# Patient Record
Sex: MALE | Race: BLACK OR AFRICAN AMERICAN | NOT HISPANIC OR LATINO | Employment: OTHER | URBAN - METROPOLITAN AREA
[De-identification: names, ages, dates, MRNs, and addresses within clinical notes are randomized per-mention and may not be internally consistent; named-entity substitution may affect disease eponyms.]

---

## 2017-02-13 ENCOUNTER — TRANSCRIBE ORDERS (OUTPATIENT)
Dept: LAB | Facility: CLINIC | Age: 67
End: 2017-02-13

## 2017-02-13 ENCOUNTER — APPOINTMENT (OUTPATIENT)
Dept: LAB | Facility: CLINIC | Age: 67
End: 2017-02-13
Payer: COMMERCIAL

## 2017-02-13 ENCOUNTER — ALLSCRIPTS OFFICE VISIT (OUTPATIENT)
Dept: OTHER | Facility: OTHER | Age: 67
End: 2017-02-13

## 2017-02-13 DIAGNOSIS — R73.09 OTHER ABNORMAL GLUCOSE: ICD-10-CM

## 2017-02-13 DIAGNOSIS — R73.03 PREDIABETES: ICD-10-CM

## 2017-02-13 DIAGNOSIS — E78.00 PURE HYPERCHOLESTEROLEMIA: ICD-10-CM

## 2017-02-13 LAB
ALBUMIN SERPL BCP-MCNC: 3.9 G/DL (ref 3.5–5)
ALP SERPL-CCNC: 56 U/L (ref 46–116)
ALT SERPL W P-5'-P-CCNC: 32 U/L (ref 12–78)
ANION GAP SERPL CALCULATED.3IONS-SCNC: 9 MMOL/L (ref 4–13)
AST SERPL W P-5'-P-CCNC: 18 U/L (ref 5–45)
BILIRUB SERPL-MCNC: 0.48 MG/DL (ref 0.2–1)
BUN SERPL-MCNC: 16 MG/DL (ref 5–25)
CALCIUM SERPL-MCNC: 9.5 MG/DL (ref 8.3–10.1)
CHLORIDE SERPL-SCNC: 110 MMOL/L (ref 100–108)
CHOLEST SERPL-MCNC: 178 MG/DL (ref 50–200)
CO2 SERPL-SCNC: 23 MMOL/L (ref 21–32)
CREAT SERPL-MCNC: 1.11 MG/DL (ref 0.6–1.3)
EST. AVERAGE GLUCOSE BLD GHB EST-MCNC: 123 MG/DL
GFR SERPL CREATININE-BSD FRML MDRD: >60 ML/MIN/1.73SQ M
GLUCOSE SERPL-MCNC: 83 MG/DL (ref 65–140)
HBA1C MFR BLD: 5.9 % (ref 4.2–6.3)
HDLC SERPL-MCNC: 63 MG/DL (ref 40–60)
LDLC SERPL CALC-MCNC: 100 MG/DL (ref 0–100)
POTASSIUM SERPL-SCNC: 4 MMOL/L (ref 3.5–5.3)
PROT SERPL-MCNC: 8 G/DL (ref 6.4–8.2)
SODIUM SERPL-SCNC: 142 MMOL/L (ref 136–145)
TRIGL SERPL-MCNC: 74 MG/DL

## 2017-02-13 PROCEDURE — 36415 COLL VENOUS BLD VENIPUNCTURE: CPT

## 2017-02-13 PROCEDURE — 83036 HEMOGLOBIN GLYCOSYLATED A1C: CPT

## 2017-02-13 PROCEDURE — 80053 COMPREHEN METABOLIC PANEL: CPT

## 2017-02-13 PROCEDURE — 80061 LIPID PANEL: CPT

## 2018-01-13 VITALS
WEIGHT: 226.25 LBS | SYSTOLIC BLOOD PRESSURE: 132 MMHG | OXYGEN SATURATION: 95 % | BODY MASS INDEX: 30.65 KG/M2 | DIASTOLIC BLOOD PRESSURE: 84 MMHG | HEIGHT: 72 IN | HEART RATE: 94 BPM

## 2018-04-26 ENCOUNTER — OFFICE VISIT (OUTPATIENT)
Dept: INTERNAL MEDICINE CLINIC | Facility: CLINIC | Age: 68
End: 2018-04-26
Payer: COMMERCIAL

## 2018-04-26 ENCOUNTER — APPOINTMENT (OUTPATIENT)
Dept: LAB | Facility: CLINIC | Age: 68
End: 2018-04-26
Payer: COMMERCIAL

## 2018-04-26 ENCOUNTER — TELEPHONE (OUTPATIENT)
Dept: INTERNAL MEDICINE CLINIC | Facility: CLINIC | Age: 68
End: 2018-04-26

## 2018-04-26 VITALS
OXYGEN SATURATION: 93 % | SYSTOLIC BLOOD PRESSURE: 124 MMHG | HEIGHT: 72 IN | BODY MASS INDEX: 30.96 KG/M2 | DIASTOLIC BLOOD PRESSURE: 82 MMHG | HEART RATE: 73 BPM | WEIGHT: 228.6 LBS

## 2018-04-26 DIAGNOSIS — E66.9 OBESITY (BMI 30-39.9): Chronic | ICD-10-CM

## 2018-04-26 DIAGNOSIS — Z23 NEED FOR PNEUMOCOCCAL VACCINATION: ICD-10-CM

## 2018-04-26 DIAGNOSIS — E78.2 MIXED HYPERLIPIDEMIA: Chronic | ICD-10-CM

## 2018-04-26 DIAGNOSIS — Z71.3 DIETARY COUNSELING AND SURVEILLANCE: ICD-10-CM

## 2018-04-26 DIAGNOSIS — Z13.6 SCREENING FOR AAA (ABDOMINAL AORTIC ANEURYSM): ICD-10-CM

## 2018-04-26 DIAGNOSIS — Z13.31 DEPRESSION SCREENING NEGATIVE: ICD-10-CM

## 2018-04-26 DIAGNOSIS — R73.03 PREDIABETES: Primary | Chronic | ICD-10-CM

## 2018-04-26 DIAGNOSIS — R73.03 PREDIABETES: Chronic | ICD-10-CM

## 2018-04-26 DIAGNOSIS — Z11.59 NEED FOR HEPATITIS C SCREENING TEST: ICD-10-CM

## 2018-04-26 PROBLEM — K57.30 COLON, DIVERTICULOSIS: Status: ACTIVE | Noted: 2017-02-13

## 2018-04-26 PROBLEM — K57.30 COLON, DIVERTICULOSIS: Status: RESOLVED | Noted: 2017-02-13 | Resolved: 2018-04-26

## 2018-04-26 LAB
ANION GAP SERPL CALCULATED.3IONS-SCNC: 8 MMOL/L (ref 4–13)
BUN SERPL-MCNC: 17 MG/DL (ref 5–25)
CALCIUM SERPL-MCNC: 9 MG/DL (ref 8.3–10.1)
CHLORIDE SERPL-SCNC: 107 MMOL/L (ref 100–108)
CHOLEST SERPL-MCNC: 180 MG/DL (ref 50–200)
CO2 SERPL-SCNC: 24 MMOL/L (ref 21–32)
CREAT SERPL-MCNC: 1.28 MG/DL (ref 0.6–1.3)
EST. AVERAGE GLUCOSE BLD GHB EST-MCNC: 137 MG/DL
GFR SERPL CREATININE-BSD FRML MDRD: 67 ML/MIN/1.73SQ M
GLUCOSE P FAST SERPL-MCNC: 103 MG/DL (ref 65–99)
HBA1C MFR BLD: 6.4 % (ref 4.2–6.3)
HCV AB SER QL: NORMAL
HDLC SERPL-MCNC: 64 MG/DL (ref 40–60)
LDLC SERPL CALC-MCNC: 98 MG/DL (ref 0–100)
NONHDLC SERPL-MCNC: 116 MG/DL
POTASSIUM SERPL-SCNC: 4.1 MMOL/L (ref 3.5–5.3)
SODIUM SERPL-SCNC: 139 MMOL/L (ref 136–145)
TRIGL SERPL-MCNC: 92 MG/DL

## 2018-04-26 PROCEDURE — 1101F PT FALLS ASSESS-DOCD LE1/YR: CPT | Performed by: INTERNAL MEDICINE

## 2018-04-26 PROCEDURE — 36415 COLL VENOUS BLD VENIPUNCTURE: CPT

## 2018-04-26 PROCEDURE — 90471 IMMUNIZATION ADMIN: CPT | Performed by: INTERNAL MEDICINE

## 2018-04-26 PROCEDURE — 86803 HEPATITIS C AB TEST: CPT

## 2018-04-26 PROCEDURE — 90732 PPSV23 VACC 2 YRS+ SUBQ/IM: CPT | Performed by: INTERNAL MEDICINE

## 2018-04-26 PROCEDURE — 3008F BODY MASS INDEX DOCD: CPT | Performed by: INTERNAL MEDICINE

## 2018-04-26 PROCEDURE — 99214 OFFICE O/P EST MOD 30 MIN: CPT | Performed by: INTERNAL MEDICINE

## 2018-04-26 PROCEDURE — 80061 LIPID PANEL: CPT

## 2018-04-26 PROCEDURE — 83036 HEMOGLOBIN GLYCOSYLATED A1C: CPT

## 2018-04-26 PROCEDURE — 80048 BASIC METABOLIC PNL TOTAL CA: CPT

## 2018-04-26 NOTE — PROGRESS NOTES
INTERNAL MEDICINE FOLLOW-UP OFFICE VISIT  [facility] - [facility] [address] [place] [address]    NAME: Jonnathan Allen  AGE: 79 y o  SEX: male  : 1950     DATE: 2018     Assessment and Plan:     1  Prediabetes    Patient's last A1C was 5 9%  Repeat A1C was ordered  BMI remains above goal at 31 44  Discussed increasing physical activity  Discussed dietary modifications he can make  - HEMOGLOBIN A1C W/ EAG ESTIMATION; Future    2  Obesity (BMI 30-39  9)    Patient's Body mass index is 31 44 kg/m²  Discussed the patient's BMI  The BMI is above average; BMI counseling and education was provided to the patient  General weight loss/lifestyle modification strategies discussed (elicit support from others; identify saboteurs; non-food rewards, etc)  Regular aerobic exercise program was recommended at least 3 times per week    - Basic metabolic panel; Future    3  Mixed hyperlipidemia    Patient has a history of mixed hyperlipidemia  Last lipid panel was actually pretty well controlled  Will repeat  No need for statin at this time  - Lipid panel; Future    4  Need for pneumococcal vaccination    PPSV23 given in office today  Patient previously received PCV13 in 2016     - PNEUMOCOCCAL POLYSACCHARIDE VACCINE 23-VALENT =>3YO SQ IM    5  Screening for AAA (abdominal aortic aneurysm)    Patient smoked for 6 years at around 6 cigs/day  Will ordered screening AAA US      - US abdominal aorta screening aaa; Future    6  Need for hepatitis C screening test    Hep C antibody ordered for screening    - Hepatitis C antibody; Future    Return in about 6 months (around 10/26/2018)       Counseling:     · Medication Side Effects - Adverse side effects of medications were reviewed with the patient/guardian today: Yes  · Counseling was given regarding: Diagnostic results, Prognosis, Risks and benefits of tx options, Intructions for management, Patient and family education, Importance of tx compliance, Risk factor reductions and Impressions  · Barriers to treatment include: No identified barriers      Chief Complaint:     Chief Complaint   Patient presents with    Follow-up      History of Present Illness:     Patient presents for routine follow-up  He has a history of pre-diabetes, mixed hyperlipidemia, and obesity  Patient admits to poor physical activity and poor eating habits at times, though he does feel he tries to watch what he eats  Denies any cardiac symptoms  Denies abd pain, nausea, vomiting, blood in stool  Last colonoscopy 10/2014  He smoked for 6 years at around 6 cigs/day  He has had prevnar 13 in 2016  Does not believe he has had PPSV23 since turning 65  The following portions of the patient's history were reviewed and updated as appropriate: allergies, current medications, past family history, past medical history, past social history, past surgical history and problem list      Review of Systems:     Review of Systems   Constitutional: Negative for activity change, appetite change and fatigue  Respiratory: Negative for apnea, cough, chest tightness, shortness of breath and wheezing  Cardiovascular: Negative for chest pain, palpitations and leg swelling  Gastrointestinal: Negative for abdominal distention, abdominal pain, blood in stool, constipation, diarrhea, nausea and vomiting  Musculoskeletal: Positive for arthralgias  Negative for back pain, gait problem, joint swelling and myalgias  Skin: Negative for rash and wound  Neurological: Negative for dizziness, tremors, seizures, syncope, facial asymmetry, speech difficulty, weakness, light-headedness, numbness and headaches  Psychiatric/Behavioral: Negative for behavioral problems, confusion, hallucinations, sleep disturbance and suicidal ideas  The patient is not nervous/anxious  Problem List:     Patient Active Problem List   Diagnosis    Mixed hyperlipidemia    Obesity (BMI 30-39  9)    Prediabetes      Objective: /82 (BP Location: Left arm)   Pulse 73   Ht 5' 11 5" (1 816 m)   Wt 104 kg (228 lb 9 6 oz)   SpO2 93%   BMI 31 44 kg/m²     Physical Exam   Constitutional: He is oriented to person, place, and time  He appears well-developed and well-nourished  No distress  Eyes: Conjunctivae are normal  Right eye exhibits no discharge  Left eye exhibits no discharge  No scleral icterus  Neck: Neck supple  No JVD present  No thyromegaly present  Cardiovascular: Normal rate, regular rhythm, normal heart sounds and intact distal pulses  Exam reveals no gallop and no friction rub  No murmur heard  Pulmonary/Chest: Effort normal and breath sounds normal  No respiratory distress  He has no wheezes  He has no rales  He exhibits no tenderness  Abdominal: Soft  Bowel sounds are normal  He exhibits no distension and no mass  There is no tenderness  There is no rebound and no guarding  Musculoskeletal: Normal range of motion  He exhibits no edema  Right shoulder: He exhibits crepitus  He exhibits normal range of motion and no bony tenderness  Lymphadenopathy:     He has no cervical adenopathy  Neurological: He is alert and oriented to person, place, and time  Skin: Skin is warm and dry  He is not diaphoretic  Psychiatric: He has a normal mood and affect   His behavior is normal      Pertinent Laboratory/Diagnostic Studies:    Laboratory Results: I have personally reviewed the pertinent laboratory results/reports     Results for orders placed or performed in visit on 02/13/17   Hemoglobin A1c   Result Value Ref Range    Hemoglobin A1C 5 9 4 2 - 6 3 %     mg/dl   Lipid Panel with Direct LDL reflex   Result Value Ref Range    Cholesterol 178 50 - 200 mg/dL    Triglycerides 74 <=150 mg/dL    HDL, Direct 63 (H) 40 - 60 mg/dL    LDL Calculated 100 0 - 100 mg/dL   Comprehensive metabolic panel   Result Value Ref Range    Sodium 142 136 - 145 mmol/L    Potassium 4 0 3 5 - 5 3 mmol/L    Chloride 110 (H) 100 - 108 mmol/L    CO2 23 21 - 32 mmol/L    Anion Gap 9 4 - 13 mmol/L    BUN 16 5 - 25 mg/dL    Creatinine 1 11 0 60 - 1 30 mg/dL    Glucose 83 65 - 140 mg/dL    Calcium 9 5 8 3 - 10 1 mg/dL    AST 18 5 - 45 U/L    ALT 32 12 - 78 U/L    Alkaline Phosphatase 56 46 - 116 U/L    Total Protein 8 0 6 4 - 8 2 g/dL    Albumin 3 9 3 5 - 5 0 g/dL    Total Bilirubin 0 48 0 20 - 1 00 mg/dL    eGFR >60 0 ml/min/1 73sq m     PHQ-9  Negative for depression with PHQ2 score of 0       Fall Risk  The patient does not have a history of falls  A risk assessment for falls was completed  Current Medications:     Current Outpatient Prescriptions   Medication Sig Dispense Refill    Acetaminophen (TYLENOL) 325 MG CAPS Take by mouth      aspirin 81 MG tablet Take by mouth      Multiple Vitamin (MULTIVITAMINS PO) Take by mouth       No current facility-administered medications for this visit          German Manzo DO  [facility]

## 2018-04-26 NOTE — TELEPHONE ENCOUNTER
----- Message from Marie Roman DO sent at 4/26/2018 12:33 PM EDT -----  Let patient know his kidney function, cholesterol was normal  No evidence of hepatitis C in his blood  His glucose control has worsened  A1C went from 5 9 to 6 4%  He is on verge of diabetes  Diabetes is when you get to A1C of 6 5%  Recommend he increase his physical activity, decrease intake of carbohydrates/starches/sweets, and lose 10-15 lbs

## 2018-04-26 NOTE — PATIENT INSTRUCTIONS
Obesity   AMBULATORY CARE:   Obesity  is when your body mass index (BMI) is greater than 30  Your healthcare provider will use your height and weight to measure your BMI  The risks of obesity include  many health problems, such as injuries or physical disability  You may need tests to check for the following:  · Diabetes     · High blood pressure or high cholesterol     · Heart disease     · Gallbladder or liver disease     · Cancer of the colon, breast, prostate, liver, or kidney     · Sleep apnea     · Arthritis or gout  Seek care immediately if:   · You have a severe headache, confusion, or difficulty speaking  · You have weakness on one side of your body  · You have chest pain, sweating, or shortness of breath  Contact your healthcare provider if:   · You have symptoms of gallbladder or liver disease, such as pain in your upper abdomen  · You have knee or hip pain and discomfort while walking  · You have symptoms of diabetes, such as intense hunger and thirst, and frequent urination  · You have symptoms of sleep apnea, such as snoring or daytime sleepiness  · You have questions or concerns about your condition or care  Treatment for obesity  focuses on helping you lose weight to improve your health  Even a small decrease in BMI can reduce the risk for many health problems  Your healthcare provider will help you set a weight-loss goal   · Lifestyle changes  are the first step in treating obesity  These include making healthy food choices and getting regular physical activity  Your healthcare provider may suggest a weight-loss program that involves coaching, education, and therapy  · Medicine  may help you lose weight when it is used with a healthy diet and physical activity  · Surgery  can help you lose weight if you are very obese and have other health problems  There are several types of weight-loss surgery  Ask your healthcare provider for more information    Be successful losing weight:   · Set small, realistic goals  An example of a small goal is to walk for 20 minutes 5 days a week  Anther goal is to lose 5% of your body weight  · Tell friends, family members, and coworkers about your goals  and ask for their support  Ask a friend to lose weight with you, or join a weight-loss support group  · Identify foods or triggers that may cause you to overeat , and find ways to avoid them  Remove tempting high-calorie foods from your home and workplace  Place a bowl of fresh fruit on your kitchen counter  If stress causes you to eat, then find other ways to cope with stress  · Keep a diary to track what you eat and drink  Also write down how many minutes of physical activity you do each day  Weigh yourself once a week and record it in your diary  Eating changes: You will need to eat 500 to 1,000 fewer calories each day than you currently eat to lose 1 to 2 pounds a week  The following changes will help you cut calories:  · Eat smaller portions  Use small plates, no larger than 9 inches in diameter  Fill your plate half full of fruits and vegetables  Measure your food using measuring cups until you know what a serving size looks like  · Eat 3 meals and 1 or 2 snacks each day  Plan your meals in advance  [place] and eat at home most of the time  Eat slowly  · Eat fruits and vegetables at every meal   They are low in calories and high in fiber, which makes you feel full  Do not add butter, margarine, or cream sauce to vegetables  Use herbs to season steamed vegetables  · Eat less fat and fewer fried foods  Eat more baked or grilled chicken and fish  These protein sources are lower in calories and fat than red meat  Limit fast food  Dress your salads with olive oil and vinegar instead of bottled dressing  · Limit the amount of sugar you eat  Do not drink sugary beverages  Limit alcohol  Activity changes:  Physical activity is good for your body in many ways   It helps you burn calories and build strong muscles  It decreases stress and depression, and improves your mood  It can also help you sleep better  Talk to your healthcare provider before you begin an exercise program   · Exercise for at least 30 minutes 5 days a week  Start slowly  Set aside time each day for physical activity that you enjoy and that is convenient for you  It is best to do both weight training and an activity that increases your heart rate, such as walking, bicycling, or swimming  · Find ways to be more active  Do yard work and housecleaning  Walk up the stairs instead of using elevators  Spend your leisure time going to events that require walking, such as outdoor festivals or fairs  This extra physical activity can help you lose weight and keep it off  Follow up with your healthcare provider as directed: You may need to meet with a dietitian  Write down your questions so you remember to ask them during your visits  © 2017 [employer] Information is for End User's use only and may not be sold, redistributed or otherwise used for commercial purposes  All illustrations and images included in CareNotes® are the copyrighted property of A [employer]  or [employer]  The above information is an  only  It is not intended as medical advice for individual conditions or treatments  Talk to your doctor, nurse or pharmacist before following any medical regimen to see if it is safe and effective for you

## 2018-04-26 NOTE — TELEPHONE ENCOUNTER
----- Message from Haim Oliveira DO sent at 4/26/2018 12:33 PM EDT -----  Let patient know his kidney function, cholesterol was normal  No evidence of hepatitis C in his blood  His glucose control has worsened  A1C went from 5 9 to 6 4%  He is on verge of diabetes  Diabetes is when you get to A1C of 6 5%  Recommend he increase his physical activity, decrease intake of carbohydrates/starches/sweets, and lose 10-15 lbs

## 2018-04-27 ENCOUNTER — TELEPHONE (OUTPATIENT)
Dept: INTERNAL MEDICINE CLINIC | Facility: CLINIC | Age: 68
End: 2018-04-27

## 2018-04-27 NOTE — TELEPHONE ENCOUNTER
----- Message from Ya Arizmendi DO sent at 4/26/2018 12:33 PM EDT -----  Let patient know his kidney function, cholesterol was normal  No evidence of hepatitis C in his blood  His glucose control has worsened  A1C went from 5 9 to 6 4%  He is on verge of diabetes  Diabetes is when you get to A1C of 6 5%  Recommend he increase his physical activity, decrease intake of carbohydrates/starches/sweets, and lose 10-15 lbs

## 2018-05-16 ENCOUNTER — TELEPHONE (OUTPATIENT)
Dept: INTERNAL MEDICINE CLINIC | Facility: CLINIC | Age: 68
End: 2018-05-16

## 2018-05-16 ENCOUNTER — HOSPITAL ENCOUNTER (OUTPATIENT)
Dept: ULTRASOUND IMAGING | Facility: CLINIC | Age: 68
Discharge: HOME | End: 2018-05-16
Payer: COMMERCIAL

## 2018-05-16 DIAGNOSIS — Z13.6 SCREENING FOR AAA (ABDOMINAL AORTIC ANEURYSM): ICD-10-CM

## 2018-05-16 PROCEDURE — 76706 US ABDL AORTA SCREEN AAA: CPT

## 2018-05-16 NOTE — TELEPHONE ENCOUNTER
----- Message from Lary Opitz, DO sent at 5/16/2018  1:48 PM EDT -----  Let patient know there was no evidence of abdominal aortic aneurysm on ultrasound

## 2018-05-16 NOTE — TELEPHONE ENCOUNTER
----- Message from Tanja Gresham sent at 5/16/2018  2:03 PM EDT -----  tried home phone no answer, constant ringing  will CB later  ----- Message -----  From: Alison Beard DO  Sent: 5/16/2018   1:48 PM  To: [facility]    Let patient know there was no evidence of abdominal aortic aneurysm on ultrasound

## 2018-07-24 ENCOUNTER — TELEPHONE (OUTPATIENT)
Dept: INTERNAL MEDICINE CLINIC | Facility: CLINIC | Age: 68
End: 2018-07-24

## 2018-07-24 NOTE — TELEPHONE ENCOUNTER
----- Message from Oscar Messer DO sent at 7/24/2018 10:17 AM EDT -----  Regarding: FW: Colonoscopy [facility] Internal Med  Contact: [phone]  Can we get patient to sign a medical release to request his previous colonoscopy  He can either come to our office and sign a release or we can mail him the form to sign and he can mail it back to us  Dr Tawni Mohs  ----- Message -----  From: Ellie Echeverria  Sent: 7/24/2018  10:01 AM  To: Oscar Messer DO  Subject: RE: Colonoscopy [facility] Internal Med                The speciality practice is requesting a signed medical release from the patient before sending it over  Can you please obtain that and scan it into media, and let me know when it's done so I can fax it over to [address]  Thank you     ----- Message -----  From: Oscar Messer DO  Sent: 7/3/2018   9:13 AM  To: [place]  Subject: Colonoscopy [facility] Internal Med                    - In Care Everywhere -    Colonoscopy order seen in [facility] from 10/30/2014 under other results  Seen from [address]  Needs  colonoscopy placed and linked to this report if we are able to track it down since there is no dictated report in Care Everywhere

## 2018-07-24 NOTE — LETTER
July 27, 2018     America Serna   [address]      Dear Mr Brook Rouse: Your health is important to us  We have attempted to obtain your previous colonoscopy from a previous doctor that you saw through [address] [place] [address]  Their office requires a medical release form  We have enclosed a release form for you to sign  You can either mail us back the signed form or drop it off at the office so we may obtain your previous colonoscopy report  If you have any questions, please do not hesitate to call our office      Sincerely,         Deion Mancilla, DO

## 2018-07-25 ENCOUNTER — OTHER (OUTPATIENT)
Dept: URBAN - METROPOLITAN AREA CLINIC 19 | Facility: CLINIC | Age: 68
Setting detail: DERMATOLOGY
End: 2018-07-25

## 2018-07-25 DIAGNOSIS — D49.2 NEOPLASM OF UNSPECIFIED BEHAVIOR OF BONE, SOFT TISSUE, AND SKIN: ICD-10-CM

## 2018-07-25 PROBLEM — L57.0 ACTINIC KERATOSIS: Status: RESOLVED | Noted: 2018-07-25

## 2018-07-25 PROBLEM — D18.01 HEMANGIOMA OF SKIN AND SUBCUTANEOUS TISSUE: Status: RESOLVED | Noted: 2018-07-25

## 2018-07-25 PROCEDURE — 99213 OFFICE O/P EST LOW 20 MIN: CPT

## 2018-07-25 PROCEDURE — 17003 DESTRUCT PREMALG LES 2-14: CPT

## 2018-07-25 PROCEDURE — 17000 DESTRUCT PREMALG LESION: CPT

## 2018-07-27 ENCOUNTER — TELEPHONE (OUTPATIENT)
Dept: INTERNAL MEDICINE CLINIC | Facility: CLINIC | Age: 68
End: 2018-07-27

## 2018-10-30 ENCOUNTER — APPOINTMENT (OUTPATIENT)
Dept: LAB | Facility: CLINIC | Age: 68
End: 2018-10-30
Payer: COMMERCIAL

## 2018-10-30 ENCOUNTER — TELEPHONE (OUTPATIENT)
Dept: INTERNAL MEDICINE CLINIC | Facility: CLINIC | Age: 68
End: 2018-10-30

## 2018-10-30 ENCOUNTER — OFFICE VISIT (OUTPATIENT)
Dept: INTERNAL MEDICINE CLINIC | Facility: CLINIC | Age: 68
End: 2018-10-30
Payer: COMMERCIAL

## 2018-10-30 VITALS
WEIGHT: 226 LBS | HEIGHT: 72 IN | DIASTOLIC BLOOD PRESSURE: 76 MMHG | BODY MASS INDEX: 30.61 KG/M2 | HEART RATE: 67 BPM | SYSTOLIC BLOOD PRESSURE: 132 MMHG | OXYGEN SATURATION: 96 %

## 2018-10-30 DIAGNOSIS — R73.03 PREDIABETES: Chronic | ICD-10-CM

## 2018-10-30 DIAGNOSIS — E78.2 MIXED HYPERLIPIDEMIA: Chronic | ICD-10-CM

## 2018-10-30 DIAGNOSIS — R73.03 PREDIABETES: Primary | Chronic | ICD-10-CM

## 2018-10-30 LAB
ANION GAP SERPL CALCULATED.3IONS-SCNC: 6 MMOL/L (ref 4–13)
BUN SERPL-MCNC: 17 MG/DL (ref 5–25)
CALCIUM SERPL-MCNC: 9 MG/DL (ref 8.3–10.1)
CHLORIDE SERPL-SCNC: 107 MMOL/L (ref 100–108)
CHOLEST SERPL-MCNC: 164 MG/DL (ref 50–200)
CO2 SERPL-SCNC: 24 MMOL/L (ref 21–32)
CREAT SERPL-MCNC: 1.2 MG/DL (ref 0.6–1.3)
EST. AVERAGE GLUCOSE BLD GHB EST-MCNC: 126 MG/DL
GFR SERPL CREATININE-BSD FRML MDRD: 71 ML/MIN/1.73SQ M
GLUCOSE P FAST SERPL-MCNC: 100 MG/DL (ref 65–99)
HBA1C MFR BLD: 6 % (ref 4.2–6.3)
HDLC SERPL-MCNC: 60 MG/DL (ref 40–60)
LDLC SERPL CALC-MCNC: 89 MG/DL (ref 0–100)
NONHDLC SERPL-MCNC: 104 MG/DL
POTASSIUM SERPL-SCNC: 4 MMOL/L (ref 3.5–5.3)
SODIUM SERPL-SCNC: 137 MMOL/L (ref 136–145)
TRIGL SERPL-MCNC: 77 MG/DL

## 2018-10-30 PROCEDURE — 80061 LIPID PANEL: CPT

## 2018-10-30 PROCEDURE — 99214 OFFICE O/P EST MOD 30 MIN: CPT | Performed by: INTERNAL MEDICINE

## 2018-10-30 PROCEDURE — 80048 BASIC METABOLIC PNL TOTAL CA: CPT

## 2018-10-30 PROCEDURE — 83036 HEMOGLOBIN GLYCOSYLATED A1C: CPT

## 2018-10-30 PROCEDURE — 36415 COLL VENOUS BLD VENIPUNCTURE: CPT

## 2018-10-30 PROCEDURE — 3008F BODY MASS INDEX DOCD: CPT | Performed by: INTERNAL MEDICINE

## 2018-10-30 PROCEDURE — 1160F RVW MEDS BY RX/DR IN RCRD: CPT | Performed by: INTERNAL MEDICINE

## 2018-10-30 NOTE — PROGRESS NOTES
INTERNAL MEDICINE FOLLOW-UP OFFICE VISIT  [facility] - [facility]    NAME: Denise Cain  AGE: 76 y o  SEX: male  : 1950     DATE: 10/30/2018     Assessment and Plan:     1  Prediabetes    Patient again educated on need to make lifestyle changes in order to decrease his weight and reduce risk of progression to diabetes  Will repeat A1C  Discussed with him about dietary changes he should make  Increase exercise to 150 minutes/week  Will call with results of testing     - Basic metabolic panel; Future  - Hemoglobin A1C; Future    2  Mixed hyperlipidemia    Recommend moderation in cholesterol intake and increasing physical activity  Will check lipid panel     - Lipid panel; Future    Return in about 6 months (around 2019) for Follow-up  Chief Complaint:     Chief Complaint   Patient presents with    Follow-up     6 month      History of Present Illness:     Patient presents for six-month follow-up  Patient has a history of pre diabetes, hyperlipidemia, and obesity  Patient's weight has not changed significantly since 2018  Patient has not made any significant changes to his diet and has not increased his exercise  This was previously recommended to him after his last set of labs showed an A1c of 6 4%  Patient has no current cardiac complaints  Patient's only complaint is intermittent aches and pains  Patient states that he usually wakes up in the morning with some aches and pains and they get better as he wakes up  The following portions of the patient's history were reviewed and updated as appropriate: allergies, current medications, past family history, past medical history, past social history, past surgical history and problem list      Review of Systems:     Review of Systems   Constitutional: Negative for activity change, appetite change and fatigue     Respiratory: Negative for apnea, cough, chest tightness, shortness of breath and wheezing  Cardiovascular: Negative for chest pain, palpitations and leg swelling  Gastrointestinal: Negative for abdominal distention, abdominal pain, blood in stool, constipation, diarrhea, nausea and vomiting  Musculoskeletal: Positive for arthralgias  Negative for back pain, gait problem, joint swelling and myalgias  Skin: Negative for rash and wound  Neurological: Negative for dizziness, tremors, seizures, syncope, facial asymmetry, speech difficulty, weakness, light-headedness, numbness and headaches  Psychiatric/Behavioral: Negative for behavioral problems, confusion, hallucinations, sleep disturbance and suicidal ideas  The patient is not nervous/anxious  Problem List:     Patient Active Problem List   Diagnosis    Mixed hyperlipidemia    Obesity (BMI 30-39  9)    Prediabetes      Objective:     /76   Pulse 67   Ht 5' 11 5" (1 816 m)   Wt 103 kg (226 lb)   SpO2 96%   BMI 31 08 kg/m²     Physical Exam   Constitutional: He is oriented to person, place, and time  He appears well-developed and well-nourished  No distress  Obesity   Neck: Neck supple  No JVD present  No thyromegaly present  Cardiovascular: Normal rate, regular rhythm and normal heart sounds  No murmur heard  Pulmonary/Chest: Effort normal and breath sounds normal  No respiratory distress  He has no wheezes  He has no rales  He exhibits no tenderness  Abdominal: Soft  Bowel sounds are normal  He exhibits no distension and no mass  There is no tenderness  There is no rebound and no guarding  No hernia  Musculoskeletal: He exhibits no edema  Right shoulder crepitus   Lymphadenopathy:     He has no cervical adenopathy  Neurological: He is alert and oriented to person, place, and time  Skin: Skin is warm and dry  He is not diaphoretic  Psychiatric: He has a normal mood and affect  His behavior is normal    Vitals reviewed        Pertinent Laboratory/Diagnostic Studies:    Laboratory Results: I have personally reviewed the pertinent laboratory results/reports     Results for orders placed or performed in visit on 04/26/18   HEMOGLOBIN A1C W/ EAG ESTIMATION   Result Value Ref Range    Hemoglobin A1C 6 4 (H) 4 2 - 6 3 %     mg/dl   Lipid panel   Result Value Ref Range    Cholesterol 180 50 - 200 mg/dL    Triglycerides 92 <=150 mg/dL    HDL, Direct 64 (H) 40 - 60 mg/dL    LDL Calculated 98 0 - 100 mg/dL    Non-HDL-Chol (CHOL-HDL) 116 mg/dl   Basic metabolic panel   Result Value Ref Range    Sodium 139 136 - 145 mmol/L    Potassium 4 1 3 5 - 5 3 mmol/L    Chloride 107 100 - 108 mmol/L    CO2 24 21 - 32 mmol/L    ANION GAP 8 4 - 13 mmol/L    BUN 17 5 - 25 mg/dL    Creatinine 1 28 0 60 - 1 30 mg/dL    Glucose, Fasting 103 (H) 65 - 99 mg/dL    Calcium 9 0 8 3 - 10 1 mg/dL    eGFR 67 ml/min/1 73sq m   Hepatitis C antibody   Result Value Ref Range    Hepatitis C Ab Non-reactive Non-reactive     Arlin Bains DO  [facility]

## 2018-10-30 NOTE — PATIENT INSTRUCTIONS
Hyperglycemia, Non-Diabetic   WHAT YOU NEED TO KNOW:   What is hyperglycemia? Hyperglycemia is a blood glucose (sugar) level that is higher than normal  Hyperglycemia can be short-term, or it can become a long-term condition that leads to diabetes  What increases my risk for hyperglycemia? · Medical problems, such as a stroke or heart attack, or pancreatitis (inflammation of your pancreas)     · Trauma, such as a burn or injury    · Infections, such as pneumonia or a urinary tract infection     · Medicines, such as steroids and diuretics, or illegal drugs, such as cocaine and ecstasy     · Surgery     · Polycystic ovary syndrome (PCOS) or a history of gestational diabetes     · Family history of diabetes     · Obesity or a sedentary lifestyle  What are the signs and symptoms of hyperglycemia? · More thirst than usual     · Frequent urination     · Weight loss without trying     · Blurred vision     · Nausea and vomiting     · Abdominal pain  How is hyperglycemia diagnosed? · A random blood glucose test  may be done at any time of day to test the amount of sugar in your blood  · A fasting plasma glucose  tests the amount of sugar in your blood after you have fasted for 8 hours  · An oral glucose tolerance test  checks how much your blood sugar level increases over a few hours  After you have fasted for 8 hours, you are given a glucose drink  Your blood sugar level is checked after 1 hour and again 2 hours after you drink the glucose  Healthcare providers look at how much your blood sugar level increases from the first check  · An A1c test  shows the average amount of sugar in your blood over the past 2 to 3 months  How is hyperglycemia treated? Treatment depends on your blood sugar level  You may need any of the following:  · Hypoglycemic medicine  helps to decrease the amount of sugar in your blood  This medicine helps your body move the sugar to your cells, where it is needed for energy   Your healthcare provider will tell you how often to take this medicine and how long to take it  · Insulin  helps to decrease the amount of sugar in your blood  You may need 1 or more shots of insulin each day  You or a family member will be taught how to give the insulin shots  Your healthcare provider will tell you how often you need to inject insulin each day  He will also tell you how long you will need to take it  What are the risks of hyperglycemia? · Treatment may cause your blood sugar level to become too low  The levels of your electrolytes (minerals) may become too high or too low  For example, your potassium level may decrease  · Without treatment, high blood sugar levels can lead to severe dehydration  If you have surgery, you may develop an infection in your surgery wound, or it may not heal well  You may get a blood clot in your leg or arm  The clot may travel to your heart or brain and cause life-threatening problems, such as a heart attack or stroke  Hyperglycemia may cause pancreatitis  Hyperglycemia can also lead to diabetes  Hyperglycemia can damage your nerves, veins, arteries, and organs over time  Damage to arteries may increase your risk for a heart attack or stroke  How can I manage my hyperglycemia? Ask your healthcare provider about these and other ways to help lower your blood sugar level or keep it steady:  · Exercise regularly  This can help to lower your blood sugar levels  It can also improve your heart health and help you stay at a healthy weight  Get at least 30 minutes of exercise 5 days each week  Ask your healthcare provider about the best exercise plan for you  · Lose weight if you are overweight  Even a small loss of 5% to 10% of your body weight can help to decrease your blood sugar levels  Weight loss can also improve your heart health  · Eat healthy foods  Include foods that are high in fiber, such as vegetables, fruits, whole grains, and beans   Also include foods that are low in fat, such as low-fat dairy (milk, yogurt, and cheese), fish, and lean meat  Limit foods that are high in calories and sugar, such as sweet desserts, potato chips, and candy  Limit foods that are high in sodium, such as table salt and salty foods  Your healthcare provider may suggest that you limit carbohydrates to lower your blood sugar levels  · Do not smoke  If you smoke, it is never too late to quit  Smoking can worsen the problems that can occur with hyperglycemia  Ask your healthcare provider for information if you need help quitting  · Limit alcohol  Women should limit alcohol to 1 drink a day  Men should limit alcohol to 2 drinks a day  A drink of alcohol is 12 ounces of beer, 5 ounces of wine, or 1½ ounces of liquor  Do I need to check my blood sugar level? You may need to check your blood sugar level with a blood glucose meter  If you take insulin, you may need to check your blood sugar level at least 3 times each day  Ask your healthcare provider when and how often to check during the day  Ask what your blood sugar levels should be before and after you eat  You may need to check for ketones in your urine if your blood sugar level is high  Write down your results and show them to your healthcare provider  When should I contact my healthcare provider? · You have a fever  · Your blood sugar levels continue to be higher than you were told they should be  · You continue to urinate more often than usual      · You continue to be more thirsty than usual      · You continue to have nausea and vomiting  · You have a wound that has signs of infection, such as redness, swelling, and pus  · You have questions or concerns about your condition or care  When should I seek immediate care or call 911? · Your arm or leg feels warm, tender, and painful  It may look swollen and red  · You feel lightheaded, short of breath, and have chest pain       · You cough up blood   CARE AGREEMENT:   You have the right to help plan your care  Learn about your health condition and how it may be treated  Discuss treatment options with your caregivers to decide what care you want to receive  You always have the right to refuse treatment  The above information is an  only  It is not intended as medical advice for individual conditions or treatments  Talk to your doctor, nurse or pharmacist before following any medical regimen to see if it is safe and effective for you  © 2017 [address] [facility] Information is for End User's use only and may not be sold, redistributed or otherwise used for commercial purposes  All illustrations and images included in CareNotes® are the copyrighted property of [employer]  or [employer]

## 2018-10-30 NOTE — TELEPHONE ENCOUNTER
----- Message from Agustín Ayon DO sent at 10/30/2018 11:02 AM EDT -----  Let patient know his pre-diabetes improved from 6 4 to 6 0% which is good  Glucose was only 100   Kidney function and cholesterol was normal

## 2019-05-20 ENCOUNTER — OFFICE VISIT (OUTPATIENT)
Dept: INTERNAL MEDICINE CLINIC | Facility: CLINIC | Age: 69
End: 2019-05-20
Payer: COMMERCIAL

## 2019-05-20 ENCOUNTER — APPOINTMENT (OUTPATIENT)
Dept: LAB | Facility: CLINIC | Age: 69
End: 2019-05-20
Payer: COMMERCIAL

## 2019-05-20 VITALS
HEART RATE: 85 BPM | DIASTOLIC BLOOD PRESSURE: 88 MMHG | SYSTOLIC BLOOD PRESSURE: 136 MMHG | HEIGHT: 72 IN | OXYGEN SATURATION: 95 % | WEIGHT: 228.6 LBS | BODY MASS INDEX: 30.96 KG/M2

## 2019-05-20 DIAGNOSIS — E66.9 OBESITY (BMI 30-39.9): Chronic | ICD-10-CM

## 2019-05-20 DIAGNOSIS — E78.2 MIXED HYPERLIPIDEMIA: Chronic | ICD-10-CM

## 2019-05-20 DIAGNOSIS — R73.03 PREDIABETES: Chronic | ICD-10-CM

## 2019-05-20 DIAGNOSIS — Z12.5 SCREENING FOR PROSTATE CANCER: ICD-10-CM

## 2019-05-20 DIAGNOSIS — R73.03 PREDIABETES: Primary | Chronic | ICD-10-CM

## 2019-05-20 LAB
ANION GAP SERPL CALCULATED.3IONS-SCNC: 6 MMOL/L (ref 4–13)
BUN SERPL-MCNC: 18 MG/DL (ref 5–25)
CALCIUM SERPL-MCNC: 8.6 MG/DL (ref 8.3–10.1)
CHLORIDE SERPL-SCNC: 107 MMOL/L (ref 100–108)
CHOLEST SERPL-MCNC: 172 MG/DL (ref 50–200)
CO2 SERPL-SCNC: 26 MMOL/L (ref 21–32)
CREAT SERPL-MCNC: 1.27 MG/DL (ref 0.6–1.3)
EST. AVERAGE GLUCOSE BLD GHB EST-MCNC: 131 MG/DL
GFR SERPL CREATININE-BSD FRML MDRD: 67 ML/MIN/1.73SQ M
GLUCOSE P FAST SERPL-MCNC: 92 MG/DL (ref 65–99)
HBA1C MFR BLD: 6.2 % (ref 4.2–6.3)
HDLC SERPL-MCNC: 58 MG/DL (ref 40–60)
LDLC SERPL CALC-MCNC: 99 MG/DL (ref 0–100)
NONHDLC SERPL-MCNC: 114 MG/DL
POTASSIUM SERPL-SCNC: 4.1 MMOL/L (ref 3.5–5.3)
PSA SERPL-MCNC: 4.2 NG/ML (ref 0–4)
SODIUM SERPL-SCNC: 139 MMOL/L (ref 136–145)
TRIGL SERPL-MCNC: 76 MG/DL

## 2019-05-20 PROCEDURE — 3008F BODY MASS INDEX DOCD: CPT | Performed by: INTERNAL MEDICINE

## 2019-05-20 PROCEDURE — 80048 BASIC METABOLIC PNL TOTAL CA: CPT

## 2019-05-20 PROCEDURE — 1160F RVW MEDS BY RX/DR IN RCRD: CPT | Performed by: INTERNAL MEDICINE

## 2019-05-20 PROCEDURE — 99214 OFFICE O/P EST MOD 30 MIN: CPT | Performed by: INTERNAL MEDICINE

## 2019-05-20 PROCEDURE — 1101F PT FALLS ASSESS-DOCD LE1/YR: CPT | Performed by: INTERNAL MEDICINE

## 2019-05-20 PROCEDURE — 83036 HEMOGLOBIN GLYCOSYLATED A1C: CPT

## 2019-05-20 PROCEDURE — 80061 LIPID PANEL: CPT

## 2019-05-20 PROCEDURE — 36415 COLL VENOUS BLD VENIPUNCTURE: CPT

## 2019-05-20 PROCEDURE — G0103 PSA SCREENING: HCPCS

## 2019-05-21 ENCOUNTER — TELEPHONE (OUTPATIENT)
Dept: INTERNAL MEDICINE CLINIC | Facility: CLINIC | Age: 69
End: 2019-05-21

## 2019-05-21 DIAGNOSIS — R97.20 ELEVATED PSA, LESS THAN 10 NG/ML: Primary | ICD-10-CM

## 2019-07-17 PROBLEM — R97.20 ELEVATED PSA: Status: ACTIVE | Noted: 2019-07-17

## 2019-07-17 NOTE — PATIENT INSTRUCTIONS
Prostate Gland Needle Biopsy   WHAT YOU NEED TO KNOW:   A prostate gland needle biopsy is a procedure to remove samples of tissue from your prostate gland  The prostate is a gland in men located just below the bladder and surrounds the urethra (tube that carries urine out of the body)  After the samples are removed, they are sent to a lab and tested for cancer  HOW TO PREPARE:   Before your procedure:   · Ask your caregiver if you need to stop using aspirin or any other prescribed or over-the-counter medicine before your procedure or surgery  · Bring your medicine bottles or a list of your medicines when you see your caregiver  Tell your caregiver if you are allergic to any medicine  Tell your caregiver if you use any herbs, food supplements, or over-the-counter medicine  · You may need to start taking antibiotic medicine 1 day before your procedure  This medicine can help prevent an infection caused by bacteria  You may also need to take antibiotic medicine after your procedure  · Write down the correct date, time, and location of your procedure  The night before your procedure:  Ask caregivers about directions for eating and drinking  The day of your procedure:   · You or a close family member will be asked to sign a legal document called a consent form  It gives caregivers permission to do the procedure or surgery  It also explains the problems that may happen, and your choices  Make sure all your questions are answered before you sign this form  · Caregivers may insert an intravenous tube (IV) into your vein  A vein in the arm is usually chosen  Through the IV tube, you may be given liquids and medicine  · You may be given an enema (liquid medicine put in your rectum) to help empty your bowel  · An anesthesiologist will talk to you before your surgery  You may need medicine to keep you asleep or numb an area of your body during surgery   Tell caregivers if you or anyone in your family has had a problem with anesthesia in the past   WHAT WILL HAPPEN:   What will happen:   · You may be given anesthesia to help keep you comfortable during the procedure  Anesthesia medicines may include numbing gel put into your rectum or shots of numbing medicine given near your prostate  You may have spinal anesthesia to numb the area below your waist  You may also get general anesthesia to keep you asleep and free from pain during the procedure  · A transrectal ultrasound may be used to guide the procedure  A small tube will be put into your rectum to show pictures of your prostate on a monitor  A biopsy needle will be put in through your rectum into your prostate gland  A small sample of tissue will be removed with the needle  Your healthcare provider may take between 6 to 12 samples of tissue from different areas of your prostate gland  A new needle will be used to take each tissue sample  Each sample will be sent to a lab and tested for cancer  After your procedure: You will be able to rest until you are fully awake  Do not  get out of bed until your healthcare provider says it is okay  Once healthcare providers see that you are not having any problems, you may be able to go home  CONTACT YOUR HEALTHCARE PROVIDER IF:   · You are late or cannot make it to your procedure  · You have a fever  SEEK CARE IMMEDIATELY IF:   · You urinate very little or not at all  · You have new or increased pain in your lower abdomen or rectum  RISKS:   · During your procedure, your blood pressure may drop and make you feel dizzy  You may feel pain during or after your procedure  Your bladder, prostate, urethra, and nearby tissues or organs may be damaged during the procedure  After your procedure, you may have bruises and bleeding from your rectum  You may have blood in your urine, bowel movements, or semen  You may get an infection in your urinary tract or prostate gland   The infection may spread to your blood and the rest of your body  · If you have prostate cancer, the biopsy may not show the cancer  The biopsy may show cancer when there is no cancer in your prostate gland  You may need another prostate biopsy  · If you do not have a prostate gland biopsy, you may not learn the cause of your prostate problem  You may not get proper treatment  A prostate gland infection may cause pain and problems when you urinate  An enlarged prostate gland may block your urine flow  If you have prostate cancer, it may spread to other areas of your body and become life-threatening  CARE AGREEMENT:   You have the right to help plan your care  Learn about your health condition and how it may be treated  Discuss treatment options with your caregivers to decide what care you want to receive  You always have the right to refuse treatment  © 2017 [address] [employer] Information is for End User's use only and may not be sold, redistributed or otherwise used for commercial purposes  All illustrations and images included in CareNotes® are the copyrighted property of [employer]  or [employer]  The above information is an  only  It is not intended as medical advice for individual conditions or treatments  Talk to your doctor, nurse or pharmacist before following any medical regimen to see if it is safe and effective for you

## 2019-07-17 NOTE — PROGRESS NOTES
Problem List Items Addressed This Visit        Other    Elevated PSA - Primary     I discussed with the patient the discovery of the PSA molecule and its original use in determining the return of prostate cancer after definitive therapy  I described the normal function of the PSA molecule in the reproductive process and also discussed the detection of PSA in the blood  We discussed the controversial history of PSA screening for prostate cancer in the [place] as well as the risk of over detection and over treatment of prostate cancer by way of PSA screening  The patient understands that PSA blood testing is an imperfect way to screen for prostate cancer and that elevated PSA levels in the blood may also be caused by infection, inflammation, prostatic trauma or manipulation, urological procedures, or by benign prostatic enlargement  The role of the digital rectal examination in prostate cancer screening was also discussed and I discussed with him that there is large interobserver variability in the findings of digital rectal examination  We discussed the continued workup of elevated PSA or abnormal digital rectal examination in the form of the performance of a prostate biopsy  The preparation for, and steps of, an office-based transrectal ultrasound of prostate biopsy were described to the patient  Benefits of obtaining tissue for pathologic analysis were discussed with him and the risks of prostate biopsy were also discussed at length  These risks include but are not limited to infection, bleeding, pain, sepsis with need for admission to hospital, risk of change in sexual function, and risk of diagnosis with prostate cancer  Alternatives to prostate biopsy in the form of continued PSA and ALEKSEY surveillance were also offered to him  All of his questions and concerns were answered and addressed with regard to that detailed above                           Discussion:  Rubén Martinez is very healthy overall, he has an excellent performance status, his PSA has consistently been increasing  His prostate is approximately 35-40 grams and smooth without nodules  He will return for prostate biopsy        Assessment and plan:       Please see problem oriented charting for the assessment plan of today's urological complaints     Yayo Siddiqui MD      Chief Complaint     Chief Complaint   Patient presents with    Elevated PSA         History of Present Illness     Souleymane Benito is a 71 y o  gentleman referred in consultation by Dr Jakub Hou for elevated PSA  A copy of today's consultation has been sent to the referring provider in the name of continuity of care  In terms of previous urologic history he denies this    In terms of history of family history of urologic malignancy or malady he has none  In terms of other complaints today he has none  The following portions of the patient's history were reviewed and updated as appropriate: allergies, current medications, past family history, past medical history, past social history, past surgical history and problem list         Detailed Urologic History     - please refer to HPI    Review of Systems     Review of Systems   Constitutional: Negative  HENT: Negative  Eyes: Negative  Respiratory: Negative  Cardiovascular: Negative  Gastrointestinal: Negative  Endocrine: Negative  Genitourinary:        As per HPI   Musculoskeletal: Negative  Skin: Negative  Allergic/Immunologic: Negative  Neurological: Negative  Hematological: Negative  Psychiatric/Behavioral: Negative  AUA SYMPTOM SCORE      Most Recent Value   AUA SYMPTOM SCORE   How often have you had a sensation of not emptying your bladder completely after you finished urinating? 0   How often have you had to urinate again less than two hours after you finished urinating? 1   How often have you found you stopped and started again several times when you urinate? 0   How often have you found it difficult to postpone urination? 0   How often have you had a weak urinary stream?  0   How often have you had to push or strain to begin urination? 0   How many times did you most typically get up to urinate from the time you went to bed at night until the time you got up in the morning? 3   Quality of Life: If you were to spend the rest of your life with your urinary condition just the way it is now, how would you feel about that?  1   AUA SYMPTOM SCORE  4              Allergies     No Known Allergies    Physical Exam     Physical Exam   Constitutional: He is oriented to person, place, and time  He appears well-developed and well-nourished  No distress  Pleasant  man   HENT:   Head: Normocephalic and atraumatic  Right Ear: External ear normal    Left Ear: External ear normal    Nose: Nose normal    Eyes: Pupils are equal, round, and reactive to light  EOM are normal  Right eye exhibits no discharge  Left eye exhibits no discharge  Neck: Normal range of motion  Neck supple  No tracheal deviation present  No thyromegaly present  Cardiovascular: Intact distal pulses  Pulmonary/Chest: Effort normal  No stridor  No respiratory distress  Abdominal: Soft  He exhibits no distension and no mass  There is no tenderness  There is no rebound and no guarding  No hernia  Genitourinary:   Genitourinary Comments: Uncircumcised, no phimosis, normal meatus, no penile plaques or lesions, no evidence of penile cancer, normal perineum, normal testes, normal spermatic cords bilaterally, patient does have hemorrhoids, normal rectal tone, prostate is 35-40 grams and smooth without nodules   Musculoskeletal: He exhibits no edema, tenderness or deformity  Lymphadenopathy:     He has no cervical adenopathy  Neurological: He is alert and oriented to person, place, and time  No cranial nerve deficit  Coordination normal    Skin: Skin is warm and dry  No rash noted   He is not diaphoretic  No erythema  No pallor  Psychiatric: He has a normal mood and affect  His behavior is normal  Judgment and thought content normal    Nursing note and vitals reviewed  Vital Signs  Vitals:    19 0815   BP: 134/84   BP Location: Right arm   Patient Position: Sitting   Cuff Size: Standard   Pulse: 72   Weight: 102 kg (224 lb 3 2 oz)   Height: 5' 11" (1 803 m)         Current Medications       Current Outpatient Medications:     Acetaminophen (TYLENOL) 325 MG CAPS, Take by mouth as needed , Disp: , Rfl:     aspirin 81 MG tablet, Take 81 mg by mouth daily , Disp: , Rfl:     Multiple Vitamin (MULTIVITAMINS PO), Take by mouth daily , Disp: , Rfl:       Active Problems     Patient Active Problem List   Diagnosis    Mixed hyperlipidemia    Obesity (BMI 30-39  9)    Prediabetes    Elevated PSA         Past Medical History     Past Medical History:   Diagnosis Date    Benign neoplasm of colon     Colon, diverticulosis     ED (erectile dysfunction) of organic origin     Former tobacco use     Mixed hyperlipidemia     Obesity     Prediabetes          Surgical History     Past Surgical History:   Procedure Laterality Date    COLONOSCOPY  10/30/2014    COLONOSCOPY  2004    COLONOSCOPY      VENTRAL HERNIA REPAIR           Family History     Family History   Problem Relation Age of Onset    Heart disease Father         Cardiac pacemaker    Cancer Sister     Stroke Brother          Social History     Social History     Social History     Tobacco Use   Smoking Status Former Smoker    Packs/day: 0 25    Years: 6 00    Pack years: 1 50    Types: Cigarettes    Last attempt to quit:     Years since quittin 5   Smokeless Tobacco Never Used         Pertinent Lab Values     Lab Results   Component Value Date    CREATININE 1 27 2019       Lab Results   Component Value Date    PSA 4 2 (H) 2019    Previous PSA values were 2 1 in 2012, 2 4 in May of 2014, 3 6 in May of 2016, and recently 4 2          Pertinent Imaging      There is no pertinent urological imaging for my review

## 2019-07-18 ENCOUNTER — OFFICE VISIT (OUTPATIENT)
Dept: UROLOGY | Facility: CLINIC | Age: 69
End: 2019-07-18
Payer: COMMERCIAL

## 2019-07-18 VITALS
BODY MASS INDEX: 31.39 KG/M2 | HEART RATE: 72 BPM | SYSTOLIC BLOOD PRESSURE: 134 MMHG | HEIGHT: 71 IN | DIASTOLIC BLOOD PRESSURE: 84 MMHG | WEIGHT: 224.2 LBS

## 2019-07-18 DIAGNOSIS — R97.20 ELEVATED PSA: Primary | ICD-10-CM

## 2019-07-18 PROCEDURE — 99244 OFF/OP CNSLTJ NEW/EST MOD 40: CPT | Performed by: UROLOGY

## 2019-07-18 RX ORDER — CIPROFLOXACIN 500 MG/1
500 TABLET, FILM COATED ORAL EVERY 12 HOURS SCHEDULED
Qty: 2 TABLET | Refills: 0 | Status: SHIPPED | OUTPATIENT
Start: 2019-07-18 | End: 2019-07-19

## 2019-07-18 NOTE — ASSESSMENT & PLAN NOTE
I discussed with the patient the discovery of the PSA molecule and its original use in determining the return of prostate cancer after definitive therapy  I described the normal function of the PSA molecule in the reproductive process and also discussed the detection of PSA in the blood  We discussed the controversial history of PSA screening for prostate cancer in the [place] as well as the risk of over detection and over treatment of prostate cancer by way of PSA screening  The patient understands that PSA blood testing is an imperfect way to screen for prostate cancer and that elevated PSA levels in the blood may also be caused by infection, inflammation, prostatic trauma or manipulation, urological procedures, or by benign prostatic enlargement  The role of the digital rectal examination in prostate cancer screening was also discussed and I discussed with him that there is large interobserver variability in the findings of digital rectal examination  We discussed the continued workup of elevated PSA or abnormal digital rectal examination in the form of the performance of a prostate biopsy  The preparation for, and steps of, an office-based transrectal ultrasound of prostate biopsy were described to the patient  Benefits of obtaining tissue for pathologic analysis were discussed with him and the risks of prostate biopsy were also discussed at length  These risks include but are not limited to infection, bleeding, pain, sepsis with need for admission to hospital, risk of change in sexual function, and risk of diagnosis with prostate cancer  Alternatives to prostate biopsy in the form of continued PSA and ALEKSEY surveillance were also offered to him  All of his questions and concerns were answered and addressed with regard to that detailed above

## 2019-08-22 NOTE — PATIENT INSTRUCTIONS
Prostate Biopsy   WHAT YOU NEED TO KNOW:   A prostate biopsy is a procedure to remove samples of tissue from your prostate gland  The prostate is a male sex gland that makes fluid found in semen  It is located just below the bladder  After the samples are removed, they are sent to a lab and tested for cancer  DISCHARGE INSTRUCTIONS:   Seek care immediately if:   · You have heavy bleeding from your rectum  · You urinate very little or not at all  · You have pain from your procedure that gets worse, even after you take pain medicine  Contact your healthcare provider if:   · You have a fever or chills  · You feel pain or burning when you urinate  · Your urine is cloudy or smells bad  · You have questions or concerns about your condition or care  Medicines:  · Medicines  can help decrease pain  You may need medicine to prevent or treat a bacterial infection  Ask how to take pain medicine safely  · Take your medicine as directed  Contact your healthcare provider if you think your medicine is not helping or if you have side effects  Tell him or her if you are allergic to any medicine  Keep a list of the medicines, vitamins, and herbs you take  Include the amounts, and when and why you take them  Bring the list or the pill bottles to follow-up visits  Carry your medicine list with you in case of an emergency  Follow up with your healthcare provider or urologist as directed: You may need to return for more tests or procedures  Write down your questions so you remember to ask them during your visits  © 2017 [address] [employer] Information is for End User's use only and may not be sold, redistributed or otherwise used for commercial purposes  All illustrations and images included in CareNotes® are the copyrighted property of [employer]  or [employer]  The above information is an  only   It is not intended as medical advice for individual conditions or treatments  Talk to your doctor, nurse or pharmacist before following any medical regimen to see if it is safe and effective for you

## 2019-08-22 NOTE — PROGRESS NOTES
Office TRUS-guided Prostate Biopsy Procedure Note    Indication    Elevated PSA    Informed consent   The risks, benefits and alternatives to TRUS-guided prostate biopsy were conveyed to the patient prior to performing the procedure  A discussion of the risks of the procedure included, but was not limited to: pain, hematuria, hematochezia, hematospermia, infection, and the possibility of a non-diagnostic biopsy  The patient was given the opportunity to have his questions answered and there was no perceived barrier to education  Antibiotic prophylaxis   The patient received the following antibiotics at least 30 minutes prior to undergoing biopsy: Ciprofloxacin 500 mg PO  The patient was instructed to continue taking the antibiotics as prescribed for a total of 1 day, including the day of biopsy  Rectal cleansing  The patient was instructed to perform an evacuating rectal enema 1-2 hours prior to biopsy  Local anesthesia  Topical 2% lidocaine jelly was applied liberally to the anus and rectum and allowed to dwell for at least 5 min prior to starting the procedure  After insertion of the TRUS probe, 10 mL of 2% lidocaine solution was injected with ultrasound guidance at the  junction of the prostate and seminal vesicles  The anesthetic was allowed to dwell for at least 2 minutes prior to biopsy  Transrectal ultrasonography  The patient was placed in the left lateral decubitus position  After an attentive digital rectal examination, a 7 5 mHz sidefire ultrasound probe was gently inserted into the rectum and biplanar imaging of the prostate was done with the findings noted below  Images were taken of any abnormal findings and also to document prostate size      Bladder  The bladder base appeared normal     Prostate  Digital rectal exam findings:  - only mid gland and apex palpable due to a long anal canal and prominent gluteal muscles, no abnormalities noted in this area    Ultrasound size measurements:  -Volume:  51 73 cm3    Ultrasound findings:  -Cysts: None  -Masses: None  -Median lobe: absent    Clinical stage (assuming a positive biopsy):   -T1c     TRUS-guided needle biopsy  Using an 18 gauge biopsy needle and ultrasound guidance, the following biopsies were taken:    1 core(s) from the left lateral base  1 core(s) from the left lateral mid-gland  1 core(s) from the right middle base  1 core(s) from the right lateral base  1 core(s) from the left lateral mid-gland  1 core(s) from the left middle mid-gland  1 core(s) from the right middle mid-gland  1 core(s) from the right lateral mid-gland  1 core(s) from the left lateral apex  1 core(s) from the left middle apex  1 core(s) from the right middle apex  1 core(s) from the right lateral apex    Total number of cores: 12                Complications  There were no procedural complications  Disposition  The patient was dismissed to home  Post-procedure instructions: Today he underwent an uncomplicated transrectal ultrasound-guided biopsy of the prostate, following a periprosthetic nerve block  I reviewed the normal postprocedure a course including bleeding per rectum, hematuria, and hematospermia  I instructed him to complete his course of antibiotics as prescribed  Instructed him to call with fever greater than 101, chills, nausea, vomiting, and poorly controlled pain  His followup was scheduled in approximately 2 weeks' time to review the pathology  Biopsy prostate     Date/Time 8/23/2019 9:10 AM     Performed by  Abimael Way MD     Authorized by Abimael Way MD      Universal Protocol Consent: Verbal consent obtained  Written consent obtained    Risks and benefits: risks, benefits and alternatives were discussed  Consent given by: patient  Patient understanding: patient states understanding of the procedure being performed  Patient consent: the patient's understanding of the procedure matches consent given  Procedure consent: procedure consent matches procedure scheduled  Relevant documents: relevant documents present and verified  Test results: test results available and properly labeled  Site marked: the operative site was not marked  Radiology Images displayed and confirmed  If images not available, report reviewed: imaging studies available  Required items: required blood products, implants, devices, and special equipment available  Patient identity confirmed: verbally with patient and provided demographic data        Local anesthesia used: yes     Anesthesia   Local anesthesia used: yes  Local Anesthetic: lidocaine 2% without epinephrine     Sedation   Patient sedated: no        Specimen: yes    Culture: no   Procedure Details   Procedure Notes: Using an 18 gauge biopsy needle and ultrasound guidance, the following biopsies were taken:    1 core(s) from the left lateral base  1 core(s) from the left lateral mid-gland  1 core(s) from the right middle base  1 core(s) from the right lateral base  1 core(s) from the left lateral mid-gland  1 core(s) from the left middle mid-gland  1 core(s) from the right middle mid-gland  1 core(s) from the right lateral mid-gland  1 core(s) from the left lateral apex  1 core(s) from the left middle apex  1 core(s) from the right middle apex    1 core(s) from the right lateral apex    Total number of cores: 12

## 2019-08-23 ENCOUNTER — PROCEDURE VISIT (OUTPATIENT)
Dept: UROLOGY | Facility: CLINIC | Age: 69
End: 2019-08-23
Payer: COMMERCIAL

## 2019-08-23 ENCOUNTER — TELEPHONE (OUTPATIENT)
Dept: UROLOGY | Facility: CLINIC | Age: 69
End: 2019-08-23

## 2019-08-23 VITALS
DIASTOLIC BLOOD PRESSURE: 88 MMHG | BODY MASS INDEX: 28.1 KG/M2 | HEIGHT: 75 IN | WEIGHT: 226 LBS | SYSTOLIC BLOOD PRESSURE: 140 MMHG | HEART RATE: 106 BPM

## 2019-08-23 DIAGNOSIS — R97.20 ELEVATED PSA: Primary | ICD-10-CM

## 2019-08-23 PROCEDURE — G0416 PROSTATE BIOPSY, ANY MTHD: HCPCS | Performed by: PATHOLOGY

## 2019-08-23 PROCEDURE — 88344 IMHCHEM/IMCYTCHM EA MLT ANTB: CPT | Performed by: PATHOLOGY

## 2019-08-23 PROCEDURE — 55700 PR BIOPSY OF PROSTATE,NEEDLE/PUNCH: CPT | Performed by: UROLOGY

## 2019-08-23 PROCEDURE — 76872 US TRANSRECTAL: CPT | Performed by: UROLOGY

## 2019-08-23 PROCEDURE — 76942 ECHO GUIDE FOR BIOPSY: CPT | Performed by: UROLOGY

## 2019-08-23 NOTE — LETTER
August 23, 2019     Asa Moody DO  [address] [address]    Patient: Vern Jesus   YOB: 1950   Date of Visit: 8/23/2019       Dear Dr Ana Maria Betancourt:    Thank you for referring Vern Jesus to me for evaluation  Below are my notes for this consultation  If you have questions, please do not hesitate to call me  I look forward to following your patient along with you  Sincerely,        Abimael Way MD        CC: No Recipients  Abimael Way MD  8/23/2019  9:11 AM  Sign at close encounter  Office TRUS-guided Prostate Biopsy Procedure Note    Indication    Elevated PSA    Informed consent   The risks, benefits and alternatives to TRUS-guided prostate biopsy were conveyed to the patient prior to performing the procedure  A discussion of the risks of the procedure included, but was not limited to: pain, hematuria, hematochezia, hematospermia, infection, and the possibility of a non-diagnostic biopsy  The patient was given the opportunity to have his questions answered and there was no perceived barrier to education  Antibiotic prophylaxis   The patient received the following antibiotics at least 30 minutes prior to undergoing biopsy: Ciprofloxacin 500 mg PO  The patient was instructed to continue taking the antibiotics as prescribed for a total of 1 day, including the day of biopsy  Rectal cleansing  The patient was instructed to perform an evacuating rectal enema 1-2 hours prior to biopsy  Local anesthesia  Topical 2% lidocaine jelly was applied liberally to the anus and rectum and allowed to dwell for at least 5 min prior to starting the procedure  After insertion of the TRUS probe, 10 mL of 2% lidocaine solution was injected with ultrasound guidance at the  junction of the prostate and seminal vesicles  The anesthetic was allowed to dwell for at least 2 minutes prior to biopsy      Transrectal ultrasonography  The patient was placed in the left lateral decubitus position  After an attentive digital rectal examination, a 7 5 mHz sidefire ultrasound probe was gently inserted into the rectum and biplanar imaging of the prostate was done with the findings noted below  Images were taken of any abnormal findings and also to document prostate size  Bladder  The bladder base appeared normal     Prostate  Digital rectal exam findings:  - only mid gland and apex palpable due to a long anal canal and prominent gluteal muscles, no abnormalities noted in this area    Ultrasound size measurements:  -Volume:  51 73 cm3    Ultrasound findings:  -Cysts: None  -Masses: None  -Median lobe: absent    Clinical stage (assuming a positive biopsy):   -T1c     TRUS-guided needle biopsy  Using an 18 gauge biopsy needle and ultrasound guidance, the following biopsies were taken:    1 core(s) from the left lateral base  1 core(s) from the left lateral mid-gland  1 core(s) from the right middle base  1 core(s) from the right lateral base  1 core(s) from the left lateral mid-gland  1 core(s) from the left middle mid-gland  1 core(s) from the right middle mid-gland  1 core(s) from the right lateral mid-gland  1 core(s) from the left lateral apex  1 core(s) from the left middle apex  1 core(s) from the right middle apex  1 core(s) from the right lateral apex    Total number of cores: 12                Complications  There were no procedural complications  Disposition  The patient was dismissed to home  Post-procedure instructions: Today he underwent an uncomplicated transrectal ultrasound-guided biopsy of the prostate, following a periprosthetic nerve block  I reviewed the normal postprocedure a course including bleeding per rectum, hematuria, and hematospermia  I instructed him to complete his course of antibiotics as prescribed  Instructed him to call with fever greater than 101, chills, nausea, vomiting, and poorly controlled pain   His followup was scheduled in approximately 2 weeks' time to review the pathology  Biopsy prostate     Date/Time 8/23/2019 9:10 AM     Performed by  Gayatri Kenney MD     Authorized by Gayatri Kenney MD      [place] Protocol Consent: Verbal consent obtained  Written consent obtained  Risks and benefits: risks, benefits and alternatives were discussed  Consent given by: patient  Patient understanding: patient states understanding of the procedure being performed  Patient consent: the patient's understanding of the procedure matches consent given  Procedure consent: procedure consent matches procedure scheduled  Relevant documents: relevant documents present and verified  Test results: test results available and properly labeled  Site marked: the operative site was not marked  Radiology Images displayed and confirmed  If images not available, report reviewed: imaging studies available  Required items: required blood products, implants, devices, and special equipment available  Patient identity confirmed: verbally with patient and provided demographic data        Local anesthesia used: yes     Anesthesia   Local anesthesia used: yes  Local Anesthetic: lidocaine 2% without epinephrine     Sedation   Patient sedated: no        Specimen: yes    Culture: no   Procedure Details   Procedure Notes: Using an 18 gauge biopsy needle and ultrasound guidance, the following biopsies were taken:    1 core(s) from the left lateral base  1 core(s) from the left lateral mid-gland  1 core(s) from the right middle base  1 core(s) from the right lateral base  1 core(s) from the left lateral mid-gland  1 core(s) from the left middle mid-gland  1 core(s) from the right middle mid-gland  1 core(s) from the right lateral mid-gland  1 core(s) from the left lateral apex  1 core(s) from the left middle apex  1 core(s) from the right middle apex    1 core(s) from the right lateral apex    Total number of cores: 12

## 2019-09-06 ENCOUNTER — TELEPHONE (OUTPATIENT)
Dept: UROLOGY | Facility: CLINIC | Age: 69
End: 2019-09-06

## 2019-09-06 ENCOUNTER — OFFICE VISIT (OUTPATIENT)
Dept: UROLOGY | Facility: CLINIC | Age: 69
End: 2019-09-06
Payer: COMMERCIAL

## 2019-09-06 VITALS
HEART RATE: 70 BPM | SYSTOLIC BLOOD PRESSURE: 144 MMHG | WEIGHT: 225 LBS | HEIGHT: 75 IN | BODY MASS INDEX: 27.98 KG/M2 | DIASTOLIC BLOOD PRESSURE: 78 MMHG

## 2019-09-06 DIAGNOSIS — R97.20 ELEVATED PSA: ICD-10-CM

## 2019-09-06 DIAGNOSIS — C61 PROSTATE CANCER (HCC): Primary | ICD-10-CM

## 2019-09-06 PROCEDURE — 99215 OFFICE O/P EST HI 40 MIN: CPT | Performed by: UROLOGY

## 2019-09-06 NOTE — TELEPHONE ENCOUNTER
Patient scheduled on 10/11/19 at 1:30 at the [facility] office  Please call and confirm

## 2019-09-06 NOTE — PATIENT INSTRUCTIONS
Prostate Cancer   WHAT YOU NEED TO KNOW:   What do I need to know about prostate cancer? The prostate is the male sex gland that helps make semen  It is about the size of a walnut and wraps around the urethra  The urethra is the tube that carries urine from the bladder to the end of the penis  In most cases, prostate cancer is slow growing  What increases my risk for prostate cancer? · Age older than 48 years    · Father or brother with prostate cancer    · Regularly eating fried or high-fat foods    · Eating few fruits and vegetables    · Exposure to high amounts of certain chemicals, such as in cigarette smoke or alkaline batteries    · A sexually transmitted infection (STI)  What are the signs and symptoms of prostate cancer? You may have no symptoms during the early stages  In the later stages, you may have any of the following:  · Trouble starting or stopping the flow of urine    · Feeling the need to urinate often, especially at night    · Pain or a burning feeling when you urinate or ejaculate semen    · Trouble having an erection    · Blood in your urine or semen    · Not being able to urinate at all    · Pain or stiffness in your lower back, hips, or upper thighs  How is prostate cancer diagnosed? · Digital rectal examination (ALEKSEY)  is a test to check the size and shape of your prostate  Your healthcare provider will insert a gloved finger into your rectum to feel if your prostate is large, firm, or has lumps  · Prostate-specific antigen (PSA)  is a blood test to check PSA levels  These levels may be increased if you have prostate cancer  · A biopsy  is used to take a sample of your prostate gland to be tested for cancer  The sample may also help healthcare providers determine the stage of your cancer  How is prostate cancer treated? If you have early stage cancer, your healthcare provider may recommend that you have frequent tests and regular follow-up visits to watch for changes   You may also need any of the following:  · Hormone therapy  is medicine used to decrease testosterone (male hormone) levels  · Radiation therapy  is used to kill cancer cells with high-energy x-ray beams  You may receive radiation therapy from outside your body or from small beads or rods placed inside your prostate  · Surgery  may be needed, depending on the stage of the cancer  Part or all of your prostate may be removed  You may also need to have some lymph nodes taken out  This may help keep the cancer from spreading to other parts of your body  What can I do to manage my prostate cancer? · Do not smoke  Nicotine can damage blood vessels and make it more difficult to manage your prostate cancer  Smoking also increases your risk for new or returning cancer and delays healing after treatment  Do not use e-cigarettes or smokeless tobacco in place of cigarettes or to help you quit  They still contain nicotine  Ask your healthcare provider for information if you currently smoke and need help quitting  · Limit or do not drink alcohol as directed  Limit alcohol to 2 drinks per day  A drink is 12 ounces of beer, 1½ ounces of liquor, or 5 ounces of wine  · Eat a variety of healthy foods  Healthy foods include fruits, vegetables, whole-grain breads, low-fat dairy products, beans, lean meats, and fish  Your healthcare provider may also recommend changes to the amounts of calcium and vitamin D you have each day  · Manage your weight  Obesity may increase your risk for problems from prostate cancer  Limit or do not have high-calorie foods or drinks  · Exercise as directed  Exercise may help you recover after treatment and may help prevent your prostate cancer from returning  Exercise can also help you manage your weight  Try to get at least 30 minutes of exercise 5 days a week, such as walking  · Ask about sexual activity    Ask your healthcare provider when it is safe for you to start having sex after your treatment  Medicines may be given if you have trouble getting or maintaining an erection  · Manage incontinence  You may have incontinence (trouble controlling when you urinate) after treatment  Ask your healthcare provider for information on managing urinary incontinence  You may be able to gain control over your urination with techniques or medicines  · Drink liquids as directed  Ask how much liquid to drink each day and which liquids are best for you  Drink extra liquids to prevent dehydration  You will also need to replace fluid if you are vomiting or have diarrhea from cancer treatments  Call 911 for any of the following:   · Your leg feels warm, tender, and painful  It may look swollen and red  · You suddenly feel lightheaded and short of breath  · You have chest pain when you take a deep breath or cough  · You cough up blood  When should I contact my healthcare provider? · You have a fever  · You feel you cannot cope with your illness  · You have blood in your urine or have trouble urinating  · You have pain that does not get better after you take your medicine  · You have questions or concerns about your condition or care  CARE AGREEMENT:   You have the right to help plan your care  Learn about your health condition and how it may be treated  Discuss treatment options with your caregivers to decide what care you want to receive  You always have the right to refuse treatment  The above information is an  only  It is not intended as medical advice for individual conditions or treatments  Talk to your doctor, nurse or pharmacist before following any medical regimen to see if it is safe and effective for you  © 2017 [employer] Information is for End User's use only and may not be sold, redistributed or otherwise used for commercial purposes   All illustrations and images included in CareNotes® are the copyrighted property of [employer]  or [employer]

## 2019-09-06 NOTE — TELEPHONE ENCOUNTER
LMOM for Rad Onc to sched PT for [facility], PT has ref and card he will also call them if he does not hear from them by Monday  He is aware he needs follow-up w [facility] and that we will call him w that apt  Please assist Return in about 4 weeks (around 10/4/2019) for Dr TEMPLE, after radiation oncology visit      Thank you

## 2019-09-06 NOTE — PROGRESS NOTES
Problem List Items Addressed This Visit        Genitourinary    Prostate cancer [facility]) - Primary     Patient with a new diagnosis of intermediate risk prostate cancer  Extensive discussion with patient as documented below, specifically with regard to his options for treatment for surgical therapy versus radiation therapy  Of note, the patient is a [employer] and would rather die than receive blood products  He is leaning toward radiation therapy, I have placed the appropriate referral     Plan:  He will see me back in 4-6 weeks, after seeing the radiation oncology team with his final treatment decision         Relevant Orders    Ambulatory referral to Radiation Oncology       Other    Elevated PSA     Seen to be due to intermediate risk prostate cancer               Discussion:    I had a productive consultation today with Tani Damian and with his wife  I was sorry to have to tell them both that his biopsy has returned positive for Faizan 7, intermediate risk prostate cancer  We then had a long discussion regarding his pathology report, it is meaning in plain language, and I also described to them the anatomy of the prostate, prostate cancer risk groups, and the Faizan score, old and new, as well as prognostic grade groups  I then spoke to him about the fact that he does not have a type of cancer that can be safely watched with active surveillance given his race and also given his intermediate risk prostate cancer  We then talked about active therapies for prostate cancer in the form of radical prostatectomy either open or robotic as well as radiation therapy  With regard to robotic surgery we reviewed the pre, cecilia, and postoperative care for robot assisted laparoscopic radical prostatectomy, the operative steps, and the need for a drain and Castillo catheter after surgery    We reviewed the risk of urine leakage and erectile troubles, as well as the risk of infection, bleeding, pain, damage to surrounding structures, need for additional procedures, risk of failure of the procedure, risk of anesthesia, risk of positioning complications including neurapraxia, chronic pain, and paralysis as well as risk of rhabdomyolysis and compartment syndrome  Risk of deep venous thrombosis and venous thromboembolism as well as pneumonia and other potential unpredictable complications were also discussed with the patient  The patient did relate to me that he is a Christianity and would not want blood products under any circumstance  We then spoke about radiation therapy, the theory behind radiation therapy plus or minus hormone therapy, as well as the need for fractionation of radiation therapy treatments  He is interested in radiation at this point in does wish to speak to the radiation oncologist and we will arrange for this  We then talked about the evidence supporting a short course of androgen deprivation therapy in concurrence with radiation therapy and talked about the mechanism of action of these medications and potential risks of loss of energy, muscle mass, and worsening of any metabolic disease or heart disease  He is not sure whether not he would want hormone therapy but he is interested in radiation therapy  Multiple questions and concerns were answered and addressed from him and from his wife, they were grateful for my sade and straightforward counseling today      I spent over 40 minutes in face-to-face consultation with Chelsea Davis and with his wife today          Assessment and plan:       Please see problem oriented charting for the assessment plan of today's urological complaints    Miller De Paz MD      Chief Complaint     Chief Complaint   Patient presents with    Elevated PSA    Intermediate risk prostate cancer      History of Present Illness     Jesus Sidhu is a 71 y o  gentleman with a history of elevated PSA, status post prostate biopsy on the 23rd of August     Prostate biopsy results show Fiazan 3+4=7 prostate cancer at the left lateral base, left lateral mid gland, and Faizan 6 disease at the left medial prostate, left lateral apex, and left medial apex  This represents intermediate risk prostate cancer with a recommendation for treatment by way of surgery or radiation  Long discussion with the patient today regarding his options, he has opted to consider radiation therapy although he is not ruled out any therapies at this time  The following portions of the patient's history were reviewed and updated as appropriate: allergies, current medications, past family history, past medical history, past social history, past surgical history and problem list     Detailed Urologic History     - please refer to HPI    Review of Systems     Review of Systems   Constitutional: Negative  HENT: Negative  Eyes: Negative  Respiratory: Negative  Cardiovascular: Negative  Gastrointestinal: Negative  Endocrine: Negative  Genitourinary:        As per HPI, no post biopsy complications or complaints   Musculoskeletal: Negative  Skin: Negative  Allergic/Immunologic: Negative  Neurological: Negative  Hematological: Negative  Psychiatric/Behavioral: Negative  Allergies     No Known Allergies    Physical Exam     Physical Exam   Constitutional: He is oriented to person, place, and time  He appears well-developed and well-nourished  No distress  HENT:   Head: Normocephalic and atraumatic  Right Ear: External ear normal    Left Ear: External ear normal    Eyes: Conjunctivae are normal  Right eye exhibits no discharge  Left eye exhibits no discharge  No scleral icterus  Neck: No tracheal deviation present  Cardiovascular: Intact distal pulses  Pulmonary/Chest: Effort normal  No stridor  No respiratory distress  Abdominal: Soft  He exhibits no distension  Musculoskeletal: He exhibits no edema, tenderness or deformity     Neurological: He is alert and oriented to person, place, and time  No cranial nerve deficit  Coordination normal    Skin: Skin is warm and dry  No rash noted  He is not diaphoretic  No erythema  No pallor  Psychiatric: He has a normal mood and affect  His behavior is normal  Judgment and thought content normal    Nursing note and vitals reviewed  Vital Signs  Vitals:    09/06/19 1348   BP: 144/78   BP Location: Right arm   Patient Position: Sitting   Cuff Size: Standard   Pulse: 70   Weight: 102 kg (225 lb)   Height: 6' 3" (1 905 m)         Current Medications       Current Outpatient Medications:     Acetaminophen (TYLENOL) 325 MG CAPS, Take by mouth as needed , Disp: , Rfl:     aspirin 81 MG tablet, Take 81 mg by mouth daily , Disp: , Rfl:     Multiple Vitamin (MULTIVITAMINS PO), Take by mouth daily , Disp: , Rfl:     bisacodyl (FLEET) 10 MG/30ML ENEM, Insert 30 mL (10 mg total) into the rectum once for 1 dose Use this enema the morning of your prostate biopsy, Disp: 1 enema, Rfl: 0      Active Problems     Patient Active Problem List   Diagnosis    Mixed hyperlipidemia    Obesity (BMI 30-39  9)    Prediabetes    Elevated PSA    Prostate cancer [facility])         Past Medical History     Past Medical History:   Diagnosis Date    Benign neoplasm of colon     Colon, diverticulosis     ED (erectile dysfunction) of organic origin     Former tobacco use     Mixed hyperlipidemia     Obesity     Prediabetes          Surgical History     Past Surgical History:   Procedure Laterality Date    COLONOSCOPY  10/30/2014    COLONOSCOPY  2004    COLONOSCOPY  2006    VENTRAL HERNIA REPAIR           Family History     Family History   Problem Relation Age of Onset    Heart disease Father         Cardiac pacemaker    Cancer Sister     Stroke Brother          Social History     Social History     Social History     Tobacco Use   Smoking Status Former Smoker    Packs/day: 0 25    Years: 6 00    Pack years: 1 50    Types: Cigarettes    Last attempt to quit: 1978    Years since quittin 7   Smokeless Tobacco Never Used         Pertinent Lab Values     Lab Results   Component Value Date    CREATININE 1 27 2019       Lab Results   Component Value Date    PSA 4 2 (H) 2019             Pertinent Imaging      No imaging for my review

## 2019-09-06 NOTE — ASSESSMENT & PLAN NOTE
Patient with a new diagnosis of intermediate risk prostate cancer  Extensive discussion with patient as documented below, specifically with regard to his options for treatment for surgical therapy versus radiation therapy  Of note, the patient is a [employer] and would rather die than receive blood products      He is leaning toward radiation therapy, I have placed the appropriate referral     Plan:  He will see me back in 4-6 weeks, after seeing the radiation oncology team with his final treatment decision

## 2019-09-09 NOTE — TELEPHONE ENCOUNTER
Attempted to call PT w apt info for 10/11/19 phone says " The person you are calling is not accepting calls " will mail apt letter w apt info

## 2019-09-13 ENCOUNTER — RADIATION ONCOLOGY CONSULT (OUTPATIENT)
Dept: RADIATION ONCOLOGY | Facility: CLINIC | Age: 69
End: 2019-09-13
Attending: RADIOLOGY
Payer: COMMERCIAL

## 2019-09-13 ENCOUNTER — TELEPHONE (OUTPATIENT)
Dept: RADIATION ONCOLOGY | Facility: CLINIC | Age: 69
End: 2019-09-13

## 2019-09-13 ENCOUNTER — TELEPHONE (OUTPATIENT)
Dept: RADIATION ONCOLOGY | Facility: HOSPITAL | Age: 69
End: 2019-09-13

## 2019-09-13 VITALS
RESPIRATION RATE: 18 BRPM | HEART RATE: 77 BPM | WEIGHT: 224 LBS | SYSTOLIC BLOOD PRESSURE: 142 MMHG | DIASTOLIC BLOOD PRESSURE: 80 MMHG | BODY MASS INDEX: 27.85 KG/M2 | HEIGHT: 75 IN

## 2019-09-13 DIAGNOSIS — C61 PROSTATE CANCER (HCC): Primary | ICD-10-CM

## 2019-09-13 DIAGNOSIS — C61 PROSTATE CANCER (HCC): ICD-10-CM

## 2019-09-13 PROCEDURE — 99211 OFF/OP EST MAY X REQ PHY/QHP: CPT | Performed by: RADIOLOGY

## 2019-09-13 PROCEDURE — G0463 HOSPITAL OUTPT CLINIC VISIT: HCPCS | Performed by: RADIOLOGY

## 2019-09-13 NOTE — TELEPHONE ENCOUNTER
Lupron no auth needed office stock ok to use spoke with Mateus Piña at [facility] ref# [id number]  Thanks  Ajay Dominguez

## 2019-09-13 NOTE — TELEPHONE ENCOUNTER
Fritz Martinez was seen by our Radiation Oncologist, Dr Princess Ag and she is recommending [employer]  Patient is schedules to see Dr Casey Rajan on 10/11 for possible hormones  Please coordinate his appts for [employer]

## 2019-09-13 NOTE — PROGRESS NOTES
Kendra Aldrich 1950 is a 71 y o  male    Oncology History    Lab Results  Component Value Date              PSA    1 24      07/15/2004              PSA    1 25      04/07/2006              PSA     2 1       07/18/2012              PSA     2 4       05/05/2014              PSA     3 6       05/03/2016   PSA 4 2 (H) 05/20/2019 7/18/19 Referred to Dr Lana Sweeney by PCP for elevated PSA  Nocturia x 3  prostate is 35-40 grams and smooth without nodules  Plan is biopsy    8/23/19 Prostate biopsies  6 benign cores  1 core with a small focus of atypical prostate glands, suspicious for prostatic adenocarcinoma, involving less than 5% of one core biopsy  [place] 3+3=6 in 3 cores involving 5% or less of the core  [place] 3+4=7 in 2 cores  discontinuously involving 60% of one core biopsy and discontinuously involving 80% of one core biopsy    9/6/19 Dr Lana Sweeney  Discussed surgical therapy versus radiation therapy  he is a [employer] and would not want blood products under any circumstance so is leaning towards radiation as treatment  Also discussed a short course of androgen deprivation therapy in concurrent with radiation therapy   He is unsure about the hormone therapy    10/11/19 Returns to Dr Lana Sweeney        Prostate cancer [facility])    8/23/2019 Biopsy     Final Diagnosis  A  Prostate, right lateral base, core needle biopsy: Benign prostate glands  No malignancy is identified      B  Prostate, right medial base, core needle biopsy: Benign prostate glands  No malignancy is identified      C  Prostate, right lateral mid, core needle biopsy: Focal high-grade PIN  No prostatic adenocarcinoma is identified      D  Prostate, right medial mid, core needle biopsy:Benign prostate glands  No malignancy is identified      E  Prostate, right lateral apex, core needle biopsy: Benign prostate glands  No malignancy is identified      F  Prostate, right medial apex, core needle biopsy: Benign prostate glands   No malignancy is identified      G  Prostate, left lateral base, core needle biopsy:             - Prostatic adenocarcinoma, Faizan score 3 + 4 = 7, Prognostic Grade Group II, discontinuously involving 60% of one core biopsy  - Approximately 10% [place] pattern 4                         H  Prostate, left medial, core needle biopsy:             - Prostatic adenocarcinoma, Faizan score 3 + 3 = 6, Prognostic Grade Group I, involving less than  5% of one disrupted core biopsy                I  Prostate, left lateral mid, core needle biopsy:             - Small focus of atypical prostate glands, suspicious for prostatic adenocarcinoma, involving less than 5% of one core biopsy      J  Prostate, left medial mid, core needle biopsy:             - Prostatic adenocarcinoma, Faizan score 3 + 4 = 7, Prognostic Grade Group II, discontinuously involving 80% of one core biopsy  - Approximately 20% [place] pattern 4               K  Prostate, left lateral apex, core needle biopsy:             - Prostatic adenocarcinoma, Faizan score 3 + 3 = 6, Prognostic Grade Group I, involving 5% of one core biopsy  L  Prostate, left medial apex, core needle biopsy:             - Prostatic adenocarcinoma, [place] 3 + 3 = 6, Prognostic Grade Group I, involving less than 5% of one core biopsy                     9/6/2019 Initial Diagnosis     Prostate cancer [facility])         Clinical Trial: no    Health Maintenance   Topic Date Due    INFLUENZA VACCINE  07/01/2019    Fall Risk  05/20/2020    Depression Screening PHQ  05/20/2020    BMI: Followup Plan  05/20/2020    BMI: Adult  09/06/2020    CRC Screening: Colonoscopy  10/30/2024    DTaP,Tdap,and Td Vaccines (2 - Td) 05/03/2026    Hepatitis C Screening  Completed    Pneumococcal Vaccine: 65+ Years  Completed    Pneumococcal Vaccine: Pediatrics (0 to 5 Years) and At-Risk Patients (6 to 59 Years)  Aged Out    HEPATITIS B VACCINES  Aged Out       Past Medical History: Diagnosis Date    Benign neoplasm of colon     Colon, diverticulosis     ED (erectile dysfunction) of organic origin     Former tobacco use     Mixed hyperlipidemia     Obesity     Prediabetes        Past Surgical History:   Procedure Laterality Date    COLONOSCOPY  10/30/2014    COLONOSCOPY  2004    COLONOSCOPY  2006    VENTRAL HERNIA REPAIR         Family History   Problem Relation Age of Onset    Heart disease Father         Cardiac pacemaker    Cancer Sister     Stroke Brother        Social History     Tobacco Use    Smoking status: Former Smoker     Packs/day: 0 25     Years: 6 00     Pack years: 1 50     Types: Cigarettes     Last attempt to quit:      Years since quittin 7    Smokeless tobacco: Never Used   Substance Use Topics    Alcohol use: Yes     Frequency: Monthly or less     Drinks per session: 1 or 2     Binge frequency: Never     Comment: occasionaly    Drug use: No          Current Outpatient Medications:     Acetaminophen (TYLENOL) 325 MG CAPS, Take by mouth as needed , Disp: , Rfl:     aspirin 81 MG tablet, Take 81 mg by mouth daily , Disp: , Rfl:     Multiple Vitamin (MULTIVITAMINS PO), Take by mouth daily , Disp: , Rfl:     No Known Allergies     Review of Systems:  Review of Systems   Constitutional: Negative  HENT: Negative  Eyes: Negative  Respiratory: Negative  Cardiovascular: Negative  Gastrointestinal: Negative  Endocrine: Negative  Genitourinary: Negative for dysuria, frequency, hematuria and urgency  Nocturia x3   Musculoskeletal: Negative  Allergic/Immunologic: Negative  Neurological: Negative  Hematological: Negative  Psychiatric/Behavioral: Negative          Vitals:    19 0805   BP: 142/80   BP Location: Right arm   Patient Position: Sitting   Cuff Size: Standard   Pulse: 77   Resp: 18   Weight: 102 kg (224 lb)   Height: 6' 3" (1 905 m)       Pain Score: 0-No pain    Pain assessment: 0    PSA   Date Value Ref Range Status   05/20/2019 4 2 (H) 0 0 - 4 0 ng/mL Final     Comment:       American Urological Association Guidelines define biochemical recurrence of prostate cancer as a detectable or rising PSA value post-radical prostatectomy that is greater than or equal to 0 2 ng/mL with a second confirmatory level of greater than or equal to 0 2 ng/mL    :    Imaging:No images are attached to the encounter       Teaching done and packet given

## 2019-09-13 NOTE — TELEPHONE ENCOUNTER
Fiona Lay may get lupron at visit with Dr Lana Sweeney on 10/11/19  Dr Lana Sweeney will need to order [employer] at that time

## 2019-09-17 ENCOUNTER — DOCUMENTATION (OUTPATIENT)
Dept: UROLOGY | Facility: AMBULATORY SURGERY CENTER | Age: 69
End: 2019-09-17

## 2019-09-17 NOTE — PROGRESS NOTES
Oncology Navigator Note: Multidisciplinary Urology Case Review 9/17/19  Physician Recommended Plan    Freida Rosas is a 71 y o  male    Diagnosis: Prostate Cancer, Faizan 7    Patient was discussed at the Multidisciplinary Urology Case review on 9/17/19  The group recommended that the patient should have definitive treatment for his Prostate Cancer with either prostatectomy or radiation therapy

## 2019-09-23 ENCOUNTER — TELEPHONE (OUTPATIENT)
Dept: OTHER | Facility: HOSPITAL | Age: 69
End: 2019-09-23

## 2019-09-23 NOTE — PROGRESS NOTES
Consultation - Radiation Oncology     TFQ:[id number] : 1950  Encounter: [id number]  Patient Information: [id number]  Chief Complaint   Patient presents with    Prostate Cancer    Consult     Cancer Staging  Prostate cancer [facility])  Staging form: Prostate, AJCC 8th Edition  - Clinical: Stage IIB (cT1c, cN0, cM0, PSA: 4 2, Grade Group: 2) - Signed by Sarah Ponce MD on 2019  Prostate specific antigen (PSA) range: Less than 10  Histologic grading system: 5 grade system  Faizan score: 7         History of Present Illness   Jesus Sidhu is a 71y o  year old male  who was found with elevated PSA of of 4 2ng/mL on routine screening from 2019  PSA history as follows:  Lab Results  Component            Value            Date              PSA    1 24      07/15/2004              PSA    1 25      2006              PSA     2 1       2012              PSA     2 4       2014              PSA     3 6       2016              PSA            4 2 (H)      2019 transrectal ultrasound-guided needle core biopsy of the prostate performed in the care of Dr Eldon Leonardo demonstrated 5/12 cores  Playas 3+3=6 in 3 cores involving 5% or less of the core and Faizan 3+4=7 in 2 cores  discontinuously involving 60% of one core biopsy and discontinuously involving 80% of one core biopsy  There is no lymphovascular invasion or extracapsular extension  1 additional core demonstrated a small focus of atypical prostate glands, suspicious for prostatic adenocarcinoma, involving less than 5% of one core biopsy        19 Dr Eldon Leonardo discussed surgical therapy versus radiation therapy  The patient is a Confucianism and would not want blood products under any circumstance  He presents today to discuss short course of androgen deprivation therapy with concurrent with radiation therapy  He is scheduled to return for follow-up on 10/11/19 with Dr Eldon Leonardo    He presents Writer spoke with patient and informed her that the Dr Marcell Haq has ordered an mammogram, Patient states she will be getting it done at [facility] and asked for an appointment date and time. today for consideration for definitive radiation  Currently, the patient states that he generally feels well  He denies any unplanned weight loss or bone pain  He denies any significant urinary symptoms including hematuria, dysuria, or urinary incontinence  Nocturia 3 times per night  He does experience some intermittency and slow stream less than 50% of the time  He feels that he empties his bladder fully  The patient denies any diarrhea or constipation  He does have a history of bright red blood per rectum prompting a colonoscopy and a polyp was removed in 2014  No further colonoscopies have been performed  He denies any rectal bleeding since that time  The patient states that he is sexually active, but is unable to achieve a full erection or penetrate his partner  He has never used Viagra Viagra like products  [facility] erectile dysfunction score:  1/3  Historical Information      Prostate cancer ([facility] [address] )    8/23/2019 Biopsy     Final Diagnosis  A  Prostate, right lateral base, core needle biopsy: Benign prostate glands  No malignancy is identified      B  Prostate, right medial base, core needle biopsy: Benign prostate glands  No malignancy is identified      C  Prostate, right lateral mid, core needle biopsy: Focal high-grade PIN  No prostatic adenocarcinoma is identified      D  Prostate, right medial mid, core needle biopsy:Benign prostate glands  No malignancy is identified      E  Prostate, right lateral apex, core needle biopsy: Benign prostate glands  No malignancy is identified      F  Prostate, right medial apex, core needle biopsy: Benign prostate glands  No malignancy is identified      G  Prostate, left lateral base, core needle biopsy:             - Prostatic adenocarcinoma, [place] score 3 + 4 = 7, Prognostic Grade Group II, discontinuously involving 60% of one core biopsy               - Approximately 10% [place] pattern 4                         H  Prostate, left medial, core needle biopsy:             - Prostatic adenocarcinoma, Faizan score 3 + 3 = 6, Prognostic Grade Group I, involving less than  5% of one disrupted core biopsy                I  Prostate, left lateral mid, core needle biopsy:             - Small focus of atypical prostate glands, suspicious for prostatic adenocarcinoma, involving less than 5% of one core biopsy      J  Prostate, left medial mid, core needle biopsy:             - Prostatic adenocarcinoma, [place] score 3 + 4 = 7, Prognostic Grade Group II, discontinuously involving 80% of one core biopsy  - Approximately 20% [place] pattern 4               K  Prostate, left lateral apex, core needle biopsy:             - Prostatic adenocarcinoma, Faizan score 3 + 3 = 6, Prognostic Grade Group I, involving 5% of one core biopsy  L  Prostate, left medial apex, core needle biopsy:             - Prostatic adenocarcinoma, Faizan 3 + 3 = 6, Prognostic Grade Group I, involving less than 5% of one core biopsy                     9/6/2019 Initial Diagnosis     Prostate cancer ([facility] [address] )      9/23/2019 -  Cancer Staged     Staging form: Prostate, AJCC 8th Edition  - Clinical: Stage IIB (cT1c, cN0, cM0, PSA: 4 2, Grade Group: 2) - Signed by Rafael Felton MD on 9/23/2019  Prostate specific antigen (PSA) range: Less than 10  Histologic grading system: 5 grade system  Faizan score: 7             Past Medical History:   Diagnosis Date    Benign neoplasm of colon     Colon, diverticulosis     ED (erectile dysfunction) of organic origin     Former tobacco use     Mixed hyperlipidemia     Obesity     Prediabetes      Past Surgical History:   Procedure Laterality Date    COLONOSCOPY  10/30/2014    COLONOSCOPY  2004    COLONOSCOPY  2006    VENTRAL HERNIA REPAIR         Family History   Problem Relation Age of Onset    Heart disease Father         Cardiac pacemaker    Cancer Sister     Stroke Brother        Social History   Social History     Substance and Sexual Activity   Alcohol Use Yes    Frequency: Monthly or less    Drinks per session: 1 or 2    Binge frequency: Never    Comment: occasionaly     Social History     Substance and Sexual Activity   Drug Use No     Social History     Tobacco Use   Smoking Status Former Smoker    Packs/day: 0 25    Years: 6 00    Pack years: 1 50    Types: Cigarettes    Last attempt to quit:     Years since quittin 7   Smokeless Tobacco Never Used         Meds/Allergies     Current Outpatient Medications:     Acetaminophen (TYLENOL) 325 MG CAPS, Take by mouth as needed , Disp: , Rfl:     aspirin 81 MG tablet, Take 81 mg by mouth daily , Disp: , Rfl:     Multiple Vitamin (MULTIVITAMINS PO), Take by mouth daily , Disp: , Rfl:   No Known Allergies      Review of Systems   Constitutional: Negative  HENT: Negative  Eyes: Negative  Respiratory: Negative  Cardiovascular: Negative  Gastrointestinal: Negative  Endocrine: Negative  Genitourinary: Negative for dysuria, frequency, hematuria and urgency  Nocturia x3   Musculoskeletal: Negative  Allergic/Immunologic: Negative  Neurological: Negative  Hematological: Negative  Psychiatric/Behavioral: Negative  OBJECTIVE:   /80 (BP Location: Right arm, Patient Position: Sitting, Cuff Size: Standard)   Pulse 77   Resp 18   Ht 6' 3" (1 905 m)   Wt 102 kg (224 lb)   BMI 28 00 kg/m²   Pain Assessment:  0  Performance Status: Karnofsky: 100 - Fully active, able to carry on all pre-disease performed without restriction    Physical Exam   Constitutional: He is oriented to person, place, and time  He appears well-developed and well-nourished  No distress  HENT:   Mouth/Throat: Oropharynx is clear and moist    Eyes: Conjunctivae are normal  No scleral icterus  Cardiovascular: Normal rate and regular rhythm  No murmur heard  Pulmonary/Chest: Effort normal and breath sounds normal  No respiratory distress  He has no wheezes  He has no rhonchi  He has no rales  Abdominal: Soft  He exhibits no distension  There is no tenderness  Genitourinary:   Genitourinary Comments: Digital rectal examination demonstrates normal external sphincter tone  The prostate is smooth, flat, without palpable nodularity  No blood seen on the examiner's finger  Musculoskeletal: He exhibits no edema  No spinal or CVA tenderness to percussion  Lymphadenopathy:     He has no cervical adenopathy  Right: No inguinal and no supraclavicular adenopathy present  Left: No inguinal and no supraclavicular adenopathy present  Neurological: He is alert and oriented to person, place, and time  Gait normal    Psychiatric: He has a normal mood and affect  His behavior is normal    Nursing note and vitals reviewed        RESULTS  Lab Results    Chemistry        Component Value Date/Time    K 4 1 05/20/2019 0921     05/20/2019 0921    CO2 26 05/20/2019 0921    BUN 18 05/20/2019 0921    CREATININE 1 27 05/20/2019 0921        Component Value Date/Time    CALCIUM 8 6 05/20/2019 0921    ALKPHOS 56 02/13/2017 1319    AST 18 02/13/2017 1319    ALT 32 02/13/2017 1319            Pathology:Collected:  8/23/2019 08:41 Status:  Final result   Visible to patient:  Yes (MyChart) Dx:  Elevated PSA   Component    Case Report   Surgical Pathology Report                         Case: [id number]                                    Authorizing Provider: Elizabeth Helm MD        Collected:           08/23/2019 0841               Ordering Location:     [facility]        Received:            08/23/2019 0841                                      Urology [place]                                                      Pathologist:           Dean Villar MD                                                           Specimens:   A) - Prostate, Right Lateral Base                                                                    B) - Prostate, Right Medial Base                                                                     C) - Prostate, Right Lateral Mid                                                                     D) - Prostate, Right Medial Mid                                                                      E) - Prostate, Right Lateral [place]                                                                    F) - Prostate, Right Medial [place]                                                                     G) - Prostate, Left Lateral Base                                                                     H) - Prostate, Left Medial Base                                                                      I) - Prostate, Left Lateral Mid                                                                      J) - Prostate, Left Medial Mid                                                                       K) - Prostate, Left Lateral [place]                                                                     L) - Prostate, Left Medial [place]                                                            Final Diagnosis   A  Prostate, right lateral base, core needle biopsy:             - Benign prostate glands  - No malignancy is identified      B  Prostate, right medial base, core needle biopsy:             - Benign prostate glands  - No malignancy is identified      C  Prostate, right lateral mid, core needle biopsy:             - Focal high-grade PIN              - No prostatic adenocarcinoma is identified      D  Prostate, right medial mid, core needle biopsy:             - Benign prostate glands  - No malignancy is identified      E  Prostate, right lateral apex, core needle biopsy:             - Benign prostate glands  - No malignancy is identified      F  Prostate, right medial apex, core needle biopsy:             - Benign prostate glands  - No malignancy is identified      G   Prostate, left lateral base, core needle biopsy:             - Prostatic adenocarcinoma, [place] score 3 + 4 = 7, Prognostic Grade Group II, discontinuously involving 60% of one core biopsy  - Approximately 10% Faizan pattern 4                        - No perineural invasion is identified              - No lymphovascular invasion is identified              - No extraprostatic extension is identified      H  Prostate, left medial, core needle biopsy:             - Prostatic adenocarcinoma, [place] score 3 + 3 = 6, Prognostic Grade Group I, involving less than  5% of one disrupted core biopsy              - No perineural invasion is identified              - No lymphovascular invasion is identified              - No extraprostatic extension is identified      I  Prostate, left lateral mid, core needle biopsy:             - Small focus of atypical prostate glands, suspicious for prostatic adenocarcinoma, involving less than 5% of one core biopsy      J  Prostate, left medial mid, core needle biopsy:             - Prostatic adenocarcinoma, [place] score 3 + 4 = 7, Prognostic Grade Group II, discontinuously involving 80% of one core biopsy  - Approximately 20% [place] pattern 4              - No perineural invasion is identified              - No lymphovascular invasion is identified              - No extraprostatic extension is identified      K  Prostate, left lateral apex, core needle biopsy:             - Prostatic adenocarcinoma, [place] score 3 + 3 = 6, Prognostic Grade Group I, involving 5% of one core biopsy              - No perineural invasion is identified              - No lymphovascular invasion is identified              - No extraprostatic extension is identified      L  Prostate, left medial apex, core needle biopsy:             - Prostatic adenocarcinoma, Faizan 3 + 3 = 6, Prognostic Grade Group I, involving less than 5% of one core biopsy              - No perineural invasion is identified  - No lymphovascular invasion is identified              - No extraprostatic extension is identified      Note: Unstained slides from Block J are suggested if molecular studies are indicated (e g , Prolaris)     Interpretation performed at [facility], [address]  1  Prostate cancer [facility])  Ambulatory referral to Radiation Oncology    Radiation Simulation Treatment     Cancer Staging  Prostate cancer [facility])  Staging form: Prostate, AJCC 8th Edition  - Clinical: Stage IIB (cT1c, cN0, cM0, PSA: 4 2, Grade Group: 2) - Signed by Teresa Li MD on 9/23/2019  Prostate specific antigen (PSA) range: Less than 10  Histologic grading system: 5 grade system  [place] score: 7      PLAN/DISCUSSION  Orders Placed This Encounter   Procedures   [address] Treatment        Krystal Baron is a 71y o  year old male recently diagnosed with clinical stage T1c prostatic adenocarcinoma with mixed [place] score 6 and 7 (3+4) involving a 5/12 cores and a pretreatment PSA of 4 2 ng/mL  His clinical presentation, is consistent with unfavorable intermediate risk prostatic adenocarcinoma  I agree with recommendation for definitive external beam radiation therapy to a total dose of 79 2-81 Gy depending on normal tissue tolerance with androgen deprivation therapy for 4-6 months  I feel that this would offer the patient significant benefit in terms of local regional control and potential cure  We discussed possible treatment options for the patient including active surveillance, radioactive seed implantation with or without external beam radiation, surgery, and definitive external beam radiation therapy with or without short course androgen deprivation therapy  The patient is a [employer]ism and is currently not interested in surgery  He did not wish to pursue active surveillance, which I feel is reasonable given his lack of significant comorbidities    I explained that we do not perform prostate brachytherapy at [facility], but would be happy to refer him for an opinion and possible treatment with brachytherapy  After discussing the pros and cons of radioactive seed implantation versus external beam radiation therapy, however, the patient stated that he was not interested in this procedure  The rationale and potential benefits, as well as the risks and acute and late side effects and potential toxicities of radiation were discussed with the patient at length  Side effects discussed included, but were not limited to: Fatigue, irritative urinary side effects, diarrhea, rectal urgency, radiation proctitis, erectile dysfunction, and radiation cystitis  He was given the opportunity to ask questions that were answered to his satisfaction  He wished to pursue the recommended treatment plan  The patient is a candidate for SpaceOar placement  We will have him return to Dr Rosario Bray for initiation of ADT and referral for [address] placement for radiation treatment  We are available for any questions or concerns that may arise  Law Rincon MD  4/72/4776,3:29 AM      Portions of the record may have been created with voice recognition software   Occasional wrong word or "sound a like" substitutions may have occurred due to the inherent limitations of voice recognition software   Read the chart carefully and recognize, using context, where substitutions have occurred

## 2019-09-23 NOTE — TELEPHONE ENCOUNTER
Great, we will order him for fiducial markers as well as spaceoar at his next visit    ----- Message from Devorah Stephens MD sent at 9/23/2019 12:36 PM EDT -----  Alcira Asif,    Mr Cynthia Ureña has decided to pursue ADT + RT  I think he is a candidate for spaceOar too  He should be coming back to you  Just FYI  Thanks!     [facility]

## 2019-10-08 NOTE — TELEPHONE ENCOUNTER
Pt is scheduled for SpaceOar procedure on 11/19 at [place] [address] with Dr Mayelin Chaudhry  Orders have been completed  Surgery coordinator will not contact the pt about the procedure date until after Lupron injection has been given  Please make surgery coordinator aware of any changes to the date of the Lupron injection

## 2019-10-09 NOTE — PROGRESS NOTES
Problem List Items Addressed This Visit        Genitourinary    Prostate cancer ([facility] [address] )    Relevant Medications    degarelix acetate (FIRMAGON) injection 240 mg       Other    Elevated PSA - Primary    Encounter for monitoring androgen deprivation therapy          Discussion:  Khang Mccauley is doing very well, he has decided to pursue radiation therapy, he will get Firmagon loading dosing today, return in 1 month for Lupron, he will follow up for marker placement and SpaceOAR placement as well  All questions and concerns answered and addressed  He will return for the above procedure, he will then procedure radiation therapy, we will then see him on an every 3 to 6 month basis with a PSA prior        Assessment and plan:       Please see problem oriented charting for the assessment plan of today's urological complaints    Juanita Vogel MD      Chief Complaint     Chief Complaint   Patient presents with    Prostate Cancer   LUTS  Monitoring of androgen deprivation therapy       History of Present Illness     Robyn Kilgore is a 71 y o  man with intermediate risk prostate cancer  I had a long talk with him at his last visit, he has consulted with Radiation Oncology and has decided upon radiation therapy  All things considered, I think that this is an excellent treatment option for him  His AUA symptom score today is moderate with a total score of 8 but a bother score of just 2 indicating that he is voiding well  Systemically he feels well and has no new bone pain, no issues with his performance status, and his performance status is currently 0 indicating no detriment  I explained to him the mechanism of action of [place] today, as well as that of Lupron  We talked about potential hot flashes, changes in fat distribution, tenderness in the breasts, and the need to maintain physical activity, and the detriment to his sexual function that he will experience over the next 6 months or so      He will return in 1 month for a Lupron injection, 6 month depot  He has been ordered for spaceoar as well as fiducial marker placement, he signed informed consent today that we co signed by my colleague Dr Lucina Padgett who is currently performing these procedures for our practice    Detailed Urologic History     - please refer to HPI    Review of Systems     Review of Systems   Constitutional: Negative  HENT: Negative  Eyes: Negative  Respiratory: Negative  Cardiovascular: Negative  Gastrointestinal: Negative  Endocrine: Negative  Genitourinary:        As per HPI   Musculoskeletal: Negative  Skin: Negative  Allergic/Immunologic: Negative  Neurological: Negative  Hematological: Negative  Psychiatric/Behavioral: Negative  Allergies     No Known Allergies    Physical Exam     Physical Exam   Constitutional: He is oriented to person, place, and time  He appears well-developed and well-nourished  No distress  HENT:   Head: Normocephalic and atraumatic  Eyes: Pupils are equal, round, and reactive to light  EOM are normal  Right eye exhibits no discharge  Left eye exhibits no discharge  Neck: No tracheal deviation present  Cardiovascular: Intact distal pulses  Pulmonary/Chest: Effort normal  No stridor  No respiratory distress  Abdominal: Soft  He exhibits no distension and no mass  There is no tenderness  There is no rebound and no guarding  No hernia  Musculoskeletal: Normal range of motion  He exhibits no edema, tenderness or deformity  Neurological: He is alert and oriented to person, place, and time  No cranial nerve deficit  Coordination normal    Skin: Skin is warm and dry  No rash noted  He is not diaphoretic  No erythema  No pallor  Psychiatric: He has a normal mood and affect  His behavior is normal  Judgment and thought content normal    Nursing note and vitals reviewed            Vital Signs  Vitals:    10/11/19 1305   BP: 134/80   BP Location: Left arm   Cuff Size: Standard   Pulse: 90   Weight: 101 kg (223 lb)   Height: 6' 3" (1 905 m)         Current Medications       Current Outpatient Medications:     Acetaminophen (TYLENOL) 325 MG CAPS, Take by mouth as needed , Disp: , Rfl:     aspirin 81 MG tablet, Take 81 mg by mouth daily , Disp: , Rfl:     Multiple Vitamin (MULTIVITAMINS PO), Take by mouth daily , Disp: , Rfl:     Current Facility-Administered Medications:     degarelix acetate (FIRMAGON) injection 240 mg, 240 mg, Subcutaneous, Once, Shan Morrell MD      Active Problems     Patient Active Problem List   Diagnosis    Mixed hyperlipidemia    Obesity (BMI 30-39  9)    Prediabetes    Elevated PSA    Prostate cancer ([facility] [address] )    Encounter for monitoring androgen deprivation therapy         Past Medical History     Past Medical History:   Diagnosis Date    Benign neoplasm of colon     Colon, diverticulosis     ED (erectile dysfunction) of organic origin     Former tobacco use     Mixed hyperlipidemia     Obesity     Prediabetes          Surgical History     Past Surgical History:   Procedure Laterality Date    COLONOSCOPY  10/30/2014    COLONOSCOPY  2004    COLONOSCOPY  2006    VENTRAL HERNIA REPAIR           Family History     Family History   Problem Relation Age of Onset    Heart disease Father         Cardiac pacemaker    Cancer Sister     Stroke Brother          Social History     Social History     Social History     Tobacco Use   Smoking Status Former Smoker    Packs/day: 0 25    Years: 6 00    Pack years: 1 50    Types: Cigarettes    Last attempt to quit:     Years since quittin 8   Smokeless Tobacco Never Used         Pertinent Lab Values     Lab Results   Component Value Date    CREATININE 1 27 2019       Lab Results   Component Value Date    PSA 4 2 (H) 2019             Pertinent Imaging      no new films for my review

## 2019-10-11 ENCOUNTER — TELEPHONE (OUTPATIENT)
Dept: UROLOGY | Facility: CLINIC | Age: 69
End: 2019-10-11

## 2019-10-11 ENCOUNTER — OFFICE VISIT (OUTPATIENT)
Dept: UROLOGY | Facility: CLINIC | Age: 69
End: 2019-10-11
Payer: COMMERCIAL

## 2019-10-11 VITALS
DIASTOLIC BLOOD PRESSURE: 80 MMHG | BODY MASS INDEX: 27.73 KG/M2 | SYSTOLIC BLOOD PRESSURE: 134 MMHG | HEART RATE: 90 BPM | WEIGHT: 223 LBS | HEIGHT: 75 IN

## 2019-10-11 DIAGNOSIS — C61 PROSTATE CANCER (HCC): ICD-10-CM

## 2019-10-11 DIAGNOSIS — Z79.818 ENCOUNTER FOR MONITORING ANDROGEN DEPRIVATION THERAPY: ICD-10-CM

## 2019-10-11 DIAGNOSIS — R97.20 ELEVATED PSA: Primary | ICD-10-CM

## 2019-10-11 PROCEDURE — 96402 CHEMO HORMON ANTINEOPL SQ/IM: CPT | Performed by: UROLOGY

## 2019-10-11 PROCEDURE — 99214 OFFICE O/P EST MOD 30 MIN: CPT | Performed by: UROLOGY

## 2019-10-11 NOTE — LETTER
October 11, 2019     Rehana Mejia DO  [address] [facility] [address]    Patient: Enriqueta Cranker   YOB: 1950   Date of Visit: 10/11/2019       Dear Dr Helio Jerry:    Thank you for referring Enriqueta Cranker to me for evaluation  Below are my notes for this consultation  If you have questions, please do not hesitate to call me  I look forward to following your patient along with you  Sincerely,        Shay Hermosillo MD        CC: MD Shay Pitt MD  10/11/2019  1:37 PM  Sign at close encounter       Problem List Items Addressed This Visit        Genitourinary    Prostate cancer [facility])    Relevant Medications    degarelix acetate (FIRMAGON) injection 240 mg       Other    Elevated PSA - Primary    Encounter for monitoring androgen deprivation therapy          Discussion:  Augustina Canales is doing very well, he has decided to pursue radiation therapy, he will get Firmagon loading dosing today, return in 1 month for Lupron, he will follow up for marker placement and SpaceOAR placement as well  All questions and concerns answered and addressed  He will return for the above procedure, he will then procedure radiation therapy, we will then see him on an every 3 to 6 month basis with a PSA prior        Assessment and plan:       Please see problem oriented charting for the assessment plan of today's urological complaints    Shay Hermosillo MD      Chief Complaint     Chief Complaint   Patient presents with    Prostate Cancer   LUTS  Monitoring of androgen deprivation therapy       History of Present Illness     Enriqueta Cranker is a 71 y o  man with intermediate risk prostate cancer  I had a long talk with him at his last visit, he has consulted with Radiation Oncology and has decided upon radiation therapy  All things considered, I think that this is an excellent treatment option for him      His AUA symptom score today is moderate with a total score of 8 but a bother score of just 2 indicating that he is voiding well  Systemically he feels well and has no new bone pain, no issues with his performance status, and his performance status is currently 0 indicating no detriment  I explained to him the mechanism of action of Valeriy Markie today, as well as that of Lupron  We talked about potential hot flashes, changes in fat distribution, tenderness in the breasts, and the need to maintain physical activity, and the detriment to his sexual function that he will experience over the next 6 months or so  He will return in 1 month for a Lupron injection, 6 month depot  He has been ordered for spaceoar as well as fiducial marker placement, he signed informed consent today that we co signed by my colleague Dr Maegan Renteria who is currently performing these procedures for our practice    Detailed Urologic History     - please refer to HPI    Review of Systems     Review of Systems   Constitutional: Negative  HENT: Negative  Eyes: Negative  Respiratory: Negative  Cardiovascular: Negative  Gastrointestinal: Negative  Endocrine: Negative  Genitourinary:        As per HPI   Musculoskeletal: Negative  Skin: Negative  Allergic/Immunologic: Negative  Neurological: Negative  Hematological: Negative  Psychiatric/Behavioral: Negative  Allergies     No Known Allergies    Physical Exam     Physical Exam   Constitutional: He is oriented to person, place, and time  He appears well-developed and well-nourished  No distress  HENT:   Head: Normocephalic and atraumatic  Eyes: Pupils are equal, round, and reactive to light  EOM are normal  Right eye exhibits no discharge  Left eye exhibits no discharge  Neck: No tracheal deviation present  Cardiovascular: Intact distal pulses  Pulmonary/Chest: Effort normal  No stridor  No respiratory distress  Abdominal: Soft  He exhibits no distension and no mass  There is no tenderness   There is no rebound and no guarding  No hernia  Musculoskeletal: Normal range of motion  He exhibits no edema, tenderness or deformity  Neurological: He is alert and oriented to person, place, and time  No cranial nerve deficit  Coordination normal    Skin: Skin is warm and dry  No rash noted  He is not diaphoretic  No erythema  No pallor  Psychiatric: He has a normal mood and affect  His behavior is normal  Judgment and thought content normal    Nursing note and vitals reviewed  Vital Signs  Vitals:    10/11/19 1305   BP: 134/80   BP Location: Left arm   Cuff Size: Standard   Pulse: 90   Weight: 101 kg (223 lb)   Height: 6' 3" (1 905 m)         Current Medications       Current Outpatient Medications:     Acetaminophen (TYLENOL) 325 MG CAPS, Take by mouth as needed , Disp: , Rfl:     aspirin 81 MG tablet, Take 81 mg by mouth daily , Disp: , Rfl:     Multiple Vitamin (MULTIVITAMINS PO), Take by mouth daily , Disp: , Rfl:     Current Facility-Administered Medications:     degarelix acetate (FIRMAGON) injection 240 mg, 240 mg, Subcutaneous, Once, Gladys Lacy MD      Active Problems     Patient Active Problem List   Diagnosis    Mixed hyperlipidemia    Obesity (BMI 30-39  9)    Prediabetes    Elevated PSA    Prostate cancer ([facility] [address] )    Encounter for monitoring androgen deprivation therapy         Past Medical History     Past Medical History:   Diagnosis Date    Benign neoplasm of colon     Colon, diverticulosis     ED (erectile dysfunction) of organic origin     Former tobacco use     Mixed hyperlipidemia     Obesity     Prediabetes          Surgical History     Past Surgical History:   Procedure Laterality Date    COLONOSCOPY  10/30/2014    COLONOSCOPY  2004    COLONOSCOPY  2006    VENTRAL HERNIA REPAIR           Family History     Family History   Problem Relation Age of Onset    Heart disease Father         Cardiac pacemaker    Cancer Sister     Stroke Brother          Social History     Social History     Social History     Tobacco Use   Smoking Status Former Smoker    Packs/day: 0 25    Years: 6 00    Pack years: 1 50    Types: Cigarettes    Last attempt to quit:     Years since quittin 8   Smokeless Tobacco Never Used         Pertinent Lab Values     Lab Results   Component Value Date    CREATININE 1 27 2019       Lab Results   Component Value Date    PSA 4 2 (H) 2019             Pertinent Imaging      no new films for my review

## 2019-10-14 ENCOUNTER — TELEPHONE (OUTPATIENT)
Dept: RADIATION ONCOLOGY | Facility: HOSPITAL | Age: 69
End: 2019-10-14

## 2019-10-14 NOTE — TELEPHONE ENCOUNTER
Fany - Please schedule SIM 1 week after SPACEOAR  Patient is scheduled for [facility] on 11/19/19

## 2019-11-01 ENCOUNTER — TELEPHONE (OUTPATIENT)
Dept: UROLOGY | Facility: AMBULATORY SURGERY CENTER | Age: 69
End: 2019-11-01

## 2019-11-01 NOTE — TELEPHONE ENCOUNTER
Miller Weems from [employer] contacted me  They have been trying to contact Javier to schedule SIM  I called him and L/M x 2 at [phone] and [phone] (cell # on communication consent)  Left my direct extension and Radiation Oncology's # [phone]  Requested a call back

## 2019-11-05 ENCOUNTER — ANESTHESIA EVENT (OUTPATIENT)
Dept: PERIOP | Facility: AMBULARY SURGERY CENTER | Age: 69
End: 2019-11-05
Payer: COMMERCIAL

## 2019-11-07 NOTE — TELEPHONE ENCOUNTER
Jas Grover from [employer] made me aware that they sent [facility] a letter  They are not able to contact him

## 2019-11-11 ENCOUNTER — PROCEDURE VISIT (OUTPATIENT)
Dept: UROLOGY | Facility: CLINIC | Age: 69
End: 2019-11-11
Payer: COMMERCIAL

## 2019-11-11 VITALS
BODY MASS INDEX: 28.23 KG/M2 | HEIGHT: 75 IN | DIASTOLIC BLOOD PRESSURE: 80 MMHG | SYSTOLIC BLOOD PRESSURE: 132 MMHG | HEART RATE: 82 BPM | WEIGHT: 227 LBS

## 2019-11-11 DIAGNOSIS — C61 PROSTATE CANCER (HCC): Primary | ICD-10-CM

## 2019-11-11 PROCEDURE — 96402 CHEMO HORMON ANTINEOPL SQ/IM: CPT

## 2019-11-11 NOTE — PROGRESS NOTES
11/11/2019  Angelica Prakash is a 71 y o  male  [id number]    Diagnosis:  Chief Complaint     Prostate Cancer; Elevated PSA          Patient presents for Lupron managed by Dr Eleanor Javed:  Follow up as scheduled for surgery on 11/19/19      Medication administration:    Reviewed with patient possible side effects of Lupron  Patient tolerated the injection well and fransisco any other questions or concerns       Administrations This Visit     leuprolide (LUPRON DEPOT 6 MONTH KIT) IM injection kit 45 mg     Admin Date  11/11/2019 Action  Given Dose  45 mg Route  Intramuscular Administered By  Luis Fernando Israel RN                Vitals:    11/11/19 0820   BP: 132/80   BP Location: Right arm   Patient Position: Sitting   Cuff Size: Standard   Pulse: 82   Weight: 103 kg (227 lb)   Height: 6' 3" (1 905 m)         Luis Fernando Israel RN

## 2019-11-12 NOTE — PRE-PROCEDURE INSTRUCTIONS
Pre-Surgery Instructions:   Medication Instructions    Acetaminophen (TYLENOL) 325 MG CAPS Instructed patient per Anesthesia Guidelines   aspirin 81 MG tablet Patient was instructed by Physician and understands   Multiple Vitamin (MULTIVITAMINS PO) Instructed patient per Anesthesia Guidelines      Pre op,medications and showering instructions reviewed-Instructed to purchase Hibiclens by surgeon's office- Patient has hibiclens-Instructed to follow any bowel prep instructions from surgeon's office/packet

## 2019-11-14 ENCOUNTER — OFFICE VISIT (OUTPATIENT)
Dept: LAB | Facility: HOSPITAL | Age: 69
End: 2019-11-14
Attending: UROLOGY
Payer: COMMERCIAL

## 2019-11-14 DIAGNOSIS — C61 PROSTATE CANCER (HCC): ICD-10-CM

## 2019-11-14 LAB
ATRIAL RATE: 63 BPM
P AXIS: 58 DEGREES
PR INTERVAL: 174 MS
QRS AXIS: -11 DEGREES
QRSD INTERVAL: 88 MS
QT INTERVAL: 438 MS
QTC INTERVAL: 448 MS
T WAVE AXIS: 11 DEGREES
VENTRICULAR RATE: 63 BPM

## 2019-11-14 PROCEDURE — 93005 ELECTROCARDIOGRAM TRACING: CPT

## 2019-11-14 PROCEDURE — 93010 ELECTROCARDIOGRAM REPORT: CPT | Performed by: INTERNAL MEDICINE

## 2019-11-15 ENCOUNTER — TELEPHONE (OUTPATIENT)
Dept: RADIATION ONCOLOGY | Facility: CLINIC | Age: 69
End: 2019-11-15

## 2019-11-19 ENCOUNTER — HOSPITAL ENCOUNTER (OUTPATIENT)
Facility: AMBULARY SURGERY CENTER | Age: 69
Setting detail: OUTPATIENT SURGERY
Discharge: HOME | End: 2019-11-19
Attending: UROLOGY | Admitting: UROLOGY
Payer: COMMERCIAL

## 2019-11-19 ENCOUNTER — ANESTHESIA (OUTPATIENT)
Dept: PERIOP | Facility: AMBULARY SURGERY CENTER | Age: 69
End: 2019-11-19
Payer: COMMERCIAL

## 2019-11-19 VITALS
HEIGHT: 75 IN | RESPIRATION RATE: 17 BRPM | WEIGHT: 220 LBS | TEMPERATURE: 98.4 F | OXYGEN SATURATION: 98 % | SYSTOLIC BLOOD PRESSURE: 160 MMHG | HEART RATE: 76 BPM | BODY MASS INDEX: 27.35 KG/M2 | DIASTOLIC BLOOD PRESSURE: 92 MMHG

## 2019-11-19 DIAGNOSIS — C61 PROSTATE CANCER (HCC): Primary | ICD-10-CM

## 2019-11-19 PROCEDURE — 55874 TPRNL PLMT BIODEGRDABL MATRL: CPT | Performed by: UROLOGY

## 2019-11-19 PROCEDURE — A4648 IMPLANTABLE TISSUE MARKER: HCPCS | Performed by: UROLOGY

## 2019-11-19 PROCEDURE — NC001 PR NO CHARGE: Performed by: UROLOGY

## 2019-11-19 PROCEDURE — 55876 PLACE RT DEVICE/MARKER PROS: CPT | Performed by: UROLOGY

## 2019-11-19 DEVICE — MARKER FIDUCIARY 1.2 X 3MM W/17GA X 20CM NEEDLE: Type: IMPLANTABLE DEVICE | Status: FUNCTIONAL

## 2019-11-19 RX ORDER — PROMETHAZINE HYDROCHLORIDE 25 MG/ML
12.5 INJECTION, SOLUTION INTRAMUSCULAR; INTRAVENOUS ONCE AS NEEDED
Status: DISCONTINUED | OUTPATIENT
Start: 2019-11-19 | End: 2019-11-19 | Stop reason: HOSPADM

## 2019-11-19 RX ORDER — FENTANYL CITRATE/PF 50 MCG/ML
25 SYRINGE (ML) INJECTION
Status: DISCONTINUED | OUTPATIENT
Start: 2019-11-19 | End: 2019-11-19 | Stop reason: HOSPADM

## 2019-11-19 RX ORDER — HYDROMORPHONE HCL/PF 1 MG/ML
0.5 SYRINGE (ML) INJECTION
Status: DISCONTINUED | OUTPATIENT
Start: 2019-11-19 | End: 2019-11-19 | Stop reason: HOSPADM

## 2019-11-19 RX ORDER — LIDOCAINE HYDROCHLORIDE 10 MG/ML
0.5 INJECTION, SOLUTION EPIDURAL; INFILTRATION; INTRACAUDAL; PERINEURAL ONCE AS NEEDED
Status: DISCONTINUED | OUTPATIENT
Start: 2019-11-19 | End: 2019-11-19 | Stop reason: HOSPADM

## 2019-11-19 RX ORDER — LIDOCAINE HYDROCHLORIDE 10 MG/ML
INJECTION, SOLUTION INFILTRATION; PERINEURAL AS NEEDED
Status: DISCONTINUED | OUTPATIENT
Start: 2019-11-19 | End: 2019-11-19 | Stop reason: SURG

## 2019-11-19 RX ORDER — MEPERIDINE HYDROCHLORIDE 25 MG/ML
12.5 INJECTION INTRAMUSCULAR; INTRAVENOUS; SUBCUTANEOUS AS NEEDED
Status: DISCONTINUED | OUTPATIENT
Start: 2019-11-19 | End: 2019-11-19 | Stop reason: HOSPADM

## 2019-11-19 RX ORDER — ONDANSETRON 2 MG/ML
4 INJECTION INTRAMUSCULAR; INTRAVENOUS ONCE AS NEEDED
Status: DISCONTINUED | OUTPATIENT
Start: 2019-11-19 | End: 2019-11-19 | Stop reason: HOSPADM

## 2019-11-19 RX ORDER — DEXAMETHASONE SODIUM PHOSPHATE 10 MG/ML
INJECTION, SOLUTION INTRAMUSCULAR; INTRAVENOUS AS NEEDED
Status: DISCONTINUED | OUTPATIENT
Start: 2019-11-19 | End: 2019-11-19 | Stop reason: SURG

## 2019-11-19 RX ORDER — TRAMADOL HYDROCHLORIDE 50 MG/1
50 TABLET ORAL EVERY 6 HOURS PRN
Status: DISCONTINUED | OUTPATIENT
Start: 2019-11-19 | End: 2019-11-19 | Stop reason: HOSPADM

## 2019-11-19 RX ORDER — FENTANYL CITRATE 50 UG/ML
INJECTION, SOLUTION INTRAMUSCULAR; INTRAVENOUS AS NEEDED
Status: DISCONTINUED | OUTPATIENT
Start: 2019-11-19 | End: 2019-11-19 | Stop reason: SURG

## 2019-11-19 RX ORDER — ONDANSETRON 2 MG/ML
INJECTION INTRAMUSCULAR; INTRAVENOUS AS NEEDED
Status: DISCONTINUED | OUTPATIENT
Start: 2019-11-19 | End: 2019-11-19 | Stop reason: SURG

## 2019-11-19 RX ORDER — PROPOFOL 10 MG/ML
INJECTION, EMULSION INTRAVENOUS AS NEEDED
Status: DISCONTINUED | OUTPATIENT
Start: 2019-11-19 | End: 2019-11-19 | Stop reason: SURG

## 2019-11-19 RX ORDER — LIDOCAINE HYDROCHLORIDE 10 MG/ML
INJECTION, SOLUTION EPIDURAL; INFILTRATION; INTRACAUDAL; PERINEURAL AS NEEDED
Status: DISCONTINUED | OUTPATIENT
Start: 2019-11-19 | End: 2019-11-19 | Stop reason: HOSPADM

## 2019-11-19 RX ORDER — LABETALOL 20 MG/4 ML (5 MG/ML) INTRAVENOUS SYRINGE
5
Status: DISCONTINUED | OUTPATIENT
Start: 2019-11-19 | End: 2019-11-19 | Stop reason: HOSPADM

## 2019-11-19 RX ORDER — ALBUTEROL SULFATE 2.5 MG/3ML
2.5 SOLUTION RESPIRATORY (INHALATION) ONCE AS NEEDED
Status: DISCONTINUED | OUTPATIENT
Start: 2019-11-19 | End: 2019-11-19 | Stop reason: HOSPADM

## 2019-11-19 RX ORDER — SODIUM CHLORIDE, SODIUM LACTATE, POTASSIUM CHLORIDE, CALCIUM CHLORIDE 600; 310; 30; 20 MG/100ML; MG/100ML; MG/100ML; MG/100ML
125 INJECTION, SOLUTION INTRAVENOUS CONTINUOUS
Status: DISCONTINUED | OUTPATIENT
Start: 2019-11-19 | End: 2019-11-19 | Stop reason: HOSPADM

## 2019-11-19 RX ORDER — TRAMADOL HYDROCHLORIDE 50 MG/1
50 TABLET ORAL EVERY 6 HOURS PRN
Qty: 5 TABLET | Refills: 0 | Status: SHIPPED | OUTPATIENT
Start: 2019-11-19 | End: 2019-11-26 | Stop reason: ALTCHOICE

## 2019-11-19 RX ADMIN — CEFTRIAXONE SODIUM 1000 MG: 10 INJECTION, POWDER, FOR SOLUTION INTRAVENOUS at 13:07

## 2019-11-19 RX ADMIN — FENTANYL CITRATE 50 MCG: 50 INJECTION, SOLUTION INTRAMUSCULAR; INTRAVENOUS at 13:28

## 2019-11-19 RX ADMIN — PHENYLEPHRINE HYDROCHLORIDE 100 MCG: 10 INJECTION INTRAVENOUS at 13:13

## 2019-11-19 RX ADMIN — LIDOCAINE HYDROCHLORIDE 50 MG: 10 INJECTION, SOLUTION INFILTRATION; PERINEURAL at 13:01

## 2019-11-19 RX ADMIN — DEXAMETHASONE SODIUM PHOSPHATE 4 MG: 10 INJECTION, SOLUTION INTRAMUSCULAR; INTRAVENOUS at 13:21

## 2019-11-19 RX ADMIN — PHENYLEPHRINE HYDROCHLORIDE 100 MCG: 10 INJECTION INTRAVENOUS at 13:21

## 2019-11-19 RX ADMIN — PROPOFOL 200 MG: 10 INJECTION, EMULSION INTRAVENOUS at 13:01

## 2019-11-19 RX ADMIN — SODIUM CHLORIDE, SODIUM LACTATE, POTASSIUM CHLORIDE, AND CALCIUM CHLORIDE 125 ML/HR: .6; .31; .03; .02 INJECTION, SOLUTION INTRAVENOUS at 12:49

## 2019-11-19 RX ADMIN — ONDANSETRON 4 MG: 2 INJECTION INTRAMUSCULAR; INTRAVENOUS at 13:28

## 2019-11-19 RX ADMIN — FENTANYL CITRATE 50 MCG: 50 INJECTION, SOLUTION INTRAMUSCULAR; INTRAVENOUS at 13:05

## 2019-11-19 NOTE — ANESTHESIA POSTPROCEDURE EVALUATION
Post-Op Assessment Note    CV Status:  Stable    Pain management: adequate     Mental Status:  Alert and awake   Hydration Status:  Euvolemic   PONV Controlled:  Controlled   Airway Patency:  Patent   Post Op Vitals Reviewed: Yes      Staff: CRNA           BP (P) 134/94 (11/19/19 1340)    Temp (P) 98 4 °F (36 9 °C) (11/19/19 1340)    Pulse (P) 80 (11/19/19 1340)   Resp (P) 16 (11/19/19 1340)    SpO2 (P) 97 % (11/19/19 1340)

## 2019-11-19 NOTE — H&P
UROLOGY PREOPERATIVE H&P NOTE     CHIEF COMPLAINT   Dorota Ramirez is a 71 y o  male with a complaint of prostate cancer    History of Present Illness:     71 y o  male diagnosed with intermediate risk prostate cancer  Patient has seen both his urologist and Radiation Oncology  Patient has been significantly counseled and has opted to move forward with radiation with concomitant androgen deprivation therapy  As part of the treatment, he would also like implantation of fiducial markers and SpaceOAR perirectal gel  He presents for this procedure today      Past Medical History:     Past Medical History:   Diagnosis Date    Benign neoplasm of colon     Cancer ([address] )     Prostate CA    Colon, diverticulosis     ED (erectile dysfunction) of organic origin     Former tobacco use     Mixed hyperlipidemia     Obesity     Prediabetes        PAST SURGICAL HISTORY:     Past Surgical History:   Procedure Laterality Date    COLONOSCOPY  10/30/2014    COLONOSCOPY  2004    COLONOSCOPY  2006    VENTRAL HERNIA REPAIR         CURRENT MEDICATIONS:     Current Facility-Administered Medications   Medication Dose Route Frequency Provider Last Rate Last Dose    cefTRIAXone (ROCEPHIN) 1,000 mg in dextrose 5 % 50 mL IVPB  1,000 mg Intravenous Q24H Tonja Razo MD        lactated ringers infusion  125 mL/hr Intravenous Continuous Noelle Calvo MD        lidocaine (PF) (XYLOCAINE-MPF) 1 % injection 0 5 mL  0 5 mL Infiltration Once PRN Noelle Calvo MD           ALLERGIES:   No Known Allergies    SOCIAL HISTORY:     Social History     Socioeconomic History    Marital status: /Civil Union     Spouse name: None    Number of children: None    Years of education: None    Highest education level: None   Occupational History    None   Social Needs    Financial resource strain: None    Food insecurity:     Worry: None     Inability: None    Transportation needs:     Medical: None     Non-medical: None Tobacco Use    Smoking status: Former Smoker     Packs/day: 0 25     Years: 6 00     Pack years: 1 50     Types: Cigarettes     Last attempt to quit:      Years since quittin 9    Smokeless tobacco: Never Used   Substance and Sexual Activity    Alcohol use: Yes     Frequency: Monthly or less     Drinks per session: 1 or 2     Binge frequency: Never     Comment: socially    Drug use: No    Sexual activity: Yes     Partners: Female   Lifestyle    Physical activity:     Days per week: 7 days     Minutes per session: 10 min    Stress: Not at all   Relationships    Social connections:     Talks on phone: None     Gets together: None     Attends Religion service: None     Active member of club or organization: None     Attends meetings of clubs or organizations: None     Relationship status: None    Intimate partner violence:     Fear of current or ex partner: None     Emotionally abused: None     Physically abused: None     Forced sexual activity: None   Other Topics Concern    None   Social History Narrative    None       SOCIAL HISTORY:     Family History   Problem Relation Age of Onset    Heart disease Father         Cardiac pacemaker    Cancer Sister     Stroke Brother        REVIEW OF SYSTEMS:     Review of Systems   Constitutional: Negative  Respiratory: Negative  Cardiovascular: Negative  Gastrointestinal: Negative  Genitourinary: Negative  Musculoskeletal: Negative  Skin: Negative  Psychiatric/Behavioral: Negative  PHYSICAL EXAM:     Ht 6' 3" (1 905 m)   Wt 103 kg (227 lb)   BMI 28 37 kg/m²     General:  Healthy appearing male in no acute distress  They have a normal affect  There is not appear to be any gross neurologic defects or abnormalities  HEENT:  Normocephalic, atraumatic  Neck is supple without any palpable lymphadenopathy  Cardiovascular:  Patient has normal palpable distal radial pulses  There is no significant peripheral edema   No JVD is noted   Respiratory:  Patient has unlabored respirations  There is no audible wheeze or rhonchi  Abdomen:  Abdomen is soft and nontender  There is no tympany  Inguinal and umbilical hernia are not appreciated  Musculoskeletal:  Patient does not have significant CVA tenderness in the  flank with palpation or percussion  They full range of motion in all 4 extremities  Strength in all 4 extremities appears congruent  Patient is able to ambulate without assistance or difficulty  Dermatologic:  Patient has no skin abnormalities or rashes  LABS:     CBC: No results found for: WBC, HGB, HCT, MCV, PLT    BMP:   Lab Results   Component Value Date    CALCIUM 8 6 05/20/2019    K 4 1 05/20/2019    CO2 26 05/20/2019     05/20/2019    BUN 18 05/20/2019    CREATININE 1 27 05/20/2019     Lab Results   Component Value Date    PSA 4 2 (H) 05/20/2019     PATHOLOGY:     8/23/19  Final Diagnosis   A  Prostate, right lateral base, core needle biopsy:             - Benign prostate glands  - No malignancy is identified      B  Prostate, right medial base, core needle biopsy:             - Benign prostate glands  - No malignancy is identified      C  Prostate, right lateral mid, core needle biopsy:             - Focal high-grade PIN              - No prostatic adenocarcinoma is identified      D  Prostate, right medial mid, core needle biopsy:             - Benign prostate glands  - No malignancy is identified      E  Prostate, right lateral apex, core needle biopsy:             - Benign prostate glands  - No malignancy is identified      F  Prostate, right medial apex, core needle biopsy:             - Benign prostate glands  - No malignancy is identified      G  Prostate, left lateral base, core needle biopsy:             - Prostatic adenocarcinoma, Faizan score 3 + 4 = 7, Prognostic Grade Group II, discontinuously involving 60% of one core biopsy  - Approximately 10% Fargo pattern 4                        - No perineural invasion is identified              - No lymphovascular invasion is identified              - No extraprostatic extension is identified      H  Prostate, left medial, core needle biopsy:             - Prostatic adenocarcinoma, Faizan score 3 + 3 = 6, Prognostic Grade Group I, involving less than  5% of one disrupted core biopsy              - No perineural invasion is identified              - No lymphovascular invasion is identified              - No extraprostatic extension is identified      I  Prostate, left lateral mid, core needle biopsy:             - Small focus of atypical prostate glands, suspicious for prostatic adenocarcinoma, involving less than 5% of one core biopsy      J  Prostate, left medial mid, core needle biopsy:             - Prostatic adenocarcinoma, Faizan score 3 + 4 = 7, Prognostic Grade Group II, discontinuously involving 80% of one core biopsy  - Approximately 20% Fargo pattern 4              - No perineural invasion is identified              - No lymphovascular invasion is identified              - No extraprostatic extension is identified      K  Prostate, left lateral apex, core needle biopsy:             - Prostatic adenocarcinoma, Faizan score 3 + 3 = 6, Prognostic Grade Group I, involving 5% of one core biopsy              - No perineural invasion is identified              - No lymphovascular invasion is identified              - No extraprostatic extension is identified      L  Prostate, left medial apex, core needle biopsy:             - Prostatic adenocarcinoma, Fargo 3 + 3 = 6, Prognostic Grade Group I, involving less than 5% of one core biopsy              - No perineural invasion is identified              - No lymphovascular invasion is identified              - No extraprostatic extension is identified           ASSESSMENT:     71 y o  male with prostate cancer    PLAN: Patient I discussed treatment for his prostate cancer with external beam radiation  We discussed the adjunctive options including the fiducial marker placement and SpaceOAR gel  Discussed risks and benefits of the implantation of this procedure and the expected postoperative course  Patient is in agreement to proceed and has signed informed consent

## 2019-11-19 NOTE — DISCHARGE INSTRUCTIONS
I discussed with the patient potential side effects from prostate Implant including bleeding from his bladder, bleeding from his rectum, and bleeding in his semen up to about 2-4 weeks  The patient knows that should he have severe uncontrolled bleeding he is to call the office or proceed immediately to the emergency room  Additionally counseled the patient to monitor for fevers greater 100 3

## 2019-11-19 NOTE — ANESTHESIA PREPROCEDURE EVALUATION
Review of Systems/Medical History  Patient summary reviewed        Cardiovascular  Negative cardio ROS Exercise tolerance (METS): >4,  Hyperlipidemia,    Pulmonary  Negative pulmonary ROS        GI/Hepatic  Negative GI/hepatic ROS          Negative  ROS Prostatic disorder, history of prostate cancer       Endo/Other  Negative endo/other ROS      GYN  Negative gynecology ROS          Hematology  Negative hematology ROS      Musculoskeletal  Negative musculoskeletal ROS        Neurology  Negative neurology ROS      Psychology   Negative psychology ROS              Physical Exam    Airway    Mallampati score: II  TM Distance: >3 FB  Neck ROM: full     Dental   No notable dental hx     Cardiovascular  Comment: Negative ROS,     Pulmonary      Other Findings        Anesthesia Plan  ASA Score- 2     Anesthesia Type- general with ASA Monitors  Additional Monitors:   Airway Plan: LMA  Comment: Patient seen and examined, history reviewed  Patient to be done under general anesthesia with LMA and routine monitors  Risks discussed with the patient, consent obtained        Plan Factors-    Induction- intravenous  Postoperative Plan-     Informed Consent- Anesthetic plan and risks discussed with patient  I personally reviewed this patient with the CRNA  Discussed and agreed on the Anesthesia Plan with the CRNA  Mauricio Weir

## 2019-11-19 NOTE — OP NOTE
OPERATIVE REPORT  PATIENT NAME: Chidi Martinez    :  1950  MRN: [id number]  Pt Location: [facility] OR ROOM 06    SURGERY DATE: 2019    Surgeon(s) and Role:     * Eden Essex, MD - Primary    Preop Diagnosis:  Prostate cancer ([facility] [address] ) Molina Hipps    Post-Op Diagnosis Codes:     * Prostate cancer ([address] ) [place] Hipps    Procedure(s) (LRB):  TISSUE MARKER INSERTION, SPACEOAR INSERTION (N/A)    Specimen(s):  * No specimens in log *    Estimated Blood Loss:   Minimal    Drains:  * No LDAs found *    Anesthesia Type:   General    Operative Indications:  Prostate cancer ([facility] [address] ) [C61]      Operative Findings: Three fiducial marker and SpaceOAR gel implanted    Complications:   None    Procedure and Technique:  The patient has prostate cancer and has elected to undergo radiation therapy  To shorten the duration of treatment, radiation oncology has requested fiducial marker placement and transperineal SpaceOar (R) Hydrogel placement transperineally  I have explained to the patient the procedure, and the risks of bleeding, infection and pelvic pain due to the gel  Also if I cannot develop a plane properly between the prostate and the rectum or if there is perforation of the rectum with the needle the procedure will have to be aborted  He understands and gives informed consent  The patient was brought to the operating room and identified properly  IV sedation was induced, he was prepped and draped in the dorsal lithotomy position  Care was taken to ensure that all pressure points were addressed  He was prepped with Hibiclens, and the scrotum was elevated above the perineum with IO band  A time-out was performed, and the biplanar transrectal ultrasound probe was placed into the rectum and sagittal and axial views were obtained of the prostate  First, the fiducial markers were placed    Three markers were placed with 3 individual needles and ultrasound guidance both sagittally and axially was used to place a gold marker in the center of each lobe of the prostate and 1 at the base  Once this was done, the Space Oar kit was opened and the individual syringes were prepared and placed into the device, taking care not to mix hard inner with the Hydrogel which could cause occlusion of the needle  Once this was done, the needle itself was introduced in the midline of the perineum approximately 1 inch from the anus into the prostate under sagittal guidance-the needle was advanced above the rectal tent into the fat planes that lie between the prostate and the rectum and I advanced the needle to approximately MCC along the length of the gland  Sterile saline was injected for approximately 0 5 cc to make sure I was in the proper area and not in the prostate capsule nor in the wall the rectum  This confirmed that I was indeed in the right area, and I performed hydrodissection with a couple cc of saline  I then attached the 2 syringes with the [place]er Lock (the "Space Oar") and reconfirmed that I was in the right area, then injected both syringes simultaneously with the device containing the Hydrogel and the hardener into the space over a 15-30 second time  and watched it spread out posteriorly and anteriorly to the apex  It also spread out laterally and covered space over the anterior rectum nicely  Once I was satisfied with this, I withdrew the needle and held pressure against the perineum for a minute or 2  The patient tolerated the procedure well         I was present for the entire procedure    Patient Disposition:  PACU     SIGNATURE: Adriana Luna MD  DATE: November 19, 2019  TIME: 1:34 PM

## 2019-11-25 ENCOUNTER — RADIATION ONCOLOGY FOLLOW-UP (OUTPATIENT)
Dept: RADIATION ONCOLOGY | Facility: CLINIC | Age: 69
End: 2019-11-25
Attending: RADIOLOGY
Payer: COMMERCIAL

## 2019-11-25 VITALS
DIASTOLIC BLOOD PRESSURE: 80 MMHG | SYSTOLIC BLOOD PRESSURE: 128 MMHG | RESPIRATION RATE: 16 BRPM | BODY MASS INDEX: 28.04 KG/M2 | WEIGHT: 225.5 LBS | HEART RATE: 80 BPM | HEIGHT: 75 IN

## 2019-11-25 DIAGNOSIS — C61 PROSTATE CANCER (HCC): Primary | ICD-10-CM

## 2019-11-25 PROCEDURE — G0463 HOSPITAL OUTPT CLINIC VISIT: HCPCS | Performed by: RADIOLOGY

## 2019-11-25 PROCEDURE — 77334 RADIATION TREATMENT AID(S): CPT | Performed by: RADIOLOGY

## 2019-11-25 PROCEDURE — 99211 OFF/OP EST MAY X REQ PHY/QHP: CPT | Performed by: RADIOLOGY

## 2019-11-25 NOTE — PROGRESS NOTES
Allen Roa 1950 is a 71 y o  male     Follow up visit     Oncology History    Patient presents for prostate sim and to update consent form  His initial consult was on 9/13/19      10/11/19 Verges/Urology: Daniel Cuevas injection  Lupron in 1 month  11/11/19 Lupron injection    11/19/19 Tissue marker insertion, Space Oar insertion    Some hot flashes from Lupron        Prostate cancer ([facility] [address] )    8/23/2019 Biopsy     Final Diagnosis  A  Prostate, right lateral base, core needle biopsy: Benign prostate glands  No malignancy is identified      B  Prostate, right medial base, core needle biopsy: Benign prostate glands  No malignancy is identified      C  Prostate, right lateral mid, core needle biopsy: Focal high-grade PIN  No prostatic adenocarcinoma is identified      D  Prostate, right medial mid, core needle biopsy:Benign prostate glands  No malignancy is identified      E  Prostate, right lateral apex, core needle biopsy: Benign prostate glands  No malignancy is identified      F  Prostate, right medial apex, core needle biopsy: Benign prostate glands  No malignancy is identified      G  Prostate, left lateral base, core needle biopsy:             - Prostatic adenocarcinoma, Faizan score 3 + 4 = 7, Prognostic Grade Group II, discontinuously involving 60% of one core biopsy               - Approximately 10% Faizan pattern 4                         H  Prostate, left medial, core needle biopsy:             - Prostatic adenocarcinoma, Faizan score 3 + 3 = 6, Prognostic Grade Group I, involving less than  5% of one disrupted core biopsy                I  Prostate, left lateral mid, core needle biopsy:             - Small focus of atypical prostate glands, suspicious for prostatic adenocarcinoma, involving less than 5% of one core biopsy      J  Prostate, left medial mid, core needle biopsy:             - Prostatic adenocarcinoma, [place] score 3 + 4 = 7, Prognostic Grade Group II, discontinuously involving 80% of one core biopsy  - Approximately 20% Faizan pattern 4               K  Prostate, left lateral apex, core needle biopsy:             - Prostatic adenocarcinoma, [place] score 3 + 3 = 6, Prognostic Grade Group I, involving 5% of one core biopsy  L  Prostate, left medial apex, core needle biopsy:             - Prostatic adenocarcinoma, [place] 3 + 3 = 6, Prognostic Grade Group I, involving less than 5% of one core biopsy  9/6/2019 Initial Diagnosis     Prostate cancer ([facility] )      9/23/2019 -  Cancer Staged     Staging form: Prostate, AJCC 8th Edition  - Clinical: Stage IIB (cT1c, cN0, cM0, PSA: 4 2, Grade Group: 2) - Signed by Nita Baxter MD on 9/23/2019  Prostate specific antigen (PSA) range: Less than 10  Histologic grading system: 5 grade system  Macon score: 7        10/11/2019 -  Hormone Therapy     Firmagon on 10/11/19 and Lupron on 11/11/19       Health Maintenance   Topic Date Due    CRC Screening: Colonoscopy  10/30/2017    Influenza Vaccine  07/01/2019    Fall Risk  05/20/2020    BMI: Followup Plan  05/20/2020    Depression Screening PHQ  09/13/2020    BMI: Adult  11/19/2020    DTaP,Tdap,and Td Vaccines (2 - Td) 05/03/2026    Hepatitis C Screening  Completed    Pneumococcal Vaccine: 65+ Years  Completed    Pneumococcal Vaccine: Pediatrics (0 to 5 Years) and At-Risk Patients (6 to 59 Years)  Aged Out    HIB Vaccine  Aged Out    Hepatitis B Vaccine  Aged Out    IPV Vaccine  Aged Out    Hepatitis A Vaccine  Aged Out    Meningococcal ACWY Vaccine  Aged Out    HPV Vaccine  Aged Out       Patient Active Problem List   Diagnosis    Mixed hyperlipidemia    Obesity (BMI 30-39  9)    Prediabetes    Elevated PSA    Prostate cancer ([facility] )    Encounter for monitoring androgen deprivation therapy     Past Medical History:   Diagnosis Date    Benign neoplasm of colon     Cancer ([facility] )     Prostate CA    Colon, diverticulosis     ED (erectile dysfunction) of organic origin     Former tobacco use     Mixed hyperlipidemia     Obesity     Prediabetes      Past Surgical History:   Procedure Laterality Date    COLONOSCOPY  10/30/2014    COLONOSCOPY  2004    COLONOSCOPY  2006    RADIOACTIVE PLAQUE INSERTION N/A 2019    Procedure: TISSUE MARKER INSERTION, SPACEOAR INSERTION;  Surgeon: Bárbara Olmedo MD;  [facility];  Service: Urology    VENTRAL HERNIA REPAIR       Family History   Problem Relation Age of Onset    Heart disease Father         Cardiac pacemaker    Cancer Sister     Stroke Brother      Social History     Socioeconomic History    Marital status: /Civil Union     Spouse name: Not on file    Number of children: Not on file    Years of education: Not on file    Highest education level: Not on file   Occupational History    Not on file   Social Needs    Financial resource strain: Not on file    Food insecurity:     Worry: Not on file     Inability: Not on file    Transportation needs:     Medical: Not on file     Non-medical: Not on file   Tobacco Use    Smoking status: Former Smoker     Packs/day: 0 25     Years: 6 00     Pack years: 1 50     Types: Cigarettes     Last attempt to quit:      Years since quittin 9    Smokeless tobacco: Never Used   Substance and Sexual Activity    Alcohol use: Yes     Frequency: Monthly or less     Drinks per session: 1 or 2     Binge frequency: Never     Comment: socially    Drug use: No    Sexual activity: Yes     Partners: Female   Lifestyle    Physical activity:     Days per week: 7 days     Minutes per session: 10 min    Stress: Not at all   Relationships    Social connections:     Talks on phone: Not on file     Gets together: Not on file     Attends Shinto service: Not on file     Active member of club or organization: Not on file     Attends meetings of clubs or organizations: Not on file     Relationship status: Not on file    Intimate partner violence: Fear of current or ex partner: Not on file     Emotionally abused: Not on file     Physically abused: Not on file     Forced sexual activity: Not on file   Other Topics Concern    Not on file   Social History Narrative    Not on file       Current Outpatient Medications:     Acetaminophen (TYLENOL) 325 MG CAPS, Take by mouth as needed , Disp: , Rfl:     Multiple Vitamin (MULTIVITAMINS PO), Take by mouth daily , Disp: , Rfl:     traMADol (ULTRAM) 50 mg tablet, Take 1 tablet (50 mg total) by mouth every 6 (six) hours as needed for moderate pain for up to 5 doses, Disp: 5 tablet, Rfl: 0  No Known Allergies    Review of Systems:  Review of Systems   Constitutional: Negative  HENT: Negative  Eyes: Negative  Respiratory: Negative  Gastrointestinal: Negative  Endocrine: Negative  Genitourinary: Positive for frequency (q 2 hours)  Negative for dysuria, hematuria and urgency  Nocturia x 2-3   Musculoskeletal: Negative  Allergic/Immunologic: Negative  Neurological: Negative  Hematological: Negative  Psychiatric/Behavioral: Negative  Vitals:    11/25/19 0825   BP: 128/80   BP Location: Right arm   Patient Position: Sitting   Cuff Size: Standard   Pulse: 80   Resp: 16   Weight: 102 kg (225 lb 8 oz)   Height: 6' 3" (1 905 m)    Oxygen 96%    Pain assessment:0    Pain Score: 0-No pain      Imaging:No results found      Teaching reinforced

## 2019-11-25 NOTE — PROGRESS NOTES
Follow-up - Radiation Oncology   Gordon Hooks 1950 71 y o  male [id number]      History of Present Illness   Cancer Staging  Prostate cancer [facility])  Staging form: Prostate, AJCC 8th Edition  - Clinical: Stage IIB (cT1c, cN0, cM0, PSA: 4 2, Grade Group: 2) - Signed by Odalys Hughes MD on 9/23/2019  Prostate specific antigen (PSA) range: Less than 10  Histologic grading system: 5 grade system  Faizan score: 7      Gordon Hooks is a 71y o  year old male and Jewish with a history of clinical stage T1c prostatic adenocarcinoma with mixed Faizan score 6 and 7 (3+4) involving a 5/12 cores and a pretreatment PSA of 4 2 ng/mL  His clinical presentation, is consistent with unfavorable intermediate risk prostatic adenocarcinoma  He was initially seen in consultation on 9/13/19 and offered definitive radiation with 6 month course of ADT  He agreed and returns today for radiation planning  Interval history significant:  10/11/19 Verges/Urology: Constanza Eriksson injection   11/11/19 Lupron injection   11/19/19 Tissue marker insertion, Space Oar insertion     Currently, the patient states that he generally feels well  He notes some hot flashes from Lupron  He denies any significant urinary symptoms including hematuria, dysuria, or urinary incontinence  He denies diarrhea or rectal bleeding  He denies any problems post Space Oar and marker insertion  Historical Information      Prostate [facility] ([facility] )    8/23/2019 Biopsy     Final Diagnosis  A  Prostate, right lateral base, core needle biopsy: Benign prostate glands  No malignancy is identified      B  Prostate, right medial base, core needle biopsy: Benign prostate glands  No malignancy is identified      C  Prostate, right lateral mid, core needle biopsy: Focal high-grade PIN  No prostatic adenocarcinoma is identified      D  Prostate, right medial mid, core needle biopsy:Benign prostate glands  No malignancy is identified      E   Prostate, right lateral apex, core needle biopsy: Benign prostate glands  No malignancy is identified      F  Prostate, right medial apex, core needle biopsy: Benign prostate glands  No malignancy is identified      G  Prostate, left lateral base, core needle biopsy:             - Prostatic adenocarcinoma, [place] score 3 + 4 = 7, Prognostic Grade Group II, discontinuously involving 60% of one core biopsy  - Approximately 10% Faizan pattern 4                         H  Prostate, left medial, core needle biopsy:             - Prostatic adenocarcinoma, [place] score 3 + 3 = 6, Prognostic Grade Group I, involving less than  5% of one disrupted core biopsy                I  Prostate, left lateral mid, core needle biopsy:             - Small focus of atypical prostate glands, suspicious for prostatic adenocarcinoma, involving less than 5% of one core biopsy      J  Prostate, left medial mid, core needle biopsy:             - Prostatic adenocarcinoma, [place] score 3 + 4 = 7, Prognostic Grade Group II, discontinuously involving 80% of one core biopsy  - Approximately 20% Faizan pattern 4               K  Prostate, left lateral apex, core needle biopsy:             - Prostatic adenocarcinoma, Faizan score 3 + 3 = 6, Prognostic Grade Group I, involving 5% of one core biopsy  L  Prostate, left medial apex, core needle biopsy:             - Prostatic adenocarcinoma, [place] 3 + 3 = 6, Prognostic Grade Group I, involving less than 5% of one core biopsy                     9/6/2019 Initial Diagnosis     Prostate cancer ([facility] [address] )      9/23/2019 -  Cancer Staged     Staging form: Prostate, AJCC 8th Edition  - Clinical: Stage IIB (cT1c, cN0, cM0, PSA: 4 2, Grade Group: 2) - Signed by Vin Shay MD on 9/23/2019  Prostate specific antigen (PSA) range: Less than 10  Histologic grading system: 5 grade system  Faizan score: 7        10/11/2019 -  Hormone Therapy     Firmagon on 10/11/19 and Lupron on 11/11/19         Past Medical History:   Diagnosis Date    Benign neoplasm of colon     Cancer ([facility] [address] )     Prostate CA    Colon, diverticulosis     ED (erectile dysfunction) of organic origin     Former tobacco use     Mixed hyperlipidemia     Obesity     Prediabetes      Past Surgical History:   Procedure Laterality Date    COLONOSCOPY  10/30/2014    COLONOSCOPY  2004    COLONOSCOPY      RADIOACTIVE PLAQUE INSERTION N/A 2019    Procedure: TISSUE MARKER INSERTION, SPACEOAR INSERTION;  Surgeon: Carlitos Leggett MD;  [facility];  Service: Urology    VENTRAL HERNIA REPAIR         Social History   Social History     Substance and Sexual Activity   Alcohol Use Yes    Frequency: Monthly or less    Drinks per session: 1 or 2    Binge frequency: Never    Comment: socially     Social History     Substance and Sexual Activity   Drug Use No     Social History     Tobacco Use   Smoking Status Former Smoker    Packs/day: 0 25    Years: 6 00    Pack years: 1 50    Types: Cigarettes    Last attempt to quit:     Years since quittin 9   Smokeless Tobacco Never Used       Meds/Allergies     Current Outpatient Medications:     Acetaminophen (TYLENOL) 325 MG CAPS, Take by mouth as needed , Disp: , Rfl:     Multiple Vitamin (MULTIVITAMINS PO), Take by mouth daily , Disp: , Rfl:     traMADol (ULTRAM) 50 mg tablet, Take 1 tablet (50 mg total) by mouth every 6 (six) hours as needed for moderate pain for up to 5 doses, Disp: 5 tablet, Rfl: 0  No Known Allergies    Review of Systems   Constitutional: Negative  HENT: Negative  Eyes: Negative  Respiratory: Negative  Gastrointestinal: Negative  Endocrine: Negative  Genitourinary: Positive for frequency (q 2 hours)  Negative for dysuria, hematuria and urgency  Nocturia x 2-3   Musculoskeletal: Negative  Allergic/Immunologic: Negative  Neurological: Negative  Hematological: Negative  Psychiatric/Behavioral: Negative  OBJECTIVE:   /80 (BP Location: Right arm, Patient Position: Sitting, Cuff Size: Standard)   Pulse 80   Resp 16   Ht 6' 3" (1 905 m)   Wt 102 kg (225 lb 8 oz)   BMI 28 19 kg/m²   Pain Assessment:  0  Karnofsky: 90 - Able to carry on normal activity; minor signs or symptoms of disease     Physical Exam   Constitutional: He is oriented to person, place, and time  He appears well-developed and well-nourished  No distress  Eyes: No scleral icterus  Cardiovascular: Normal rate and regular rhythm  No murmur heard  Pulmonary/Chest: Effort normal  He has no wheezes  He has no rales  Abdominal: Soft  He exhibits no distension  There is no tenderness  Musculoskeletal: He exhibits no edema  No CVA or spinal tenderness to percussion   Lymphadenopathy:     He has no cervical adenopathy  Right: No supraclavicular adenopathy present  Left: No supraclavicular adenopathy present  Neurological: He is alert and oriented to person, place, and time  Psychiatric: He has a normal mood and affect  His behavior is normal    Nursing note and vitals reviewed  Assessment/Plan:  Orders Placed This Encounter   Procedures   [address] [facility]        Dorota Ramirez is a 71y o  year old male with a history of unfavorable intermediate risk prostatic adenocarcinoma who presents today for CT simulation in preparation for definitive radiation in conjunction with short course androgen deprivation therapy  The rationale and potential benefits, as well as the risks and acute and late side effects and potential toxicities of radiation were discussed with the patient again briefly  He was given the opportunity to ask questions, which were answered to his satisfaction  He wished to proceed with the management plan  CT simulation today  Will plan to begin radiation soon thereafter            Manish Kim MD  11/25/2019,9:06 AM    Portions of the record may have been created with voice recognition software   Occasional wrong word or "sound a like" substitutions may have occurred due to the inherent limitations of voice recognition software   Read the chart carefully and recognize, using context, where substitutions have occurred

## 2019-11-26 ENCOUNTER — APPOINTMENT (OUTPATIENT)
Dept: LAB | Facility: CLINIC | Age: 69
End: 2019-11-26
Payer: COMMERCIAL

## 2019-11-26 ENCOUNTER — TELEPHONE (OUTPATIENT)
Dept: INTERNAL MEDICINE CLINIC | Facility: CLINIC | Age: 69
End: 2019-11-26

## 2019-11-26 ENCOUNTER — OFFICE VISIT (OUTPATIENT)
Dept: INTERNAL MEDICINE CLINIC | Facility: CLINIC | Age: 69
End: 2019-11-26
Payer: COMMERCIAL

## 2019-11-26 ENCOUNTER — DOCUMENTATION (OUTPATIENT)
Dept: INFUSION CENTER | Facility: CLINIC | Age: 69
End: 2019-11-26

## 2019-11-26 VITALS
WEIGHT: 223 LBS | BODY MASS INDEX: 27.73 KG/M2 | HEART RATE: 85 BPM | HEIGHT: 75 IN | SYSTOLIC BLOOD PRESSURE: 120 MMHG | OXYGEN SATURATION: 97 % | DIASTOLIC BLOOD PRESSURE: 80 MMHG

## 2019-11-26 DIAGNOSIS — E78.2 MIXED HYPERLIPIDEMIA: Chronic | ICD-10-CM

## 2019-11-26 DIAGNOSIS — R73.03 PREDIABETES: Chronic | ICD-10-CM

## 2019-11-26 DIAGNOSIS — C61 PROSTATE CANCER (HCC): Primary | ICD-10-CM

## 2019-11-26 PROBLEM — E66.9 OBESITY (BMI 30-39.9): Chronic | Status: RESOLVED | Noted: 2017-02-13 | Resolved: 2019-11-26

## 2019-11-26 PROBLEM — R97.20 ELEVATED PSA: Status: RESOLVED | Noted: 2019-07-17 | Resolved: 2019-11-26

## 2019-11-26 LAB
ANION GAP SERPL CALCULATED.3IONS-SCNC: 6 MMOL/L (ref 4–13)
BUN SERPL-MCNC: 25 MG/DL (ref 5–25)
CALCIUM SERPL-MCNC: 9.6 MG/DL (ref 8.3–10.1)
CHLORIDE SERPL-SCNC: 107 MMOL/L (ref 100–108)
CHOLEST SERPL-MCNC: 197 MG/DL (ref 50–200)
CO2 SERPL-SCNC: 25 MMOL/L (ref 21–32)
CREAT SERPL-MCNC: 1.19 MG/DL (ref 0.6–1.3)
EST. AVERAGE GLUCOSE BLD GHB EST-MCNC: 128 MG/DL
GFR SERPL CREATININE-BSD FRML MDRD: 72 ML/MIN/1.73SQ M
GLUCOSE P FAST SERPL-MCNC: 96 MG/DL (ref 65–99)
HBA1C MFR BLD: 6.1 % (ref 4.2–6.3)
HDLC SERPL-MCNC: 69 MG/DL
LDLC SERPL CALC-MCNC: 113 MG/DL (ref 0–100)
NONHDLC SERPL-MCNC: 128 MG/DL
POTASSIUM SERPL-SCNC: 4.1 MMOL/L (ref 3.5–5.3)
SODIUM SERPL-SCNC: 138 MMOL/L (ref 136–145)
TRIGL SERPL-MCNC: 74 MG/DL

## 2019-11-26 PROCEDURE — 36415 COLL VENOUS BLD VENIPUNCTURE: CPT

## 2019-11-26 PROCEDURE — 80061 LIPID PANEL: CPT

## 2019-11-26 PROCEDURE — 80048 BASIC METABOLIC PNL TOTAL CA: CPT

## 2019-11-26 PROCEDURE — 99214 OFFICE O/P EST MOD 30 MIN: CPT | Performed by: INTERNAL MEDICINE

## 2019-11-26 PROCEDURE — 83036 HEMOGLOBIN GLYCOSYLATED A1C: CPT

## 2019-11-26 RX ORDER — LATANOPROST 0.05 MG/ML
SOLUTION/ DROPS OPHTHALMIC; TOPICAL
Refills: 3 | COMMUNITY
Start: 2019-11-11

## 2019-11-26 NOTE — PATIENT INSTRUCTIONS
Prostate Cancer   AMBULATORY CARE:   Prostate cancer  develops in the male sex gland that helps make semen (prostate)  It is about the size of a walnut and wraps around the urethra  The urethra is the tube that carries urine from the bladder to the end of the penis  In most cases, prostate cancer is slow growing  Common symptoms include the following:   · Trouble starting or stopping the flow of urine    · Feeling the need to urinate often, especially at night    · Pain or a burning feeling when you urinate or ejaculate semen    · Trouble having an erection    · Blood in your urine or semen    · Not being able to urinate at all    · Pain or stiffness in your lower back, hips, or upper thighs  Call 911 for any of the following:   · Your leg feels warm, tender, and painful  It may look swollen and red  · You have chest pain when you take a deep breath or cough  · You suddenly feel lightheaded and short of breath  · You cough up blood  Contact your urologist or oncologist if:   · You have a fever  · You feel you cannot cope with your illness  · You have blood in your urine or have trouble urinating  · You have pain that does not decrease or go away after you take your medicine  · You have trouble having an erection  · You have questions or concerns about your condition or care  Treatment for prostate cancer: If you have early stage cancer, your healthcare provider may recommend that you have frequent tests and regular follow-up visits to watch for changes  You may also need any of the following:  · Hormone therapy  is medicine used to decrease testosterone (male hormone) levels  · Radiation therapy  is used to kill cancer cells with high-energy x-ray beams  You may receive radiation therapy from outside your body or from small beads or rods placed inside your prostate  · Surgery  may be needed, depending on the stage of the cancer  Part or all of your prostate may be removed   You may also need to have some lymph nodes taken out  This may help keep the cancer from spreading to other parts of your body  Manage your prostate cancer:   · Do not smoke  Nicotine can damage blood vessels and make it more difficult to manage your prostate cancer  Smoking also increases your risk for new or returning cancer and delays healing after treatment  Do not use e-cigarettes or smokeless tobacco in place of cigarettes or to help you quit  They still contain nicotine  Ask your healthcare provider for information if you currently smoke and need help quitting  · Limit or do not drink alcohol as directed  Limit alcohol to 2 drinks per day  A drink is 12 ounces of beer, 1½ ounces of liquor, or 5 ounces of wine  · Eat a variety of healthy foods  Healthy foods include fruits, vegetables, whole-grain breads, low-fat dairy products, beans, lean meats, and fish  Your healthcare provider may also recommend changes to the amounts of calcium and vitamin D you have each day  · Manage your weight  Obesity may increase your risk for problems from prostate cancer  Limit or do not have high-calorie foods or drinks  · Exercise as directed  Exercise may help you recover after treatment and may help prevent your prostate cancer from returning  Exercise can also help you manage your weight  Try to get at least 30 minutes of exercise 5 days a week, such as walking  · Ask about sexual activity  Ask your healthcare provider when it is safe for you to start having sex after your treatment  Medicines may be given if you have trouble getting or maintaining an erection  · Manage incontinence  You may have incontinence (trouble controlling when you urinate) after treatment  Ask your healthcare provider for information on managing urinary incontinence  You may be able to gain control over your urination with techniques or medicines  · Drink liquids as directed    Ask how much liquid to drink each day and which liquids are best for you  Drink extra liquids to prevent dehydration  You will also need to replace fluid if you are vomiting or have diarrhea from cancer treatments  Follow up with your urologist or oncologist as directed:  Write down your questions so you remember to ask them during your visits  © 2017 [address] [employer] Information is for End User's use only and may not be sold, redistributed or otherwise used for commercial purposes  All illustrations and images included in CareNotes® are the copyrighted property of [employer] , [employer]  or [employer]  The above information is an  only  It is not intended as medical advice for individual conditions or treatments  Talk to your doctor, nurse or pharmacist before following any medical regimen to see if it is safe and effective for you

## 2019-11-26 NOTE — PROGRESS NOTES
INTERNAL MEDICINE FOLLOW-UP OFFICE VISIT  [facility] - [facility] [address] [place] [address]    NAME: Arely Maier  AGE: 71 y o  SEX: male  : 1950     DATE: 2019     Assessment and Plan:     1  Prostate cancer [facility])  Assessment & Plan:  Patient has intermediate risk prostate cancer  He has elected for androgen deprivation therapy and radiation therapy  Continue follow-up with radiation oncology and urology  2  Mixed hyperlipidemia  Assessment & Plan:  Patient's cholesterol has improved over the years with diet alone  Will repeat lipid panel and monitor off medications  No history of ASCVD  Orders:  -     Lipid panel; Future  -     Basic metabolic panel; Future    3  Prediabetes  Assessment & Plan:  Most recent A1c was 6 2 % on 2019  Will check UTD A1C  Avoid excess carbohydrates  Orders:  -     Hemoglobin A1C; Future    Return in about 6 months (around 2020) for Follow-up  Chief Complaint:     Chief Complaint   Patient presents with    Follow-up     6 month        History of Present Illness:     Patient presents for follow-up  Previously found to have an elevated PSA of 4 2 on 2019  He was referred to urology and underwent biopsy on 2019 which showed Prostatic adenocarcinoma (Faizan score 3+4 = 7)  He has clinical stage T1c prostate cancer and his clinical presentation is consistent with unfavorable intermediate risk prostatic adenocarcinoma  He recently saw radiation oncology for radiation planning  He had a [place] injection with Dr Elaine Young on 10/11/19 and then Lupron on 19  His only symptom he has noted is hot flashes  Prediabetes: Most recent A1c was 6 2 % on 2019  He admits that he could eat a little better  Doesn't exercise  HLD: cholesterol has been controlled most recently without medications  No history of stroke or CAD       Review of Systems:     Review of Systems   Constitutional: Negative for activity change, appetite change and fatigue  Respiratory: Negative for apnea, cough, chest tightness, shortness of breath and wheezing  Cardiovascular: Negative for chest pain, palpitations and leg swelling  Gastrointestinal: Negative for abdominal distention, abdominal pain, blood in stool, constipation, diarrhea, nausea and vomiting  Musculoskeletal: Negative for arthralgias, back pain, gait problem, joint swelling and myalgias  Skin: Negative for rash and wound  Neurological: Negative for dizziness, weakness, light-headedness, numbness and headaches  Psychiatric/Behavioral: Negative for behavioral problems, confusion, hallucinations, sleep disturbance and suicidal ideas  The patient is not nervous/anxious  Problem List:     Patient Active Problem List   Diagnosis    Mixed hyperlipidemia    Obesity (BMI 30-39  9)    Prediabetes    Prostate cancer ([facility] [address] )    Encounter for monitoring androgen deprivation therapy      Objective:     /80 (BP Location: Left arm, Patient Position: Sitting, Cuff Size: Standard)   Pulse 85   Ht 6' 3" (1 905 m)   Wt 101 kg (223 lb)   SpO2 97%   BMI 27 87 kg/m²     Physical Exam   Constitutional: He is oriented to person, place, and time  He appears well-developed and well-nourished  No distress  Eyes: Conjunctivae are normal  Right eye exhibits no discharge  Left eye exhibits no discharge  No scleral icterus  Neck: Neck supple  No JVD present  No thyromegaly present  Cardiovascular: Normal rate, regular rhythm and normal heart sounds  No murmur heard  Pulmonary/Chest: Effort normal and breath sounds normal  No respiratory distress  He has no wheezes  He has no rales  He exhibits no tenderness  Abdominal: Soft  Bowel sounds are normal  He exhibits no distension and no mass  There is no tenderness  There is no rebound and no guarding  No hernia  Musculoskeletal: He exhibits no edema  Lymphadenopathy:     He has no cervical adenopathy     Neurological: He is alert and oriented to person, place, and time  Skin: Skin is warm and dry  He is not diaphoretic  Psychiatric: He has a normal mood and affect  His behavior is normal    Vitals reviewed      Loy Rosario DO  [facility]

## 2019-11-26 NOTE — SOCIAL WORK
MSW reviewed pt's DT, completed in Rad onc, which he self scored as a 5  Pt indicated concerns with family health issues as well as sexual concerns, however he answered all other concerns as "no"  MSW called pt today to follow up  He says that he is feeling good and doesn't have any concerns at this time  He says "everything is fine, I'm doing just fine"  He shared with me how his appointment went yesterday and that he had his SIM done  He is aware that he has his next appt with Rad Onc on 12/6 at 2:00  We reviewed that he would come in on that day for a test run, and then would start treatment the Monday after  He was appreciative of the call, but denies any needs or concerns at this point  MSW will meet pt in person when he starts treatment to introduce myself in person  No other concerns at this time

## 2019-11-26 NOTE — ASSESSMENT & PLAN NOTE
Patient's cholesterol has improved over the years with diet alone  Will repeat lipid panel and monitor off medications  No history of ASCVD

## 2019-11-26 NOTE — TELEPHONE ENCOUNTER
----- Message from Genoveva Dang DO sent at 11/26/2019 11:01 AM EST -----  Call patient and let him know lab work is stable

## 2019-11-26 NOTE — TELEPHONE ENCOUNTER
"automated message says that calls are being screened & they arent accepting this call because it it blocked"

## 2019-11-26 NOTE — ASSESSMENT & PLAN NOTE
Patient has intermediate risk prostate cancer  He has elected for androgen deprivation therapy and radiation therapy  Continue follow-up with radiation oncology and urology

## 2019-12-02 ENCOUNTER — APPOINTMENT (OUTPATIENT)
Dept: RADIATION ONCOLOGY | Facility: CLINIC | Age: 69
End: 2019-12-02
Attending: RADIOLOGY
Payer: COMMERCIAL

## 2019-12-05 PROCEDURE — 77338 DESIGN MLC DEVICE FOR IMRT: CPT | Performed by: RADIOLOGY

## 2019-12-05 PROCEDURE — 77300 RADIATION THERAPY DOSE PLAN: CPT | Performed by: RADIOLOGY

## 2019-12-05 PROCEDURE — 77301 RADIOTHERAPY DOSE PLAN IMRT: CPT | Performed by: RADIOLOGY

## 2019-12-06 ENCOUNTER — APPOINTMENT (OUTPATIENT)
Dept: RADIATION ONCOLOGY | Facility: CLINIC | Age: 69
End: 2019-12-06
Attending: RADIOLOGY
Payer: COMMERCIAL

## 2019-12-09 ENCOUNTER — APPOINTMENT (OUTPATIENT)
Dept: RADIATION ONCOLOGY | Facility: CLINIC | Age: 69
End: 2019-12-09
Attending: RADIOLOGY
Payer: COMMERCIAL

## 2019-12-09 PROCEDURE — 77417 THER RADIOLOGY PORT IMAGE(S): CPT | Performed by: RADIOLOGY

## 2019-12-09 PROCEDURE — 77385 HB NTSTY MODUL RAD TX DLVR SMPL: CPT | Performed by: RADIOLOGY

## 2019-12-10 ENCOUNTER — APPOINTMENT (OUTPATIENT)
Dept: RADIATION ONCOLOGY | Facility: CLINIC | Age: 69
End: 2019-12-10
Attending: RADIOLOGY
Payer: COMMERCIAL

## 2019-12-10 PROCEDURE — 77385 HB NTSTY MODUL RAD TX DLVR SMPL: CPT | Performed by: RADIOLOGY

## 2019-12-11 ENCOUNTER — APPOINTMENT (OUTPATIENT)
Dept: RADIATION ONCOLOGY | Facility: CLINIC | Age: 69
End: 2019-12-11
Attending: RADIOLOGY
Payer: COMMERCIAL

## 2019-12-11 DIAGNOSIS — C61 PROSTATE CANCER (HCC): Primary | ICD-10-CM

## 2019-12-11 PROCEDURE — 77385 HB NTSTY MODUL RAD TX DLVR SMPL: CPT | Performed by: RADIOLOGY

## 2019-12-12 ENCOUNTER — APPOINTMENT (OUTPATIENT)
Dept: RADIATION ONCOLOGY | Facility: CLINIC | Age: 69
End: 2019-12-12
Attending: RADIOLOGY
Payer: COMMERCIAL

## 2019-12-12 PROCEDURE — 77385 HB NTSTY MODUL RAD TX DLVR SMPL: CPT | Performed by: RADIOLOGY

## 2019-12-13 ENCOUNTER — APPOINTMENT (OUTPATIENT)
Dept: RADIATION ONCOLOGY | Facility: CLINIC | Age: 69
End: 2019-12-13
Attending: RADIOLOGY
Payer: COMMERCIAL

## 2019-12-13 PROCEDURE — 77336 RADIATION PHYSICS CONSULT: CPT | Performed by: RADIOLOGY

## 2019-12-13 PROCEDURE — 77385 HB NTSTY MODUL RAD TX DLVR SMPL: CPT | Performed by: RADIOLOGY

## 2019-12-16 ENCOUNTER — APPOINTMENT (OUTPATIENT)
Dept: RADIATION ONCOLOGY | Facility: CLINIC | Age: 69
End: 2019-12-16
Attending: RADIOLOGY
Payer: COMMERCIAL

## 2019-12-16 PROCEDURE — 77385 HB NTSTY MODUL RAD TX DLVR SMPL: CPT | Performed by: RADIOLOGY

## 2019-12-17 ENCOUNTER — APPOINTMENT (OUTPATIENT)
Dept: RADIATION ONCOLOGY | Facility: CLINIC | Age: 69
End: 2019-12-17
Attending: RADIOLOGY
Payer: COMMERCIAL

## 2019-12-17 PROCEDURE — 77385 HB NTSTY MODUL RAD TX DLVR SMPL: CPT | Performed by: RADIOLOGY

## 2019-12-18 ENCOUNTER — APPOINTMENT (OUTPATIENT)
Dept: RADIATION ONCOLOGY | Facility: CLINIC | Age: 69
End: 2019-12-18
Attending: RADIOLOGY
Payer: COMMERCIAL

## 2019-12-18 PROCEDURE — 77385 HB NTSTY MODUL RAD TX DLVR SMPL: CPT | Performed by: RADIOLOGY

## 2019-12-19 ENCOUNTER — APPOINTMENT (OUTPATIENT)
Dept: RADIATION ONCOLOGY | Facility: CLINIC | Age: 69
End: 2019-12-19
Attending: RADIOLOGY
Payer: COMMERCIAL

## 2019-12-19 ENCOUNTER — APPOINTMENT (OUTPATIENT)
Dept: LAB | Facility: HOSPITAL | Age: 69
End: 2019-12-19
Attending: RADIOLOGY
Payer: COMMERCIAL

## 2019-12-19 DIAGNOSIS — C61 PROSTATE CANCER (HCC): ICD-10-CM

## 2019-12-19 LAB
BASOPHILS # BLD AUTO: 0.04 THOUSANDS/ΜL (ref 0–0.1)
BASOPHILS NFR BLD AUTO: 1 % (ref 0–1)
EOSINOPHIL # BLD AUTO: 0.44 THOUSAND/ΜL (ref 0–0.61)
EOSINOPHIL NFR BLD AUTO: 10 % (ref 0–6)
ERYTHROCYTE [DISTWIDTH] IN BLOOD BY AUTOMATED COUNT: 14.6 % (ref 11.6–15.1)
HCT VFR BLD AUTO: 44.6 % (ref 36.5–49.3)
HGB BLD-MCNC: 14 G/DL (ref 12–17)
IMM GRANULOCYTES # BLD AUTO: 0.02 THOUSAND/UL (ref 0–0.2)
IMM GRANULOCYTES NFR BLD AUTO: 1 % (ref 0–2)
LYMPHOCYTES # BLD AUTO: 1.56 THOUSANDS/ΜL (ref 0.6–4.47)
LYMPHOCYTES NFR BLD AUTO: 35 % (ref 14–44)
MCH RBC QN AUTO: 26.9 PG (ref 26.8–34.3)
MCHC RBC AUTO-ENTMCNC: 31.4 G/DL (ref 31.4–37.4)
MCV RBC AUTO: 86 FL (ref 82–98)
MONOCYTES # BLD AUTO: 0.47 THOUSAND/ΜL (ref 0.17–1.22)
MONOCYTES NFR BLD AUTO: 11 % (ref 4–12)
NEUTROPHILS # BLD AUTO: 1.9 THOUSANDS/ΜL (ref 1.85–7.62)
NEUTS SEG NFR BLD AUTO: 42 % (ref 43–75)
NRBC BLD AUTO-RTO: 0 /100 WBCS
PLATELET # BLD AUTO: 251 THOUSANDS/UL (ref 149–390)
PMV BLD AUTO: 9.3 FL (ref 8.9–12.7)
RBC # BLD AUTO: 5.21 MILLION/UL (ref 3.88–5.62)
WBC # BLD AUTO: 4.43 THOUSAND/UL (ref 4.31–10.16)

## 2019-12-19 PROCEDURE — 77385 HB NTSTY MODUL RAD TX DLVR SMPL: CPT | Performed by: RADIOLOGY

## 2019-12-19 PROCEDURE — 85025 COMPLETE CBC W/AUTO DIFF WBC: CPT

## 2019-12-19 PROCEDURE — 36415 COLL VENOUS BLD VENIPUNCTURE: CPT

## 2019-12-20 ENCOUNTER — APPOINTMENT (OUTPATIENT)
Dept: RADIATION ONCOLOGY | Facility: CLINIC | Age: 69
End: 2019-12-20
Attending: RADIOLOGY
Payer: COMMERCIAL

## 2019-12-20 PROCEDURE — 77336 RADIATION PHYSICS CONSULT: CPT | Performed by: RADIOLOGY

## 2019-12-20 PROCEDURE — 77385 HB NTSTY MODUL RAD TX DLVR SMPL: CPT | Performed by: RADIOLOGY

## 2019-12-23 ENCOUNTER — APPOINTMENT (OUTPATIENT)
Dept: RADIATION ONCOLOGY | Facility: CLINIC | Age: 69
End: 2019-12-23
Attending: RADIOLOGY
Payer: COMMERCIAL

## 2019-12-23 DIAGNOSIS — C61 PROSTATE CANCER (HCC): Primary | ICD-10-CM

## 2019-12-23 PROCEDURE — 77385 HB NTSTY MODUL RAD TX DLVR SMPL: CPT | Performed by: RADIOLOGY

## 2019-12-24 ENCOUNTER — APPOINTMENT (OUTPATIENT)
Dept: LAB | Facility: HOSPITAL | Age: 69
End: 2019-12-24
Attending: RADIOLOGY
Payer: COMMERCIAL

## 2019-12-24 ENCOUNTER — APPOINTMENT (OUTPATIENT)
Dept: RADIATION ONCOLOGY | Facility: CLINIC | Age: 69
End: 2019-12-24
Attending: RADIOLOGY
Payer: COMMERCIAL

## 2019-12-24 LAB
BILIRUB UR QL STRIP: NEGATIVE
CLARITY UR: CLEAR
COLOR UR: YELLOW
GLUCOSE UR STRIP-MCNC: NEGATIVE MG/DL
HGB UR QL STRIP.AUTO: NEGATIVE
KETONES UR STRIP-MCNC: NEGATIVE MG/DL
LEUKOCYTE ESTERASE UR QL STRIP: NEGATIVE
NITRITE UR QL STRIP: NEGATIVE
PH UR STRIP.AUTO: 6 [PH]
PROT UR STRIP-MCNC: NEGATIVE MG/DL
SP GR UR STRIP.AUTO: 1.02 (ref 1–1.03)
UROBILINOGEN UR QL STRIP.AUTO: 0.2 E.U./DL

## 2019-12-24 PROCEDURE — 77385 HB NTSTY MODUL RAD TX DLVR SMPL: CPT | Performed by: RADIOLOGY

## 2019-12-24 PROCEDURE — 81003 URINALYSIS AUTO W/O SCOPE: CPT | Performed by: RADIOLOGY

## 2019-12-26 ENCOUNTER — APPOINTMENT (OUTPATIENT)
Dept: RADIATION ONCOLOGY | Facility: CLINIC | Age: 69
End: 2019-12-26
Attending: RADIOLOGY
Payer: COMMERCIAL

## 2019-12-26 PROCEDURE — 77385 HB NTSTY MODUL RAD TX DLVR SMPL: CPT | Performed by: RADIOLOGY

## 2019-12-27 ENCOUNTER — APPOINTMENT (OUTPATIENT)
Dept: RADIATION ONCOLOGY | Facility: CLINIC | Age: 69
End: 2019-12-27
Attending: RADIOLOGY
Payer: COMMERCIAL

## 2019-12-27 PROCEDURE — 77385 HB NTSTY MODUL RAD TX DLVR SMPL: CPT | Performed by: RADIOLOGY

## 2019-12-30 ENCOUNTER — APPOINTMENT (OUTPATIENT)
Dept: RADIATION ONCOLOGY | Facility: CLINIC | Age: 69
End: 2019-12-30
Attending: RADIOLOGY
Payer: COMMERCIAL

## 2019-12-30 PROCEDURE — 77385 HB NTSTY MODUL RAD TX DLVR SMPL: CPT | Performed by: RADIOLOGY

## 2019-12-30 PROCEDURE — 77336 RADIATION PHYSICS CONSULT: CPT | Performed by: RADIOLOGY

## 2019-12-31 ENCOUNTER — APPOINTMENT (OUTPATIENT)
Dept: RADIATION ONCOLOGY | Facility: CLINIC | Age: 69
End: 2019-12-31
Attending: RADIOLOGY
Payer: COMMERCIAL

## 2019-12-31 PROCEDURE — 77385 HB NTSTY MODUL RAD TX DLVR SMPL: CPT | Performed by: RADIOLOGY

## 2020-01-02 ENCOUNTER — APPOINTMENT (OUTPATIENT)
Dept: RADIATION ONCOLOGY | Facility: CLINIC | Age: 70
End: 2020-01-02
Attending: RADIOLOGY
Payer: COMMERCIAL

## 2020-01-02 PROCEDURE — 77385 HB NTSTY MODUL RAD TX DLVR SMPL: CPT | Performed by: RADIOLOGY

## 2020-01-03 ENCOUNTER — APPOINTMENT (OUTPATIENT)
Dept: RADIATION ONCOLOGY | Facility: CLINIC | Age: 70
End: 2020-01-03
Attending: RADIOLOGY
Payer: COMMERCIAL

## 2020-01-03 PROCEDURE — 77385 HB NTSTY MODUL RAD TX DLVR SMPL: CPT | Performed by: RADIOLOGY

## 2020-01-06 ENCOUNTER — APPOINTMENT (OUTPATIENT)
Dept: RADIATION ONCOLOGY | Facility: CLINIC | Age: 70
End: 2020-01-06
Attending: RADIOLOGY
Payer: COMMERCIAL

## 2020-01-06 PROCEDURE — 77385 HB NTSTY MODUL RAD TX DLVR SMPL: CPT | Performed by: RADIOLOGY

## 2020-01-07 ENCOUNTER — APPOINTMENT (OUTPATIENT)
Dept: RADIATION ONCOLOGY | Facility: CLINIC | Age: 70
End: 2020-01-07
Attending: RADIOLOGY
Payer: COMMERCIAL

## 2020-01-07 PROCEDURE — 77385 HB NTSTY MODUL RAD TX DLVR SMPL: CPT | Performed by: RADIOLOGY

## 2020-01-07 PROCEDURE — 77336 RADIATION PHYSICS CONSULT: CPT | Performed by: RADIOLOGY

## 2020-01-08 ENCOUNTER — APPOINTMENT (OUTPATIENT)
Dept: RADIATION ONCOLOGY | Facility: CLINIC | Age: 70
End: 2020-01-08
Attending: RADIOLOGY
Payer: COMMERCIAL

## 2020-01-08 DIAGNOSIS — C61 PROSTATE CANCER (HCC): Primary | ICD-10-CM

## 2020-01-08 PROCEDURE — 77385 HB NTSTY MODUL RAD TX DLVR SMPL: CPT | Performed by: RADIOLOGY

## 2020-01-08 RX ORDER — TAMSULOSIN HYDROCHLORIDE 0.4 MG/1
0.4 CAPSULE ORAL
Qty: 20 CAPSULE | Refills: 0 | Status: SHIPPED | OUTPATIENT
Start: 2020-01-08 | End: 2020-01-27

## 2020-01-09 ENCOUNTER — APPOINTMENT (OUTPATIENT)
Dept: RADIATION ONCOLOGY | Facility: CLINIC | Age: 70
End: 2020-01-09
Attending: RADIOLOGY
Payer: COMMERCIAL

## 2020-01-09 PROCEDURE — 77385 HB NTSTY MODUL RAD TX DLVR SMPL: CPT | Performed by: RADIOLOGY

## 2020-01-10 ENCOUNTER — APPOINTMENT (OUTPATIENT)
Dept: RADIATION ONCOLOGY | Facility: CLINIC | Age: 70
End: 2020-01-10
Attending: RADIOLOGY
Payer: COMMERCIAL

## 2020-01-10 PROCEDURE — 77385 HB NTSTY MODUL RAD TX DLVR SMPL: CPT | Performed by: RADIOLOGY

## 2020-01-13 ENCOUNTER — APPOINTMENT (OUTPATIENT)
Dept: RADIATION ONCOLOGY | Facility: CLINIC | Age: 70
End: 2020-01-13
Attending: RADIOLOGY
Payer: COMMERCIAL

## 2020-01-13 PROCEDURE — 77385 HB NTSTY MODUL RAD TX DLVR SMPL: CPT | Performed by: RADIOLOGY

## 2020-01-14 ENCOUNTER — APPOINTMENT (OUTPATIENT)
Dept: RADIATION ONCOLOGY | Facility: CLINIC | Age: 70
End: 2020-01-14
Attending: RADIOLOGY
Payer: COMMERCIAL

## 2020-01-14 PROCEDURE — 77336 RADIATION PHYSICS CONSULT: CPT | Performed by: RADIOLOGY

## 2020-01-14 PROCEDURE — 77385 HB NTSTY MODUL RAD TX DLVR SMPL: CPT | Performed by: RADIOLOGY

## 2020-01-15 ENCOUNTER — APPOINTMENT (OUTPATIENT)
Dept: RADIATION ONCOLOGY | Facility: CLINIC | Age: 70
End: 2020-01-15
Attending: RADIOLOGY
Payer: COMMERCIAL

## 2020-01-15 PROCEDURE — 77385 HB NTSTY MODUL RAD TX DLVR SMPL: CPT | Performed by: RADIOLOGY

## 2020-01-16 ENCOUNTER — APPOINTMENT (OUTPATIENT)
Dept: RADIATION ONCOLOGY | Facility: CLINIC | Age: 70
End: 2020-01-16
Attending: RADIOLOGY
Payer: COMMERCIAL

## 2020-01-17 ENCOUNTER — APPOINTMENT (OUTPATIENT)
Dept: RADIATION ONCOLOGY | Facility: CLINIC | Age: 70
End: 2020-01-17
Attending: RADIOLOGY
Payer: COMMERCIAL

## 2020-01-17 PROCEDURE — 77385 HB NTSTY MODUL RAD TX DLVR SMPL: CPT | Performed by: RADIOLOGY

## 2020-01-20 ENCOUNTER — APPOINTMENT (OUTPATIENT)
Dept: RADIATION ONCOLOGY | Facility: CLINIC | Age: 70
End: 2020-01-20
Attending: RADIOLOGY
Payer: COMMERCIAL

## 2020-01-20 PROCEDURE — 77385 HB NTSTY MODUL RAD TX DLVR SMPL: CPT | Performed by: RADIOLOGY

## 2020-01-21 ENCOUNTER — APPOINTMENT (OUTPATIENT)
Dept: RADIATION ONCOLOGY | Facility: CLINIC | Age: 70
End: 2020-01-21
Attending: RADIOLOGY
Payer: COMMERCIAL

## 2020-01-21 PROCEDURE — 77385 HB NTSTY MODUL RAD TX DLVR SMPL: CPT | Performed by: RADIOLOGY

## 2020-01-22 ENCOUNTER — APPOINTMENT (OUTPATIENT)
Dept: RADIATION ONCOLOGY | Facility: CLINIC | Age: 70
End: 2020-01-22
Attending: RADIOLOGY
Payer: COMMERCIAL

## 2020-01-22 ENCOUNTER — COMPLETE SKIN EXAM (OUTPATIENT)
Dept: URBAN - METROPOLITAN AREA CLINIC 19 | Facility: CLINIC | Age: 70
Setting detail: DERMATOLOGY
End: 2020-01-22

## 2020-01-22 DIAGNOSIS — D22.5 MELANOCYTIC NEVI OF TRUNK: ICD-10-CM

## 2020-01-22 DIAGNOSIS — L57.8 OTHER SKIN CHANGES DUE TO CHRONIC EXPOSURE TO NONIONIZING RADIATION: ICD-10-CM

## 2020-01-22 DIAGNOSIS — L81.4 OTHER MELANIN HYPERPIGMENTATION: ICD-10-CM

## 2020-01-22 DIAGNOSIS — L82.1 OTHER SEBORRHEIC KERATOSIS: ICD-10-CM

## 2020-01-22 PROBLEM — D18.01 HEMANGIOMA OF SKIN AND SUBCUTANEOUS TISSUE: Status: RESOLVED | Noted: 2020-01-22

## 2020-01-22 PROBLEM — L82.0 INFLAMED SEBORRHEIC KERATOSIS: Status: RESOLVED | Noted: 2020-01-22

## 2020-01-22 PROCEDURE — 77385 HB NTSTY MODUL RAD TX DLVR SMPL: CPT | Performed by: RADIOLOGY

## 2020-01-22 PROCEDURE — 77336 RADIATION PHYSICS CONSULT: CPT | Performed by: RADIOLOGY

## 2020-01-22 PROCEDURE — 17110 DESTRUCT B9 LESION 1-14: CPT

## 2020-01-22 PROCEDURE — 99213 OFFICE O/P EST LOW 20 MIN: CPT

## 2020-01-23 ENCOUNTER — APPOINTMENT (OUTPATIENT)
Dept: RADIATION ONCOLOGY | Facility: CLINIC | Age: 70
End: 2020-01-23
Attending: RADIOLOGY
Payer: COMMERCIAL

## 2020-01-23 PROCEDURE — 77385 HB NTSTY MODUL RAD TX DLVR SMPL: CPT | Performed by: RADIOLOGY

## 2020-01-24 ENCOUNTER — APPOINTMENT (OUTPATIENT)
Dept: RADIATION ONCOLOGY | Facility: CLINIC | Age: 70
End: 2020-01-24
Attending: RADIOLOGY
Payer: COMMERCIAL

## 2020-01-24 PROCEDURE — 77385 HB NTSTY MODUL RAD TX DLVR SMPL: CPT | Performed by: RADIOLOGY

## 2020-01-25 DIAGNOSIS — C61 PROSTATE CANCER (HCC): ICD-10-CM

## 2020-01-27 ENCOUNTER — APPOINTMENT (OUTPATIENT)
Dept: RADIATION ONCOLOGY | Facility: CLINIC | Age: 70
End: 2020-01-27
Attending: RADIOLOGY
Payer: COMMERCIAL

## 2020-01-27 PROCEDURE — 77385 HB NTSTY MODUL RAD TX DLVR SMPL: CPT | Performed by: RADIOLOGY

## 2020-01-27 RX ORDER — TAMSULOSIN HYDROCHLORIDE 0.4 MG/1
0.4 CAPSULE ORAL EVERY OTHER DAY
Qty: 30 CAPSULE | Refills: 0 | Status: SHIPPED | OUTPATIENT
Start: 2020-01-27 | End: 2020-12-11 | Stop reason: ALTCHOICE

## 2020-01-28 ENCOUNTER — APPOINTMENT (OUTPATIENT)
Dept: RADIATION ONCOLOGY | Facility: CLINIC | Age: 70
End: 2020-01-28
Attending: RADIOLOGY
Payer: COMMERCIAL

## 2020-01-28 PROCEDURE — 77385 HB NTSTY MODUL RAD TX DLVR SMPL: CPT | Performed by: RADIOLOGY

## 2020-01-29 ENCOUNTER — APPOINTMENT (OUTPATIENT)
Dept: RADIATION ONCOLOGY | Facility: CLINIC | Age: 70
End: 2020-01-29
Attending: RADIOLOGY
Payer: COMMERCIAL

## 2020-01-29 PROCEDURE — 77385 HB NTSTY MODUL RAD TX DLVR SMPL: CPT | Performed by: RADIOLOGY

## 2020-01-29 PROCEDURE — 77336 RADIATION PHYSICS CONSULT: CPT | Performed by: RADIOLOGY

## 2020-01-30 ENCOUNTER — APPOINTMENT (OUTPATIENT)
Dept: RADIATION ONCOLOGY | Facility: CLINIC | Age: 70
End: 2020-01-30
Attending: RADIOLOGY
Payer: COMMERCIAL

## 2020-01-30 PROCEDURE — 77385 HB NTSTY MODUL RAD TX DLVR SMPL: CPT | Performed by: RADIOLOGY

## 2020-01-31 ENCOUNTER — APPOINTMENT (OUTPATIENT)
Dept: RADIATION ONCOLOGY | Facility: CLINIC | Age: 70
End: 2020-01-31
Attending: RADIOLOGY
Payer: COMMERCIAL

## 2020-01-31 PROCEDURE — 77385 HB NTSTY MODUL RAD TX DLVR SMPL: CPT | Performed by: RADIOLOGY

## 2020-02-03 ENCOUNTER — APPOINTMENT (OUTPATIENT)
Dept: RADIATION ONCOLOGY | Facility: CLINIC | Age: 70
End: 2020-02-03
Attending: RADIOLOGY
Payer: COMMERCIAL

## 2020-02-03 PROCEDURE — 77385 HB NTSTY MODUL RAD TX DLVR SMPL: CPT | Performed by: RADIOLOGY

## 2020-02-04 ENCOUNTER — APPOINTMENT (OUTPATIENT)
Dept: RADIATION ONCOLOGY | Facility: CLINIC | Age: 70
End: 2020-02-04
Attending: RADIOLOGY
Payer: COMMERCIAL

## 2020-02-04 PROCEDURE — 77385 HB NTSTY MODUL RAD TX DLVR SMPL: CPT | Performed by: RADIOLOGY

## 2020-02-05 ENCOUNTER — APPOINTMENT (OUTPATIENT)
Dept: RADIATION ONCOLOGY | Facility: CLINIC | Age: 70
End: 2020-02-05
Attending: RADIOLOGY
Payer: COMMERCIAL

## 2020-02-05 PROCEDURE — 77336 RADIATION PHYSICS CONSULT: CPT | Performed by: RADIOLOGY

## 2020-02-05 PROCEDURE — 77385 HB NTSTY MODUL RAD TX DLVR SMPL: CPT | Performed by: RADIOLOGY

## 2020-02-06 ENCOUNTER — APPOINTMENT (OUTPATIENT)
Dept: RADIATION ONCOLOGY | Facility: CLINIC | Age: 70
End: 2020-02-06
Attending: RADIOLOGY
Payer: COMMERCIAL

## 2020-02-06 PROCEDURE — 77385 HB NTSTY MODUL RAD TX DLVR SMPL: CPT | Performed by: RADIOLOGY

## 2020-02-07 ENCOUNTER — APPOINTMENT (OUTPATIENT)
Dept: RADIATION ONCOLOGY | Facility: CLINIC | Age: 70
End: 2020-02-07
Attending: RADIOLOGY
Payer: COMMERCIAL

## 2020-02-07 PROCEDURE — 77385 HB NTSTY MODUL RAD TX DLVR SMPL: CPT | Performed by: RADIOLOGY

## 2020-02-10 ENCOUNTER — APPOINTMENT (OUTPATIENT)
Dept: RADIATION ONCOLOGY | Facility: CLINIC | Age: 70
End: 2020-02-10
Attending: RADIOLOGY
Payer: COMMERCIAL

## 2020-02-10 PROCEDURE — 77385 HB NTSTY MODUL RAD TX DLVR SMPL: CPT | Performed by: RADIOLOGY

## 2020-02-11 ENCOUNTER — APPOINTMENT (OUTPATIENT)
Dept: RADIATION ONCOLOGY | Facility: CLINIC | Age: 70
End: 2020-02-11
Attending: RADIOLOGY
Payer: COMMERCIAL

## 2020-02-11 PROCEDURE — 77385 HB NTSTY MODUL RAD TX DLVR SMPL: CPT | Performed by: RADIOLOGY

## 2020-03-05 ENCOUNTER — OFFICE VISIT (OUTPATIENT)
Dept: UROLOGY | Facility: CLINIC | Age: 70
End: 2020-03-05

## 2020-03-05 VITALS
DIASTOLIC BLOOD PRESSURE: 70 MMHG | WEIGHT: 237.2 LBS | BODY MASS INDEX: 29.49 KG/M2 | SYSTOLIC BLOOD PRESSURE: 130 MMHG | HEIGHT: 75 IN | HEART RATE: 86 BPM

## 2020-03-05 DIAGNOSIS — Z79.818 ENCOUNTER FOR MONITORING ANDROGEN DEPRIVATION THERAPY: ICD-10-CM

## 2020-03-05 DIAGNOSIS — C61 PROSTATE CANCER (HCC): Primary | ICD-10-CM

## 2020-03-05 PROCEDURE — 1036F TOBACCO NON-USER: CPT | Performed by: UROLOGY

## 2020-03-05 PROCEDURE — 99213 OFFICE O/P EST LOW 20 MIN: CPT | Performed by: UROLOGY

## 2020-03-05 PROCEDURE — 4040F PNEUMOC VAC/ADMIN/RCVD: CPT | Performed by: UROLOGY

## 2020-03-05 PROCEDURE — 1160F RVW MEDS BY RX/DR IN RCRD: CPT | Performed by: UROLOGY

## 2020-03-05 PROCEDURE — 3008F BODY MASS INDEX DOCD: CPT | Performed by: UROLOGY

## 2020-03-05 NOTE — PROGRESS NOTES
Problem List Items Addressed This Visit        Genitourinary    Prostate cancer [facility]) - Primary    Relevant Orders    PSA Total, Diagnostic       Other    Encounter for monitoring androgen deprivation therapy            Discussion:  I had a nice visit with Mary Heaton and with his wife today  His ECOG performance status is 0, he has tolerated his radiation with essentially no side effects    He is accomplishing all activities of daily living, his voiding is unchanged, he still has nocturia 3 times per night, he has gained some weight on androgen deprivation therapy, he has also had some hot flashes  We will not administer further Lupron as per the treatment plan, we will check a PSA in 3 months, he will see us at that time  We do expect his PSA was increased somewhat after his androgen deprivation therapy has worn off, this will not be concerning, we will look for continued stabilization of his elkin value of PSA  If doing well in 3 months, he will see us on an every 6 month basis for the next year or 2, to be followed by yearly visits ongoing  Assessment and plan:       Please see problem oriented charting for the assessment plan of today's urological complaints  -     Isidro Billingsley MD      Chief Complaint     Chief Complaint   Patient presents with    Elevated PSA    Prostate cancer      History of Present Illness     Dangelo Clements is a 71 y o  gentleman with history of prostate cancer, he last received Lupron on the 11th November 2019  He is status post space oar placement as well as fiducial marker placement on the 19th of November  He does not require further androgen deprivation therapy after his last injection given that he has plan for a 6 month course of hormone deprivation therapy  ECOG performance status of 0, no new complaints  Feels well  He is having some hot flashes, these are tolerable, however  Eating well, drinking well, no fevers or chills or nausea or vomiting      The following portions of the patient's history were reviewed and updated as appropriate: allergies, current medications, past family history, past medical history, past social history, past surgical history and problem list     Detailed Urologic History     - please refer to HPI    Review of Systems     Review of Systems   Constitutional: Negative  HENT: Negative  Eyes: Negative  Respiratory: Negative  Cardiovascular: Negative  Gastrointestinal: Negative  Endocrine: Negative  Genitourinary:        As per HPI   Musculoskeletal: Negative  Skin: Negative  Allergic/Immunologic: Negative  Neurological: Negative  Hematological: Negative  Psychiatric/Behavioral: Negative  Allergies     No Known Allergies    Physical Exam     Physical Exam   Constitutional: He is oriented to person, place, and time  He appears well-developed and well-nourished  No distress  HENT:   Head: Normocephalic and atraumatic  Eyes: Right eye exhibits no discharge  Left eye exhibits no discharge  Neck: No tracheal deviation present  Cardiovascular: Intact distal pulses  Pulmonary/Chest: Effort normal  No stridor  No respiratory distress  Abdominal: Soft  He exhibits no distension and no mass  There is no tenderness  There is no rebound and no guarding  No hernia  Genitourinary:   Genitourinary Comments: No CVA tenderness, no suprapubic tenderness   Musculoskeletal: He exhibits no edema, tenderness or deformity  Neurological: He is alert and oriented to person, place, and time  No cranial nerve deficit  Coordination normal    Skin: Skin is warm and dry  No rash noted  He is not diaphoretic  No erythema  No pallor  Psychiatric: He has a normal mood and affect  His behavior is normal  Judgment and thought content normal    Nursing note and vitals reviewed            Vital Signs  Vitals:    03/05/20 1125   BP: 130/70   BP Location: Right arm   Patient Position: Sitting   Cuff Size: Standard Pulse: 86   Weight: 108 kg (237 lb 3 2 oz)   Height: 6' 3" (1 905 m)         Current Medications       Current Outpatient Medications:     Acetaminophen (TYLENOL) 325 MG CAPS, Take by mouth as needed , Disp: , Rfl:     Multiple Vitamin (MULTIVITAMINS PO), Take by mouth daily , Disp: , Rfl:     tamsulosin (FLOMAX) 0 4 mg, Take 1 capsule (0 4 mg total) by mouth every other day, Disp: 30 capsule, Rfl: 0    XELPROS 0 005 % EMUL, INSTILL 1 DROP DAILY IN EACH EYE AS DIRECTED, Disp: , Rfl: 3      Active Problems     Patient Active Problem List   Diagnosis    Mixed hyperlipidemia    Prediabetes    Prostate cancer ([facility] )    Encounter for monitoring androgen deprivation therapy         Past Medical History     Past Medical History:   Diagnosis Date    Benign neoplasm of colon     Cancer ([facility] )     Prostate CA    Colon, diverticulosis     ED (erectile dysfunction) of organic origin     Former tobacco use     Mixed hyperlipidemia     Obesity     Prediabetes          Surgical History     Past Surgical History:   Procedure Laterality Date    COLONOSCOPY  10/30/2014    COLONOSCOPY      COLONOSCOPY      RADIOACTIVE PLAQUE INSERTION N/A 2019    Procedure: TISSUE MARKER INSERTION, SPACEOAR INSERTION;  Surgeon: Jada Wood MD;  [facility];  Service: Urology    VENTRAL HERNIA REPAIR           Family History     Family History   Problem Relation Age of Onset    Heart disease Father         Cardiac pacemaker    Cancer Sister     Stroke Brother          Social History     Social History     Social History     Tobacco Use   Smoking Status Former Smoker    Packs/day: 0 25    Years: 6 00    Pack years: 1 50    Types: Cigarettes    Last attempt to quit:     Years since quittin 2   Smokeless Tobacco Never Used         Pertinent Lab Values     Lab Results   Component Value Date    CREATININE 1 19 2019       Lab Results   Component Value Date    PSA 4 2 (H) 05/20/2019             Pertinent Imaging      No new imaging for my review

## 2020-03-16 ENCOUNTER — RADIATION ONCOLOGY FOLLOW-UP (OUTPATIENT)
Dept: RADIATION ONCOLOGY | Facility: CLINIC | Age: 70
End: 2020-03-16
Attending: RADIOLOGY
Payer: COMMERCIAL

## 2020-03-16 VITALS
HEART RATE: 79 BPM | SYSTOLIC BLOOD PRESSURE: 138 MMHG | BODY MASS INDEX: 29.72 KG/M2 | TEMPERATURE: 96.9 F | DIASTOLIC BLOOD PRESSURE: 80 MMHG | WEIGHT: 239 LBS | RESPIRATION RATE: 18 BRPM | HEIGHT: 75 IN

## 2020-03-16 DIAGNOSIS — C61 PROSTATE CANCER (HCC): Primary | ICD-10-CM

## 2020-03-16 PROCEDURE — G0463 HOSPITAL OUTPT CLINIC VISIT: HCPCS | Performed by: RADIOLOGY

## 2020-03-16 PROCEDURE — 99211 OFF/OP EST MAY X REQ PHY/QHP: CPT | Performed by: RADIOLOGY

## 2020-03-16 NOTE — PROGRESS NOTES
Claudette Pyles 1950 is a 71 y o  male     Follow up visit     Oncology History    Returns for follow up post prostate radiation completed on 2/11/20    3/5/20 Jesenia Dickinson MD  No further Lupron as per the treatment plan, we will check a PSA in 3 months  We do expect his PSA was increased somewhat after his androgen deprivation therapy has worn off, this will not be concerning, we will look for continued stabilization of his elkin value of PSA     6/12/20 Verlie Nissen, PA-C with PSA prior        Prostate cancer ([facility] )    8/23/2019 Initial Diagnosis     Prostate cancer ([facility] )      8/23/2019 Biopsy     Final Diagnosis  A  Prostate, right lateral base, core needle biopsy: Benign prostate glands  No malignancy is identified  B  Prostate, right medial base, core needle biopsy: Benign prostate glands  No malignancy is identified  C  Prostate, right lateral mid, core needle biopsy: Focal high-grade PIN  No prostatic adenocarcinoma is identified  D  Prostate, right medial mid, core needle biopsy:Benign prostate glands  No malignancy is identified  E  Prostate, right lateral apex, core needle biopsy: Benign prostate glands  No malignancy is identified  F  Prostate, right medial apex, core needle biopsy: Benign prostate glands  No malignancy is identified      G  Prostate, left lateral base, core needle biopsy:             - Prostatic adenocarcinoma, [place] score 3 + 4 = 7, Prognostic Grade Group II, discontinuously involving 60% of one core biopsy   Approximately 10% Saint Ann pattern 4                         H  Prostate, left medial, core needle biopsy: Prostatic adenocarcinoma, Faizan score 3 + 3 = 6, Prognostic Grade Group I, involving less than  5% of one disrupted core biopsy                I  Prostate, left lateral mid, core needle biopsy:             - Small focus of atypical prostate glands, suspicious for prostatic adenocarcinoma, involving less than 5% of one core biopsy      J  Prostate, left medial mid, core needle biopsy:             - Prostatic adenocarcinoma, Salyer score 3 + 4 = 7, Prognostic Grade Group II, discontinuously involving 80% of one core biopsy  Approximately 20% Faizan pattern 4               K  Prostate, left lateral apex, core needle biopsy:             - Prostatic adenocarcinoma, Salyer score 3 + 3 = 6, Prognostic Grade Group I, involving 5% of one core biopsy  L  Prostate, left medial apex, core needle biopsy:             - Prostatic adenocarcinoma, Faizan 3 + 3 = 6, Prognostic Grade Group I, involving less than 5% of one core biopsy             9/23/2019 -  Cancer Staged     Staging form: Prostate, AJCC 8th Edition  - Clinical: Stage IIB (cT1c, cN0, cM0, PSA: 4 2, Grade Group: 2) - Signed by Gerhardt Laughter, MD on 9/23/2019  Prostate specific antigen (PSA) range: Less than 10  Histologic grading system: 5 grade system  Faizan score: 7        10/11/2019 -  Hormone Therapy     Firmagon on 10/11/19 and Lupron on 11/11/19 12/9/2019 - 2/11/2020 Radiation     7920cGy in 44 daily 180cGy fractions tot he prostate and proximal seminal vesicles         Clinical Trial: no      Health Maintenance   Topic Date Due    Annual Physical  07/09/1968    CRC Screening: Colonoscopy  10/30/2017    Fall Risk  05/20/2020    BMI: Followup Plan  05/20/2020    Depression Screening PHQ  09/13/2020    BMI: Adult  03/05/2021    DTaP,Tdap,and Td Vaccines (2 - Td) 05/03/2026    Hepatitis C Screening  Completed    Influenza Vaccine  Completed    Pneumococcal Vaccine: 65+ Years  Completed    Pneumococcal Vaccine: Pediatrics (0 to 5 Years) and At-Risk Patients (6 to 59 Years)  Aged Out    HIB Vaccine  Aged Out    Hepatitis B Vaccine  Aged Out    IPV Vaccine  Aged Out    Hepatitis A Vaccine  Aged Out    Meningococcal ACWY Vaccine  Aged Out    HPV Vaccine  Aged Out       Patient Active Problem List   Diagnosis    Mixed hyperlipidemia    Prediabetes    Prostate cancer ([facility] [address] )    Encounter for monitoring androgen deprivation therapy     Past Medical History:   Diagnosis Date    Benign neoplasm of colon     Cancer ([address] )     Prostate CA    Colon, diverticulosis     ED (erectile dysfunction) of organic origin     Former tobacco use     Mixed hyperlipidemia     Obesity     Prediabetes      Past Surgical History:   Procedure Laterality Date    COLONOSCOPY  10/30/2014    COLONOSCOPY  2004    COLONOSCOPY  2006    RADIOACTIVE PLAQUE INSERTION N/A 2019    Procedure: TISSUE MARKER INSERTION, SPACEOAR INSERTION;  Surgeon: Sheldon Link MD;  [facility];  Service: Urology    VENTRAL HERNIA REPAIR       Family History   Problem Relation Age of Onset    Heart disease Father         Cardiac pacemaker    Cancer Sister     Stroke Brother      Social History     Socioeconomic History    Marital status: /Civil Union     Spouse name: Not on file    Number of children: Not on file    Years of education: Not on file    Highest education level: Not on file   Occupational History    Not on file   Social Needs    Financial resource strain: Not on file    Food insecurity:     Worry: Not on file     Inability: Not on file    Transportation needs:     Medical: Not on file     Non-medical: Not on file   Tobacco Use    Smoking status: Former Smoker     Packs/day: 0 25     Years: 6 00     Pack years: 1 50     Types: Cigarettes     Last attempt to quit:      Years since quittin 2    Smokeless tobacco: Never Used   Substance and Sexual Activity    Alcohol use: Yes     Frequency: Monthly or less     Drinks per session: 1 or 2     Binge frequency: Never     Comment: socially    Drug use: No    Sexual activity: Yes     Partners: Female   Lifestyle    Physical activity:     Days per week: 7 days     Minutes per session: 10 min    Stress: Not at all   Relationships    Social connections:     Talks on phone: Not on file     Gets together: Not on file     Attends Denominational service: Not on file     Active member of club or organization: Not on file     Attends meetings of clubs or organizations: Not on file     Relationship status: Not on file    Intimate partner violence:     Fear of current or ex partner: Not on file     Emotionally abused: Not on file     Physically abused: Not on file     Forced sexual activity: Not on file   Other Topics Concern    Not on file   Social History Narrative    Not on file       Current Outpatient Medications:     Acetaminophen (TYLENOL) 325 MG CAPS, Take by mouth as needed , Disp: , Rfl:     Multiple Vitamin (MULTIVITAMINS PO), Take by mouth daily , Disp: , Rfl:     tamsulosin (FLOMAX) 0 4 mg, Take 1 capsule (0 4 mg total) by mouth every other day, Disp: 30 capsule, Rfl: 0    XELPROS 0 005 % EMUL, INSTILL 1 DROP DAILY IN EACH EYE AS DIRECTED, Disp: , Rfl: 3  No Known Allergies    Review of Systems:  Review of Systems   Constitutional: Negative  Eyes: Negative  Respiratory: Negative  Cardiovascular: Negative  Gastrointestinal: Negative  Endocrine: Negative  Genitourinary: Negative for frequency and hematuria  Nocturia x 2-3   Musculoskeletal: Negative  Skin: Negative  Allergic/Immunologic: Negative  Neurological: Negative  Hematological: Negative  Psychiatric/Behavioral: Negative  Vitals:    03/16/20 0902   BP: 138/80   Pulse: 79   Resp: 18   Temp: (!) 96 9 °F (36 1 °C)   Weight: 108 kg (239 lb)   Height: 6' 3" (1 905 m)    Oxygen 98%    Pain Score: 0-No pain      Imaging:No results found

## 2020-03-16 NOTE — PROGRESS NOTES
Follow-up - Radiation Oncology   Simone Maldonado 1950 71 y o  male [id number]      History of Present Illness   Cancer Staging  Prostate cancer [facility])  Staging form: Prostate, AJCC 8th Edition  - Clinical: Stage IIB (cT1c, cN0, cM0, PSA: 4 2, Grade Group: 2) - Signed by Earnestine Snyder MD on 9/23/2019  Prostate specific antigen (PSA) range: Less than 10  Histologic grading system: 5 grade system  [place] score: 7      Simone Maldonado is a 71y o  year old male and [address] witness with a history of clinical stage T1c prostatic adenocarcinoma, [place] score 7 (3+4) with pretreatment PSA of 4 2ng/mL  He  completed a course of definitive radiation with concurrent 6 months of ADT on 2/11/20  Last Lupron injection was on 11/11/2019  Today, he returns for follow-up evaluation      3/5/20 Rolando Rodriguez MD saw patient for follow-up evaluation and PSA scheduled in June 2020  The patient states that he generally feels well  His main complaints continued hot flashes  He denies any breast tenderness or gynecomastia  His urinary symptoms of urinary frequency and urgency have returned to baseline  He has baseline nocturia 3 times per night  He states that his urinary stream overall is good, but he does push to empty his bladder completely  He is able to empty his bladder without difficulty and this is baseline from prior to radiation  He is taking Flomax every 3-5 days  He denies any hematuria, dysuria, or urinary incontinence  He denies any diarrhea or rectal bleeding  Upcoming appointments   6/12/20 Alicia Ashley PA-C with PSA prior      Historical Information      Prostate cancer [facility])    8/23/2019 Initial Diagnosis     Prostate cancer ([facility] [address] )      8/23/2019 Biopsy     Final Diagnosis  A  Prostate, right lateral base, core needle biopsy: Benign prostate glands  No malignancy is identified  B  Prostate, right medial base, core needle biopsy: Benign prostate glands  No malignancy is identified    C  Prostate, right lateral mid, core needle biopsy: Focal high-grade PIN  No prostatic adenocarcinoma is identified  D  Prostate, right medial mid, core needle biopsy:Benign prostate glands  No malignancy is identified  E  Prostate, right lateral apex, core needle biopsy: Benign prostate glands  No malignancy is identified  F  Prostate, right medial apex, core needle biopsy: Benign prostate glands  No malignancy is identified      G  Prostate, left lateral base, core needle biopsy:             - Prostatic adenocarcinoma, [place] score 3 + 4 = 7, Prognostic Grade Group II, discontinuously involving 60% of one core biopsy  Approximately 10% Faizan pattern 4                         H  Prostate, left medial, core needle biopsy: Prostatic adenocarcinoma, [place]iz[place] score 3 + 3 = 6, Prognostic Grade Group I, involving less than  5% of one disrupted core biopsy                I  Prostate, left lateral mid, core needle biopsy:             - Small focus of atypical prostate glands, suspicious for prostatic adenocarcinoma, involving less than 5% of one core biopsy      J  Prostate, left medial mid, core needle biopsy:             - Prostatic adenocarcinoma, [place] score 3 + 4 = 7, Prognostic Grade Group II, discontinuously involving 80% of one core biopsy  Approximately 20% Faizan pattern 4               K  Prostate, left lateral apex, core needle biopsy:             - Prostatic adenocarcinoma, [place] score 3 + 3 = 6, Prognostic Grade Group I, involving 5% of one core biopsy  L  Prostate, left medial apex, core needle biopsy:             - Prostatic adenocarcinoma, [place] 3 + 3 = 6, Prognostic Grade Group I, involving less than 5% of one core biopsy             9/23/2019 -  Cancer Staged     Staging form: Prostate, AJCC 8th Edition  - Clinical: Stage IIB (cT1c, cN0, cM0, PSA: 4 2, Grade Group: 2) - Signed by Agapito Lo MD on 9/23/2019  Prostate specific antigen (PSA) range: Less than 10  Histologic grading system: 5 grade system  Faizan score: 7        10/11/2019 -  Hormone Therapy     Firmagon on 10/11/19 and Lupron on 19 - 2020 Radiation     7920cGy in 44 daily 180cGy fractions tot he prostate and proximal seminal vesicles         Past Medical History:   Diagnosis Date    Benign neoplasm of colon     Cancer ([facility] [address] )     Prostate CA    Colon, diverticulosis     ED (erectile dysfunction) of organic origin     Former tobacco use     Mixed hyperlipidemia     Obesity     Prediabetes      Past Surgical History:   Procedure Laterality Date    COLONOSCOPY  10/30/2014    COLONOSCOPY      COLONOSCOPY      RADIOACTIVE PLAQUE INSERTION N/A 2019    Procedure: TISSUE MARKER INSERTION, SPACEOAR INSERTION;  Surgeon: Tori Odom MD;  [facility];  Service: Urology    VENTRAL HERNIA REPAIR         Social History   Social History     Substance and Sexual Activity   Alcohol Use Yes    Frequency: Monthly or less    Drinks per session: 1 or 2    Binge frequency: Never    Comment: socially     Social History     Substance and Sexual Activity   Drug Use No     Social History     Tobacco Use   Smoking Status Former Smoker    Packs/day: 0 25    Years: 6 00    Pack years: 1 50    Types: Cigarettes    Last attempt to quit:     Years since quittin 2   Smokeless Tobacco Never Used       Meds/Allergies     Current Outpatient Medications:     Acetaminophen (TYLENOL) 325 MG CAPS, Take by mouth as needed , Disp: , Rfl:     Multiple Vitamin (MULTIVITAMINS PO), Take by mouth daily , Disp: , Rfl:     tamsulosin (FLOMAX) 0 4 mg, Take 1 capsule (0 4 mg total) by mouth every other day, Disp: 30 capsule, Rfl: 0    XELPROS 0 005 % EMUL, INSTILL 1 DROP DAILY IN EACH EYE AS DIRECTED, Disp: , Rfl: 3  No Known Allergies      Review of Systems    Constitutional: Negative  Eyes: Negative  Respiratory: Negative  Cardiovascular: Negative  Gastrointestinal: Negative  Endocrine: Negative  Genitourinary: Negative for frequency and hematuria  Nocturia x 2-3   Musculoskeletal: Negative  Skin: Negative  Allergic/Immunologic: Negative  Neurological: Negative  Hematological: Negative  Psychiatric/Behavioral: Negative  OBJECTIVE:   /80   Pulse 79   Temp (!) 96 9 °F (36 1 °C)   Resp 18   Ht 6' 3" (1 905 m)   Wt 108 kg (239 lb)   BMI 29 87 kg/m²   Pain Assessment:  0  Karnofsky: 90 - Able to carry on normal activity; minor signs or symptoms of disease     Physical Exam   Constitutional: He is oriented to person, place, and time  He appears well-developed and well-nourished  No distress  HENT:   Mouth/Throat: Oropharynx is clear and moist    Eyes: No scleral icterus  Cardiovascular: Normal rate and regular rhythm  No murmur heard  Pulmonary/Chest: Effort normal  No respiratory distress  He has no wheezes  He has no rhonchi  He has no rales  Abdominal: Soft  He exhibits no distension  There is no tenderness  Musculoskeletal: He exhibits no edema  No CVA or spinal tenderness to percussion  Lymphadenopathy:     He has no cervical adenopathy  Right: No inguinal and no supraclavicular adenopathy present  Left: No inguinal and no supraclavicular adenopathy present  Neurological: He is alert and oriented to person, place, and time  Psychiatric: He has a normal mood and affect  His behavior is normal    Nursing note and vitals reviewed  Assessment/Plan:  Guillermo Benjamin is a 71y o  year old male and [address] witness with a history with a history of intermediate risk prostatic adenocarcinoma status post definitive radiation with concurrent 6 months of ADT  He completed radiation on 2/11/20; last Lupron injection was November 2019  He is recovering well from radiation and  and GI function have largely returned to baseline    I recommended discontinuing Flomax as his urinary symptoms are now baseline prior to treatment and he is taking medication infrequently so there is likely no significant effect        He will have baseline PSA drawn in June 2020 and follow-up with Urology afterwards  Lupron effect likely waning at that point  I have asked that he follow-up in our office in 6 months  We are available for any questions or concerns that may arise  Chapo Bolanos MD  3/16/2020,9:55 AM    Portions of the record may have been created with voice recognition software   Occasional wrong word or "sound a like" substitutions may have occurred due to the inherent limitations of voice recognition software   Read the chart carefully and recognize, using context, where substitutions have occurred

## 2020-05-28 ENCOUNTER — TELEPHONE (OUTPATIENT)
Dept: INTERNAL MEDICINE CLINIC | Facility: CLINIC | Age: 70
End: 2020-05-28

## 2020-05-29 ENCOUNTER — OFFICE VISIT (OUTPATIENT)
Dept: INTERNAL MEDICINE CLINIC | Facility: CLINIC | Age: 70
End: 2020-05-29
Payer: COMMERCIAL

## 2020-05-29 VITALS
BODY MASS INDEX: 31.08 KG/M2 | TEMPERATURE: 97.8 F | DIASTOLIC BLOOD PRESSURE: 86 MMHG | WEIGHT: 250 LBS | SYSTOLIC BLOOD PRESSURE: 128 MMHG | OXYGEN SATURATION: 97 % | HEIGHT: 75 IN | HEART RATE: 75 BPM

## 2020-05-29 DIAGNOSIS — E78.2 MIXED HYPERLIPIDEMIA: Chronic | ICD-10-CM

## 2020-05-29 DIAGNOSIS — R73.03 PREDIABETES: Primary | Chronic | ICD-10-CM

## 2020-05-29 DIAGNOSIS — Z86.010 PERSONAL HISTORY OF COLONIC POLYPS: ICD-10-CM

## 2020-05-29 PROCEDURE — 3008F BODY MASS INDEX DOCD: CPT | Performed by: INTERNAL MEDICINE

## 2020-05-29 PROCEDURE — 4040F PNEUMOC VAC/ADMIN/RCVD: CPT | Performed by: INTERNAL MEDICINE

## 2020-05-29 PROCEDURE — 1160F RVW MEDS BY RX/DR IN RCRD: CPT | Performed by: INTERNAL MEDICINE

## 2020-05-29 PROCEDURE — 1036F TOBACCO NON-USER: CPT | Performed by: INTERNAL MEDICINE

## 2020-05-29 PROCEDURE — 99214 OFFICE O/P EST MOD 30 MIN: CPT | Performed by: INTERNAL MEDICINE

## 2020-06-05 ENCOUNTER — APPOINTMENT (OUTPATIENT)
Dept: LAB | Facility: CLINIC | Age: 70
End: 2020-06-05
Payer: COMMERCIAL

## 2020-06-05 DIAGNOSIS — C61 PROSTATE CANCER (HCC): ICD-10-CM

## 2020-06-05 LAB — PSA SERPL-MCNC: 0.4 NG/ML (ref 0–4)

## 2020-06-05 PROCEDURE — 84153 ASSAY OF PSA TOTAL: CPT

## 2020-07-17 ENCOUNTER — OFFICE VISIT (OUTPATIENT)
Dept: GASTROENTEROLOGY | Facility: CLINIC | Age: 70
End: 2020-07-17
Payer: COMMERCIAL

## 2020-07-17 VITALS
WEIGHT: 254 LBS | BODY MASS INDEX: 31.58 KG/M2 | TEMPERATURE: 98 F | HEART RATE: 74 BPM | DIASTOLIC BLOOD PRESSURE: 80 MMHG | HEIGHT: 75 IN | SYSTOLIC BLOOD PRESSURE: 130 MMHG

## 2020-07-17 DIAGNOSIS — Z86.010 HISTORY OF COLON POLYPS: Primary | ICD-10-CM

## 2020-07-17 DIAGNOSIS — Z20.822 ENCOUNTER FOR LABORATORY TESTING FOR COVID-19 VIRUS: ICD-10-CM

## 2020-07-17 PROCEDURE — 99243 OFF/OP CNSLTJ NEW/EST LOW 30: CPT | Performed by: INTERNAL MEDICINE

## 2020-07-17 NOTE — PROGRESS NOTES
[facility]    Dear Tenzin Hutchison,     I had the pleasure of seeing your patient Reva Hendricks in the office today and I thank you for this kind referral        Chief Complaint:  History of colon polyps      HPI:  Reva Hendricks is a 79 y o  male who presents with history of colon polyps with his last colonoscopy done in [place] approximately 6 years ago  Patient denies any abdominal pain, change in bowel habits, change in stool caliber, melena, hematochezia, rectal bleeding, tenesmus, or weight loss  He has no shortness of breath or chest pain  No dizziness  The patient takes an 81 mg aspirin a day but no other anticoagulants  No family history of colon cancer         Review of Systems:   Constitutional: No fever or chills, feels well, no tiredness, no recent weight gain or weight loss  HENT: No complaints of earache, no hearing loss, no nosebleeds, no nasal discharge, no sore throat, no hoarseness  Eyes: No complaints of eye pain, no red eyes, no discharge from eyes, no itchy eyes  Cardiovascular: No complaints of slow heart rate, no fast heart rate, no chest pain, no palpitations, no leg claudication, no lower extremity edema  Respiratory: No complaints of shortness of breath, no wheezing, no cough, no SOB on exertion, no orthopnea  Gastrointestinal: As noted in HPI  Genitourinary: No complaints of dysuria, no incontinence, no hesitancy, no nocturia  Musculoskeletal: No complaints of arthralgia, no myalgias, no joint swelling or stiffness, no limb pain or swelling  Neurological: No complaints of headache, no confusion, no convulsions, no numbness or tingling, no dizziness or fainting, no limb weakness, no difficulty walking  Skin: No complaints of skin rash or skin lesions, no itching, no skin wound, no dry skin  Hematological/Lymphatic: No complaints of swollen glands, does not bleed easy  Allergic/Immunologic: No immunocompromised state    Endocrine:  No complaints of polyuria, no polydipsia  Psychiatric/Behavioral: is not suicidal, no sleep disturbances, no anxiety or depression, no change in personality, no emotional problems  Historical Information   Past Medical History:   Diagnosis Date    Benign neoplasm of colon     Cancer ([address] )     Prostate CA    Colon, diverticulosis     ED (erectile dysfunction) of organic origin     Former tobacco use     Mixed hyperlipidemia     Obesity     Prediabetes      Past Surgical History:   Procedure Laterality Date    COLONOSCOPY  10/30/2014    COLONOSCOPY  2004    COLONOSCOPY  2006    RADIOACTIVE PLAQUE INSERTION N/A 2019    Procedure: TISSUE MARKER INSERTION, SPACEOAR INSERTION;  Surgeon: Shaila Garza MD;  [facility];  Service: Urology    VENTRAL HERNIA REPAIR       Social History   Social History     Substance and Sexual Activity   Alcohol Use Yes    Frequency: Monthly or less    Drinks per session: 1 or 2    Binge frequency: Never    Comment: socially     Social History     Substance and Sexual Activity   Drug Use No     Social History     Tobacco Use   Smoking Status Former Smoker    Packs/day: 0 25    Years: 6 00    Pack years: 1 50    Types: Cigarettes    Last attempt to quit: Elizabeth Ritchie Years since quittin 5   Smokeless Tobacco Never Used     Family History   Problem Relation Age of Onset    Heart disease Father         Cardiac pacemaker    Cancer Sister     Stroke Brother          Current Medications: has a current medication list which includes the following prescription(s): aspirin, multiple vitamin, acetaminophen, tamsulosin, and xelpros         Vital Signs: /80   Pulse 74   Temp 98 °F (36 7 °C)   Ht 6' 3" (1 905 m)   Wt 115 kg (254 lb)   BMI 31 75 kg/m²     Physical Exam:   Constitutional  General Appearance: No acute distress, well appearing and well nourished  Head  Normocephalic  Eyes  Conjunctivae and lids: No swelling, erythema, or discharge  Pupils and irises: Equal, round and reactive to light  Ears, Nose, Mouth, and Throat  External inspection of ears and nose: Normal  Nasal mucosa, septum and turbinates: Normal without edema or erythema/   Oropharynx: Normal with no erythema, edema, exudate or lesions  Neck  Normal range of motion  Neck supple  Cardiovascular  Auscultation of the heart: Normal rate and rhythm, normal S1 and S2 without murmurs  Examination of the extremities for edema and/or varicosities: Normal  Pulmonary/Chest  Respiratory effort: No increased work of breathing or signs of respiratory distress  Auscultation of lungs: Clear to auscultation, equal breath sounds bilaterally, no wheezes, rales, no rhonchi  Abdomen  Abdomen: Non-tender, no masses  Positive diastasis recti  Liver and spleen: No hepatomegaly or splenomegaly  Musculoskeletal  Gait and station: normal   Digits and Nails: normal without clubbing or cyanosis  Inspection/palpation of joints, bones, and muscles: Normal  Neurological  No nystagmus or asterixis  Skin  Skin and subcutaneous tissue: Normal without rashes or lesions  Lymphatic  Palpation of the lymph nodes in neck: No lymphadenopathy     Psychiatric  Orientation to person, place and time: Normal   Mood and affect: Normal          Labs:   Lab Results   Component Value Date    ALT 32 02/13/2017    AST 18 02/13/2017    BUN 25 11/26/2019    CALCIUM 9 6 11/26/2019     11/26/2019    CO2 25 11/26/2019    CREATININE 1 19 11/26/2019    HDL 69 11/26/2019    HCT 44 6 12/19/2019    HGB 14 0 12/19/2019    HGBA1C 6 1 11/26/2019     12/19/2019    K 4 1 11/26/2019    PSA 0 4 06/05/2020    TRIG 74 11/26/2019    WBC 4 43 12/19/2019         X-Rays & Procedures:   No orders to display         ______________________________________________________________________      Assessment & Plan:      Diagnoses and all orders for this visit:    History of colon polyps    Encounter for laboratory testing for COVID-19 virus      The patient has been scheduled for colonoscopy  I will be happy to inform you of his results and any further recommendations  I would like to thank you for allowing me to participate in his care            With warmest regards,    Aminta Cintron MD, [facility]

## 2020-07-17 NOTE — LETTER
July 17, 2020     Pacheco Claros DO  [address] [address]    Patient: Ligia Wood   YOB: 1950   Date of Visit: 7/17/2020       Dear Dr Zac Clark:    Thank you for referring Ligia Wood to me for evaluation  Below are my notes for this consultation  If you have questions, please do not hesitate to call me  I look forward to following your patient along with you  Sincerely,        Og Ronquillo MD        CC: No Recipients  Og Ronquillo MD  7/17/2020  7:52 AM  Incomplete  [facility]    Dear Yancy Ordoñez,     I had the pleasure of seeing your patient Ligia Wood in the office today and I thank you for this kind referral        Chief Complaint:  History of colon polyps      HPI:  Ligia Wood is a 79 y o  male who presents with history of colon polyps with his last colonoscopy done in [place] approximately 6 years ago  Patient denies any abdominal pain, change in bowel habits, change in stool caliber, melena, hematochezia, rectal bleeding, tenesmus, or weight loss  He has no shortness of breath or chest pain  No dizziness  The patient takes an 81 mg aspirin a day but no other anticoagulants  No family history of colon cancer         Review of Systems:   Constitutional: No fever or chills, feels well, no tiredness, no recent weight gain or weight loss  HENT: No complaints of earache, no hearing loss, no nosebleeds, no nasal discharge, no sore throat, no hoarseness  Eyes: No complaints of eye pain, no red eyes, no discharge from eyes, no itchy eyes  Cardiovascular: No complaints of slow heart rate, no fast heart rate, no chest pain, no palpitations, no leg claudication, no lower extremity edema  Respiratory: No complaints of shortness of breath, no wheezing, no cough, no SOB on exertion, no orthopnea     Gastrointestinal: As noted in HPI  Genitourinary: No complaints of dysuria, no incontinence, no hesitancy, no nocturia  Musculoskeletal: No complaints of arthralgia, no myalgias, no joint swelling or stiffness, no limb pain or swelling  Neurological: No complaints of headache, no confusion, no convulsions, no numbness or tingling, no dizziness or fainting, no limb weakness, no difficulty walking  Skin: No complaints of skin rash or skin lesions, no itching, no skin wound, no dry skin  Hematological/Lymphatic: No complaints of swollen glands, does not bleed easy  Allergic/Immunologic: No immunocompromised state  Endocrine:  No complaints of polyuria, no polydipsia  Psychiatric/Behavioral: is not suicidal, no sleep disturbances, no anxiety or depression, no change in personality, no emotional problems         Historical Information   Past Medical History:   Diagnosis Date    Benign neoplasm of colon     Cancer ([address] )     Prostate CA    Colon, diverticulosis     ED (erectile dysfunction) of organic origin     Former tobacco use     Mixed hyperlipidemia     Obesity     Prediabetes      Past Surgical History:   Procedure Laterality Date    COLONOSCOPY  10/30/2014    COLONOSCOPY      COLONOSCOPY      RADIOACTIVE PLAQUE INSERTION N/A 2019    Procedure: TISSUE MARKER INSERTION, SPACEOAR INSERTION;  Surgeon: Richard Burkett MD;  [facility];  Service: Urology    VENTRAL HERNIA REPAIR       Social History   Social History     Substance and Sexual Activity   Alcohol Use Yes    Frequency: Monthly or less    Drinks per session: 1 or 2    Binge frequency: Never    Comment: socially     Social History     Substance and Sexual Activity   Drug Use No     Social History     Tobacco Use   Smoking Status Former Smoker    Packs/day: 0 25    Years: 6 00    Pack years: 1 50    Types: Cigarettes    Last attempt to quit: Heath Pulling Years since quittin 5   Smokeless Tobacco Never Used     Family History   Problem Relation Age of Onset    Heart disease Father         Cardiac pacemaker    Cancer Sister     Stroke Brother          Current Medications: has a current medication list which includes the following prescription(s): aspirin, multiple vitamin, acetaminophen, tamsulosin, and xelpros  Vital Signs: /80   Pulse 74   Temp 98 °F (36 7 °C)   Ht 6' 3" (1 905 m)   Wt 115 kg (254 lb)   BMI 31 75 kg/m²      Physical Exam:   Constitutional  General Appearance: No acute distress, well appearing and well nourished  Head  Normocephalic  Eyes  Conjunctivae and lids: No swelling, erythema, or discharge  Pupils and irises: Equal, round and reactive to light  Ears, Nose, Mouth, and Throat  External inspection of ears and nose: Normal  Nasal mucosa, septum and turbinates: Normal without edema or erythema/   Oropharynx: Normal with no erythema, edema, exudate or lesions  Neck  Normal range of motion  Neck supple  Cardiovascular  Auscultation of the heart: Normal rate and rhythm, normal S1 and S2 without murmurs  Examination of the extremities for edema and/or varicosities: Normal  Pulmonary/Chest  Respiratory effort: No increased work of breathing or signs of respiratory distress  Auscultation of lungs: Clear to auscultation, equal breath sounds bilaterally, no wheezes, rales, no rhonchi  Abdomen  Abdomen: Non-tender, no masses  Positive diastasis recti  Liver and spleen: No hepatomegaly or splenomegaly  Musculoskeletal  Gait and station: normal   Digits and Nails: normal without clubbing or cyanosis  Inspection/palpation of joints, bones, and muscles: Normal  Neurological  No nystagmus or asterixis  Skin  Skin and subcutaneous tissue: Normal without rashes or lesions  Lymphatic  Palpation of the lymph nodes in neck: No lymphadenopathy     Psychiatric  Orientation to person, place and time: Normal   Mood and affect: Normal          Labs:   Lab Results   Component Value Date    ALT 32 02/13/2017    AST 18 02/13/2017    BUN 25 11/26/2019    CALCIUM 9 6 11/26/2019  11/26/2019    CO2 25 11/26/2019    CREATININE 1 19 11/26/2019    HDL 69 11/26/2019    HCT 44 6 12/19/2019    HGB 14 0 12/19/2019    HGBA1C 6 1 11/26/2019     12/19/2019    K 4 1 11/26/2019    PSA 0 4 06/05/2020    TRIG 74 11/26/2019    WBC 4 43 12/19/2019         X-Rays & Procedures:   No orders to display         ______________________________________________________________________      Assessment & Plan:      Diagnoses and all orders for this visit:    History of colon polyps    Encounter for laboratory testing for COVID-19 virus      The patient has been scheduled for colonoscopy  I will be happy to inform you of his results and any further recommendations  I would like to thank you for allowing me to participate in his care            With warmest regards,    Omayra Alba MD, [facility]

## 2020-07-17 NOTE — H&P (VIEW-ONLY)
[facility]    Dear William Rachel,     I had the pleasure of seeing your patient Logan Law in the office today and I thank you for this kind referral        Chief Complaint:  History of colon polyps      HPI:  Logan Law is a 79 y o  male who presents with history of colon polyps with his last colonoscopy done in [place] approximately 6 years ago  Patient denies any abdominal pain, change in bowel habits, change in stool caliber, melena, hematochezia, rectal bleeding, tenesmus, or weight loss  He has no shortness of breath or chest pain  No dizziness  The patient takes an 81 mg aspirin a day but no other anticoagulants  No family history of colon cancer         Review of Systems:   Constitutional: No fever or chills, feels well, no tiredness, no recent weight gain or weight loss  HENT: No complaints of earache, no hearing loss, no nosebleeds, no nasal discharge, no sore throat, no hoarseness  Eyes: No complaints of eye pain, no red eyes, no discharge from eyes, no itchy eyes  Cardiovascular: No complaints of slow heart rate, no fast heart rate, no chest pain, no palpitations, no leg claudication, no lower extremity edema  Respiratory: No complaints of shortness of breath, no wheezing, no cough, no SOB on exertion, no orthopnea  Gastrointestinal: As noted in HPI  Genitourinary: No complaints of dysuria, no incontinence, no hesitancy, no nocturia  Musculoskeletal: No complaints of arthralgia, no myalgias, no joint swelling or stiffness, no limb pain or swelling  Neurological: No complaints of headache, no confusion, no convulsions, no numbness or tingling, no dizziness or fainting, no limb weakness, no difficulty walking  Skin: No complaints of skin rash or skin lesions, no itching, no skin wound, no dry skin  Hematological/Lymphatic: No complaints of swollen glands, does not bleed easy  Allergic/Immunologic: No immunocompromised state    Endocrine:  No complaints of polyuria, no polydipsia  Psychiatric/Behavioral: is not suicidal, no sleep disturbances, no anxiety or depression, no change in personality, no emotional problems  Historical Information   Past Medical History:   Diagnosis Date    Benign neoplasm of colon     Cancer ([address] )     Prostate CA    Colon, diverticulosis     ED (erectile dysfunction) of organic origin     Former tobacco use     Mixed hyperlipidemia     Obesity     Prediabetes      Past Surgical History:   Procedure Laterality Date    COLONOSCOPY  10/30/2014    COLONOSCOPY  2004    COLONOSCOPY  2006    RADIOACTIVE PLAQUE INSERTION N/A 2019    Procedure: TISSUE MARKER INSERTION, SPACEOAR INSERTION;  Surgeon: Sammi Croft MD;  [facility];  Service: Urology    VENTRAL HERNIA REPAIR       Social History   Social History     Substance and Sexual Activity   Alcohol Use Yes    Frequency: Monthly or less    Drinks per session: 1 or 2    Binge frequency: Never    Comment: socially     Social History     Substance and Sexual Activity   Drug Use No     Social History     Tobacco Use   Smoking Status Former Smoker    Packs/day: 0 25    Years: 6 00    Pack years: 1 50    Types: Cigarettes    Last attempt to quit: Raven Zhao Years since quittin 5   Smokeless Tobacco Never Used     Family History   Problem Relation Age of Onset    Heart disease Father         Cardiac pacemaker    Cancer Sister     Stroke Brother          Current Medications: has a current medication list which includes the following prescription(s): aspirin, multiple vitamin, acetaminophen, tamsulosin, and xelpros         Vital Signs: /80   Pulse 74   Temp 98 °F (36 7 °C)   Ht 6' 3" (1 905 m)   Wt 115 kg (254 lb)   BMI 31 75 kg/m²     Physical Exam:   Constitutional  General Appearance: No acute distress, well appearing and well nourished  Head  Normocephalic  Eyes  Conjunctivae and lids: No swelling, erythema, or discharge  Pupils and irises: Equal, round and reactive to light  Ears, Nose, Mouth, and Throat  External inspection of ears and nose: Normal  Nasal mucosa, septum and turbinates: Normal without edema or erythema/   Oropharynx: Normal with no erythema, edema, exudate or lesions  Neck  Normal range of motion  Neck supple  Cardiovascular  Auscultation of the heart: Normal rate and rhythm, normal S1 and S2 without murmurs  Examination of the extremities for edema and/or varicosities: Normal  Pulmonary/Chest  Respiratory effort: No increased work of breathing or signs of respiratory distress  Auscultation of lungs: Clear to auscultation, equal breath sounds bilaterally, no wheezes, rales, no rhonchi  Abdomen  Abdomen: Non-tender, no masses  Positive diastasis recti  Liver and spleen: No hepatomegaly or splenomegaly  Musculoskeletal  Gait and station: normal   Digits and Nails: normal without clubbing or cyanosis  Inspection/palpation of joints, bones, and muscles: Normal  Neurological  No nystagmus or asterixis  Skin  Skin and subcutaneous tissue: Normal without rashes or lesions  Lymphatic  Palpation of the lymph nodes in neck: No lymphadenopathy     Psychiatric  Orientation to person, place and time: Normal   Mood and affect: Normal          Labs:   Lab Results   Component Value Date    ALT 32 02/13/2017    AST 18 02/13/2017    BUN 25 11/26/2019    CALCIUM 9 6 11/26/2019     11/26/2019    CO2 25 11/26/2019    CREATININE 1 19 11/26/2019    HDL 69 11/26/2019    HCT 44 6 12/19/2019    HGB 14 0 12/19/2019    HGBA1C 6 1 11/26/2019     12/19/2019    K 4 1 11/26/2019    PSA 0 4 06/05/2020    TRIG 74 11/26/2019    WBC 4 43 12/19/2019         X-Rays & Procedures:   No orders to display         ______________________________________________________________________      Assessment & Plan:      Diagnoses and all orders for this visit:    History of colon polyps    Encounter for laboratory testing for COVID-19 virus      The patient has been scheduled for colonoscopy  I will be happy to inform you of his results and any further recommendations  I would like to thank you for allowing me to participate in his care            With warmest regards,    Lorraine Johnson MD, [facility]

## 2020-07-18 DIAGNOSIS — Z86.010 HISTORY OF COLON POLYPS: ICD-10-CM

## 2020-07-18 DIAGNOSIS — Z20.822 ENCOUNTER FOR LABORATORY TESTING FOR COVID-19 VIRUS: ICD-10-CM

## 2020-07-18 PROCEDURE — U0003 INFECTIOUS AGENT DETECTION BY NUCLEIC ACID (DNA OR RNA); SEVERE ACUTE RESPIRATORY SYNDROME CORONAVIRUS 2 (SARS-COV-2) (CORONAVIRUS DISEASE [COVID-19]), AMPLIFIED PROBE TECHNIQUE, MAKING USE OF HIGH THROUGHPUT TECHNOLOGIES AS DESCRIBED BY CMS-2020-01-R: HCPCS

## 2020-07-19 LAB
INPATIENT: NORMAL
SARS-COV-2 RNA SPEC QL NAA+PROBE: NOT DETECTED

## 2020-07-28 ENCOUNTER — ANESTHESIA EVENT (OUTPATIENT)
Dept: GASTROENTEROLOGY | Facility: HOSPITAL | Age: 70
End: 2020-07-28

## 2020-07-28 ENCOUNTER — ANESTHESIA (OUTPATIENT)
Dept: GASTROENTEROLOGY | Facility: HOSPITAL | Age: 70
End: 2020-07-28

## 2020-07-28 ENCOUNTER — HOSPITAL ENCOUNTER (OUTPATIENT)
Dept: GASTROENTEROLOGY | Facility: HOSPITAL | Age: 70
Setting detail: OUTPATIENT SURGERY
Discharge: HOME | End: 2020-07-28
Attending: INTERNAL MEDICINE
Payer: COMMERCIAL

## 2020-07-28 VITALS
DIASTOLIC BLOOD PRESSURE: 78 MMHG | OXYGEN SATURATION: 100 % | WEIGHT: 232.59 LBS | BODY MASS INDEX: 28.92 KG/M2 | SYSTOLIC BLOOD PRESSURE: 138 MMHG | HEIGHT: 75 IN | HEART RATE: 64 BPM | RESPIRATION RATE: 18 BRPM | TEMPERATURE: 98.4 F

## 2020-07-28 DIAGNOSIS — Z20.822 ENCOUNTER FOR LABORATORY TESTING FOR COVID-19 VIRUS: ICD-10-CM

## 2020-07-28 DIAGNOSIS — Z86.010 HISTORY OF COLON POLYPS: ICD-10-CM

## 2020-07-28 PROCEDURE — 88305 TISSUE EXAM BY PATHOLOGIST: CPT | Performed by: PATHOLOGY

## 2020-07-28 PROCEDURE — 45380 COLONOSCOPY AND BIOPSY: CPT | Performed by: INTERNAL MEDICINE

## 2020-07-28 RX ORDER — LIDOCAINE HYDROCHLORIDE 10 MG/ML
0.5 INJECTION, SOLUTION EPIDURAL; INFILTRATION; INTRACAUDAL; PERINEURAL ONCE AS NEEDED
Status: DISCONTINUED | OUTPATIENT
Start: 2020-07-28 | End: 2020-08-01 | Stop reason: HOSPADM

## 2020-07-28 RX ORDER — SODIUM CHLORIDE, SODIUM LACTATE, POTASSIUM CHLORIDE, CALCIUM CHLORIDE 600; 310; 30; 20 MG/100ML; MG/100ML; MG/100ML; MG/100ML
125 INJECTION, SOLUTION INTRAVENOUS CONTINUOUS
Status: DISCONTINUED | OUTPATIENT
Start: 2020-07-28 | End: 2020-08-01 | Stop reason: HOSPADM

## 2020-07-28 RX ORDER — PROPOFOL 10 MG/ML
INJECTION, EMULSION INTRAVENOUS AS NEEDED
Status: DISCONTINUED | OUTPATIENT
Start: 2020-07-28 | End: 2020-07-28 | Stop reason: SURG

## 2020-07-28 RX ORDER — LIDOCAINE HYDROCHLORIDE 10 MG/ML
INJECTION, SOLUTION EPIDURAL; INFILTRATION; INTRACAUDAL; PERINEURAL AS NEEDED
Status: DISCONTINUED | OUTPATIENT
Start: 2020-07-28 | End: 2020-07-28 | Stop reason: SURG

## 2020-07-28 RX ADMIN — LIDOCAINE HYDROCHLORIDE 50 MG: 10 INJECTION, SOLUTION EPIDURAL; INFILTRATION; INTRACAUDAL; PERINEURAL at 11:02

## 2020-07-28 RX ADMIN — SODIUM CHLORIDE, SODIUM LACTATE, POTASSIUM CHLORIDE, AND CALCIUM CHLORIDE 125 ML/HR: .6; .31; .03; .02 INJECTION, SOLUTION INTRAVENOUS at 10:17

## 2020-07-28 RX ADMIN — PROPOFOL 100 MG: 10 INJECTION, EMULSION INTRAVENOUS at 11:02

## 2020-07-28 RX ADMIN — PROPOFOL 50 MG: 10 INJECTION, EMULSION INTRAVENOUS at 11:11

## 2020-07-28 RX ADMIN — PROPOFOL 50 MG: 10 INJECTION, EMULSION INTRAVENOUS at 11:05

## 2020-07-28 RX ADMIN — PROPOFOL 50 MG: 10 INJECTION, EMULSION INTRAVENOUS at 11:08

## 2020-07-28 NOTE — DISCHARGE INSTRUCTIONS
Colonoscopy   WHAT YOU NEED TO KNOW:   A colonoscopy is a procedure to examine the inside of your colon (intestine) with a scope  Polyps or tissue growths may have been removed during your colonoscopy  It is normal to feel bloated and to have some abdominal discomfort  You should be passing gas  If you have hemorrhoids or you had polyps removed, you may have a small amount of bleeding  DISCHARGE INSTRUCTIONS:   Seek care immediately if:   · You have a large amount of bright red blood in your bowel movements  · Your abdomen is hard and firm and you have severe pain  · You have sudden trouble breathing  Contact your healthcare provider if:   · You develop a rash or hives  · You have a fever within 24 hours of your procedure  · You have not had a bowel movement for 3 days after your procedure  · You have questions or concerns about your condition or care  Activity:   · Do not lift, strain, or run  for 3 days after your procedure  · Rest after your procedure  You have been given medicine to relax you  Do not  drive or make important decisions until the day after your procedure  Return to your normal activity as directed  · Relieve gas and discomfort from bloating  by lying on your right side with a heating pad on your abdomen  You may need to take short walks to help the gas move out  Eat small meals until bloating is relieved  If you had polyps removed: For 7 days after your procedure:  · Do not  take aspirin  · Do not  go on long car rides  Help prevent constipation:   · Eat a variety of healthy foods  Healthy foods include fruit, vegetables, whole-grain breads, low-fat dairy products, beans, lean meat, and fish  Ask if you need to be on a special diet  Your healthcare provider may recommend that you eat high-fiber foods such as cooked beans  Fiber helps you have regular bowel movements  · Drink liquids as directed    Adults should drink between 9 and 13 eight-ounce cups of liquid every day  Ask what amount is best for you  For most people, good liquids to drink are water, juice, and milk  · Exercise as directed  Talk to your healthcare provider about the best exercise plan for you  Exercise can help prevent constipation, decrease your blood pressure and improve your health  Follow up with your healthcare provider as directed:  Write down your questions so you remember to ask them during your visits  © 2017 [employer] [employer] Information is for End User's use only and may not be sold, redistributed or otherwise used for commercial purposes  All illustrations and images included in CareNotes® are the copyrighted property of [employer]  or [employer]  The above information is an  only  It is not intended as medical advice for individual conditions or treatments  Talk to your doctor, nurse or pharmacist before following any medical regimen to see if it is safe and effective for you

## 2020-07-28 NOTE — ANESTHESIA PREPROCEDURE EVALUATION
Review of Systems/Medical History  Patient summary reviewed        Cardiovascular  Negative cardio ROS Exercise tolerance (METS): >4,  Hyperlipidemia,    Pulmonary  Negative pulmonary ROS No asthma , No recent URI ,        GI/Hepatic  Negative GI/hepatic ROS          Negative  ROS Prostatic disorder, history of prostate cancer       Endo/Other  Negative endo/other ROS      GYN  Negative gynecology ROS          Hematology  Negative hematology ROS      Musculoskeletal  Negative musculoskeletal ROS        Neurology  Negative neurology ROS      Psychology   Negative psychology ROS              Physical Exam    Airway    Mallampati score: II  TM Distance: >3 FB       Dental       Cardiovascular  Comment: Negative ROS,     Pulmonary      Other Findings        Anesthesia Plan  ASA Score- 2     Anesthesia Type- IV sedation with anesthesia with ASA Monitors  Additional Monitors:   Airway Plan:     Comment: LORRAINE Pierson , have personally seen and evaluated the patient prior to anesthetic care  I have reviewed the pre-anesthetic record, and other medical records if appropriate to the anesthetic care  If a CRNA is involved in the case, I have reviewed the CRNA assessment, if present, and agree  Risks/benefits and alternatives discussed with patient including possible PONV, sore throat, and possibility of rare anesthetic and surgical emergencies        Plan Factors-    Induction-     Postoperative Plan-     Informed Consent- Anesthetic plan and risks discussed with patient  I personally reviewed this patient with the CRNA  Discussed and agreed on the Anesthesia Plan with the CRNA  James Mccollum

## 2020-07-28 NOTE — INTERVAL H&P NOTE
H&P reviewed  After examining the patient I find no changes in the patients condition since the H&P had been written      Vitals:    07/28/20 1003   BP: 156/92   Pulse: 87   Resp: 20   Temp: 98 3 °F (36 8 °C)   SpO2: 99%

## 2020-07-28 NOTE — ANESTHESIA POSTPROCEDURE EVALUATION
Post-Op Assessment Note    CV Status:  Stable  Pain Score: 0    Pain management: adequate     Mental Status:  Somnolent   Hydration Status:  Euvolemic   PONV Controlled:  Controlled   Airway Patency:  Patent   Post Op Vitals Reviewed: Yes      Staff: CRNA           BP (!) 86/56 (07/28/20 1118)    Temp 98 4 °F (36 9 °C) (07/28/20 1118)    Pulse 80 (07/28/20 1118)   Resp 14 (07/28/20 1118)    SpO2 98 % (07/28/20 1118)

## 2020-09-02 ENCOUNTER — CLINICAL SUPPORT (OUTPATIENT)
Dept: RADIATION ONCOLOGY | Facility: CLINIC | Age: 70
End: 2020-09-02
Attending: RADIOLOGY
Payer: COMMERCIAL

## 2020-09-02 VITALS
TEMPERATURE: 97 F | RESPIRATION RATE: 16 BRPM | HEIGHT: 75 IN | SYSTOLIC BLOOD PRESSURE: 142 MMHG | BODY MASS INDEX: 29.84 KG/M2 | DIASTOLIC BLOOD PRESSURE: 80 MMHG | WEIGHT: 240 LBS | HEART RATE: 76 BPM

## 2020-09-02 DIAGNOSIS — C61 PROSTATE CANCER (HCC): Primary | ICD-10-CM

## 2020-09-02 PROCEDURE — 99211 OFF/OP EST MAY X REQ PHY/QHP: CPT | Performed by: RADIOLOGY

## 2020-09-02 PROCEDURE — G0463 HOSPITAL OUTPT CLINIC VISIT: HCPCS | Performed by: RADIOLOGY

## 2020-09-02 NOTE — PROGRESS NOTES
Follow-up - Radiation Oncology   Carina Jason 1950 79 y o  male [id number]      History of Present Illness   Cancer Staging  Prostate cancer [facility])  Staging form: Prostate, AJCC 8th Edition  - Clinical: Stage IIB (cT1c, cN0, cM0, PSA: 4 2, Grade Group: 2) - Signed by Ally Moreno MD on 9/23/2019  Prostate specific antigen (PSA) range: Less than 10  Histologic grading system: 5 grade system  [place] score: 7      Carina Jason is a 79y o  year old male and [address] witness with a history of clinical stage T1c prostatic adenocarcinoma, Faizan score 7 (3+4) with pretreatment PSA of 4 2ng/mL  He  completed a course of definitive radiation with concurrent 6 months of ADT on 2/11/20  Last Lupron injection was on 11/11/2019  Today, he returns for follow-up evaluation      The patient states that he generally feels well  His main complaint is still persistent hot flashes  He denies any breast tenderness or gynecomastia  he denies any significant urinary symptoms including hematuria, dysuria, or urinary incontinence  He has baseline nocturia 3 times per night  He is able to empty his bladder without difficulty at this time and has not taken Flomax for over 3 weeks  He denies any diarrhea or rectal bleeding  Lab Results  Component Value Date   PSA 0 4 06/05/2020   PSA 4 2 (H) 05/20/2019    Interval history significant for:  7/28/20 Colonoscopy Quiana Mcmillan MD   FINDINGS:  · One 2 mm sessile polyp in the rectosigmoid; performed complete en bloc removal by cold biopsy  · Multiple small and large moderate diverticula in the cecum, ascending colon, descending colon and sigmoid colon    Colon, Rectosigmoid, Polypectomy:  - Tubular adenoma  - No high grade dysplasia and no evidence of malignancy  No urology follow up scheduled at this time  Planning to call for appointment        Historical Information   Oncology History        Prostate cancer [address] [address] )   8/23/2019 Initial Diagnosis    Prostate cancer ([facility] [address] ) 8/23/2019 Biopsy    Final Diagnosis  A  Prostate, right lateral base, core needle biopsy: Benign prostate glands  No malignancy is identified  B  Prostate, right medial base, core needle biopsy: Benign prostate glands  No malignancy is identified  C  Prostate, right lateral mid, core needle biopsy: Focal high-grade PIN  No prostatic adenocarcinoma is identified  D  Prostate, right medial mid, core needle biopsy:Benign prostate glands  No malignancy is identified  E  Prostate, right lateral apex, core needle biopsy: Benign prostate glands  No malignancy is identified  F  Prostate, right medial apex, core needle biopsy: Benign prostate glands  No malignancy is identified      G  Prostate, left lateral base, core needle biopsy:             - Prostatic adenocarcinoma, Faizan score 3 + 4 = 7, Prognostic Grade Group II, discontinuously involving 60% of one core biopsy  Approximately 10% Faizan pattern 4                         H  Prostate, left medial, core needle biopsy: Prostatic adenocarcinoma, [place] score 3 + 3 = 6, Prognostic Grade Group I, involving less than  5% of one disrupted core biopsy                I  Prostate, left lateral mid, core needle biopsy:             - Small focus of atypical prostate glands, suspicious for prostatic adenocarcinoma, involving less than 5% of one core biopsy      J  Prostate, left medial mid, core needle biopsy:             - Prostatic adenocarcinoma, Faizan score 3 + 4 = 7, Prognostic Grade Group II, discontinuously involving 80% of one core biopsy  Approximately 20% [place] pattern 4               K  Prostate, left lateral apex, core needle biopsy:             - Prostatic adenocarcinoma, [place] score 3 + 3 = 6, Prognostic Grade Group I, involving 5% of one core biopsy  L  Prostate, left medial apex, core needle biopsy:             - Prostatic adenocarcinoma, [place] 3 + 3 = 6, Prognostic Grade Group I, involving less than 5% of one core biopsy  2019 -  Cancer Staged    Staging form: Prostate, AJCC 8th Edition  - Clinical: Stage IIB (cT1c, cN0, cM0, PSA: 4 2, Grade Group: 2) - Signed by Checo Rutledge MD on 2019  Prostate specific antigen (PSA) range: Less than 10  Histologic grading system: 5 grade system  [place] score: 7       10/11/2019 -  Hormone Therapy    Firmagon on 10/11/19 and Lupron on 19 - 2020 Radiation    7920cGy in 44 daily 180cGy fractions tot he prostate and proximal seminal vesicles         Past Medical History:   Diagnosis Date    Benign neoplasm of colon     Cancer ([address] )     Prostate CA    Colon polyp     Colon, diverticulosis     ED (erectile dysfunction) of organic origin     Former tobacco use     Mixed hyperlipidemia     Obesity     Prediabetes      Past Surgical History:   Procedure Laterality Date    COLONOSCOPY  10/30/2014    COLONOSCOPY      COLONOSCOPY      RADIOACTIVE PLAQUE INSERTION N/A 2019    Procedure: TISSUE MARKER INSERTION, SPACEOAR INSERTION;  Surgeon: Peggyann Bosworth, MD;  [facility];  Service: Urology    VENTRAL HERNIA REPAIR         Social History   Social History     Substance and Sexual Activity   Alcohol Use Yes    Frequency: Monthly or less    Drinks per session: 1 or 2    Binge frequency: Never    Comment: socially     Social History     Substance and Sexual Activity   Drug Use No     Social History     Tobacco Use   Smoking Status Former Smoker    Packs/day: 0 25    Years: 6 00    Pack years: 1 50    Types: Cigarettes    Last attempt to quit:     Years since quittin 6   Smokeless Tobacco Never Used         Meds/Allergies     Current Outpatient Medications:     Acetaminophen (TYLENOL) 325 MG CAPS, Take by mouth as needed , Disp: , Rfl:     ASPIRIN 81 PO, Take by mouth, Disp: , Rfl:     Multiple Vitamin (MULTIVITAMINS PO), Take by mouth daily , Disp: , Rfl:     XELPROS 0 005 % EMUL, INSTILL 1 DROP DAILY IN [facility] EYE AS DIRECTED, Disp: , Rfl: 3    tamsulosin (FLOMAX) 0 4 mg, Take 1 capsule (0 4 mg total) by mouth every other day (Patient not taking: Reported on 7/17/2020), Disp: 30 capsule, Rfl: 0  No Known Allergies      Review of Systems   Constitutional: Negative  HENT: Negative  Eyes: Negative  Respiratory: Negative  Cardiovascular: Negative  Gastrointestinal: Negative  Endocrine: Negative  Genitourinary:        Nocturia x 3 or more   Musculoskeletal: Positive for arthralgias  Skin: Negative  Allergic/Immunologic: Negative  Neurological: Negative  Hematological: Negative  Psychiatric/Behavioral: Negative  OBJECTIVE:   /80   Pulse 76   Temp (!) 97 °F (36 1 °C)   Resp 16   Ht 6' 3" (1 905 m)   Wt 109 kg (240 lb)   BMI 30 00 kg/m²   [place]: 90 - Able to carry on normal activity; minor signs or symptoms of disease     Physical Exam  Vitals signs and nursing note reviewed  Constitutional:       General: He is not in acute distress  Appearance: He is well-developed  Eyes:      General: No scleral icterus  Conjunctiva/sclera: Conjunctivae normal    Cardiovascular:      Rate and Rhythm: Normal rate and regular rhythm  Heart sounds: Normal heart sounds  No murmur  Pulmonary:      Effort: Pulmonary effort is normal  No respiratory distress  Breath sounds: No wheezing, rhonchi or rales  Abdominal:      General: There is no distension  Palpations: Abdomen is soft  Tenderness: There is no abdominal tenderness  Genitourinary:     Comments: Digital rectal examination demonstrates normal external sphincter tone  The prostate is smooth, firm, and flat  There is no definite palpable nodularity  There is no blood seen on the examiner's finger  Musculoskeletal:      Right lower leg: No edema  Left lower leg: No edema  Comments: No CVA or spinal tenderness to percussion   Lymphadenopathy:      Cervical: No cervical adenopathy  Upper Body:      Right upper body: No supraclavicular adenopathy  Left upper body: No supraclavicular adenopathy  Neurological:      Mental Status: He is alert and oriented to person, place, and time  Gait: Gait normal           RESULTS    Lab Results:  Lab Results   Component Value Date    PSA 0 4 06/05/2020    PSA 4 2 (H) 05/20/2019         Assessment/Plan:  Orders Placed This Encounter   Procedures    PSA Total, Diagnostic    Testosterone        Vipul Griffiths is a 79y o  year old male and [address] witness with a history with a history of intermediate risk prostatic adenocarcinoma status post definitive radiation with concurrent 6 months of ADT  He completed radiation on 2/11/20  He is currently clinically and biochemically MARITA     The patient is due for PSA in December 2020  I will order PSA as well as testosterone given persistent hot flashes  I suspect that his testosterone levels remain low and are slowly recovering  I explained that this can result in prolonged hot flashes  We would expect the symptoms to eventually wane once hormone levels improve and stabilize  In the interim we suggested herbal supplements for symptom improvement such as black cohosh  He will return for follow-up after lab work completed  I encouraged him to schedule follow-up with Urology as well  He is planning to call for appointment  We are available for any questions or concerns that may arise in the interim  Renny Hernandez MD  9/2/2020,9:25 AM    Portions of the record may have been created with voice recognition software   Occasional wrong word or "sound a like" substitutions may have occurred due to the inherent limitations of voice recognition software   Read the chart carefully and recognize, using context, where substitutions have occurred

## 2020-09-02 NOTE — PROGRESS NOTES
Hyun Andre 1950 is a 79 y o  male     Follow up visit     Oncology History   Returns for follow up post prostate radiation completed on 2/11/20  He was last seen in follow up on 3/16/20    Lab Results  Component Value Date   PSA 0 4 06/05/2020   PSA 4 2 (H) 05/20/2019 7/28/20 Colonoscopy Johnny Sneed MD   FINDINGS:  · One 2 mm sessile polyp in the rectosigmoid; performed complete en bloc removal by cold biopsy  · Multiple small and large moderate diverticula in the cecum, ascending colon, descending colon and sigmoid colon      Colon, Rectosigmoid, Polypectomy:  - Tubular adenoma  - No high grade dysplasia and no evidence of malignancy  No urology follow up scheduled  6/12 appt was cancelled  Saw Dr Sara Eastman 3/5  Will call  Still bothered with hot flashes both day and night  [facility]   8/23/2019 Initial Diagnosis    [facility] )     8/23/2019 Biopsy    Final Diagnosis  A  Prostate, right lateral base, core needle biopsy: Benign prostate glands  No malignancy is identified  B  Prostate, right medial base, core needle biopsy: Benign prostate glands  No malignancy is identified  C  Prostate, right lateral mid, core needle biopsy: Focal high-grade PIN  No prostatic adenocarcinoma is identified  D  Prostate, right medial mid, core needle biopsy:Benign prostate glands  No malignancy is identified  E  Prostate, right lateral apex, core needle biopsy: Benign prostate glands  No malignancy is identified  F  Prostate, right medial apex, core needle biopsy: Benign prostate glands  No malignancy is identified      G  Prostate, left lateral base, core needle biopsy:             - Prostatic adenocarcinoma, [place] score 3 + 4 = 7, Prognostic Grade Group II, discontinuously involving 60% of one core biopsy   Approximately 10% Tom [place] pattern 4                         H  Prostate, left medial, core needle biopsy: Prostatic adenocarcinoma, Faizan score 3 + 3 = 6, Prognostic Grade Group I, involving less than  5% of one disrupted core biopsy                I  Prostate, left lateral mid, core needle biopsy:             - Small focus of atypical prostate glands, suspicious for prostatic adenocarcinoma, involving less than 5% of one core biopsy      J  Prostate, left medial mid, core needle biopsy:             - Prostatic adenocarcinoma, [place] score 3 + 4 = 7, Prognostic Grade Group II, discontinuously involving 80% of one core biopsy  Approximately 20% Faizan pattern 4               K  Prostate, left lateral apex, core needle biopsy:             - Prostatic adenocarcinoma, [place] score 3 + 3 = 6, Prognostic Grade Group I, involving 5% of one core biopsy  L  Prostate, left medial apex, core needle biopsy:             - Prostatic adenocarcinoma, [place] 3 + 3 = 6, Prognostic Grade Group I, involving less than 5% of one core biopsy            9/23/2019 -  Cancer Staged    Staging form: Prostate, AJCC 8th Edition  - Clinical: Stage IIB (cT1c, cN0, cM0, PSA: 4 2, Grade Group: 2) - Signed by Nia Villa MD on 9/23/2019  Prostate specific antigen (PSA) range: Less than 10  Histologic grading system: 5 grade system  [place] score: 7       10/11/2019 -  Hormone Therapy    Firmagon on 10/11/19 and Lupron on 11/11/19 12/9/2019 - 2/11/2020 Radiation    7920cGy in 44 daily 180cGy fractions tot he prostate and proximal seminal vesicles         Clinical Trial: no    Health Maintenance   Topic Date Due    Annual Physical  07/09/1968    Influenza Vaccine  07/01/2020    Fall Risk  05/28/2021    Depression Screening PHQ  05/29/2021    BMI: Followup Plan  05/29/2021    BMI: Adult  07/28/2021    Colonoscopy Surveillance  07/28/2023    DTaP,Tdap,and Td Vaccines (2 - Td) 05/03/2026    Colorectal Cancer Screening  07/28/2030    Hepatitis C Screening  Completed    Pneumococcal Vaccine: 65+ Years  Completed    HIB Vaccine  Aged Out    Hepatitis B Vaccine  Aged Out    IPV Vaccine Aged Out    Hepatitis A Vaccine  Aged Out    Meningococcal ACWY Vaccine  Aged Out    HPV Vaccine  Aged Out       Patient Active Problem List   Diagnosis    Mixed hyperlipidemia    Prediabetes    [facility]    Encounter for monitoring androgen deprivation therapy     Past Medical History:   Diagnosis Date    Benign neoplasm of colon     [facility]     Prostate CA    Colon polyp     Colon, diverticulosis     ED (erectile dysfunction) of organic origin     Former tobacco use     Mixed hyperlipidemia     Obesity     Prediabetes      Past Surgical History:   Procedure Laterality Date    COLONOSCOPY  10/30/2014    COLONOSCOPY  2004    COLONOSCOPY      RADIOACTIVE PLAQUE INSERTION N/A 2019    Procedure: TISSUE MARKER INSERTION, SPACEOAR INSERTION;  Surgeon: Maegan Fonseca MD;  [facility];  Service: Urology    VENTRAL HERNIA REPAIR       Family History   Problem Relation Age of Onset    Heart disease Father         Cardiac pacemaker    Cancer Sister     Stroke Brother      Social History     Socioeconomic History    Marital status: /Civil Union     Spouse name: Not on file    Number of children: Not on file    Years of education: Not on file    Highest education level: Not on file   Occupational History    Not on file   Social Needs    Financial resource strain: Not on file    Food insecurity     Worry: Not on file     Inability: Not on file   Contractually needs     Medical: Not on file     Non-medical: Not on file   Tobacco Use    Smoking status: Former Smoker     Packs/day: 0 25     Years: 6 00     Pack years: 1 50     Types: Cigarettes     Last attempt to quit:      Years since quittin 6    Smokeless tobacco: Never Used   Substance and Sexual Activity    Alcohol use: Yes     Frequency: Monthly or less     Drinks per session: 1 or 2     Binge frequency: Never     Comment: socially    Drug use: No    Sexual activity: Yes Partners: Female   Lifestyle    Physical activity     Days per week: 7 days     Minutes per session: 10 min    Stress: Not at all   Relationships    Social connections     Talks on phone: Not on file     Gets together: Not on file     Attends [employer] service: Not on file     Active member of club or organization: Not on file     Attends meetings of clubs or organizations: Not on file     Relationship status: Not on file    Intimate partner violence     Fear of current or ex partner: Not on file     Emotionally abused: Not on file     Physically abused: Not on file     Forced sexual activity: Not on file   Other Topics Concern    Not on file   Social History Narrative    Not on file       Current Outpatient Medications:     Acetaminophen (TYLENOL) 325 MG CAPS, Take by mouth as needed , Disp: , Rfl:     ASPIRIN 81 PO, Take by mouth, Disp: , Rfl:     Multiple Vitamin (MULTIVITAMINS PO), Take by mouth daily , Disp: , Rfl:     XELPROS 0 005 % EMUL, INSTILL 1 DROP DAILY IN EACH EYE AS DIRECTED, Disp: , Rfl: 3    tamsulosin (FLOMAX) 0 4 mg, Take 1 capsule (0 4 mg total) by mouth every other day (Patient not taking: Reported on 7/17/2020), Disp: 30 capsule, Rfl: 0  No Known Allergies    Review of Systems:  Review of Systems   Constitutional: Negative  HENT: Negative  Eyes: Negative  Respiratory: Negative  Cardiovascular: Negative  Gastrointestinal: Negative  Endocrine: Negative  Genitourinary:        Nocturia x 3 or more   Musculoskeletal: Positive for arthralgias  Skin: Negative  Allergic/Immunologic: Negative  Neurological: Negative  Hematological: Negative  Psychiatric/Behavioral: Negative  Vitals:    09/02/20 0850   BP: 142/80   Pulse: 76   Resp: 16   Temp: (!) 97 °F (36 1 °C)   Weight: 109 kg (240 lb)   Height: 6' 3" (1 905 m)    Oxygen 97%    Pain Score: 0-No pain      Imaging:No results found

## 2020-12-04 ENCOUNTER — LAB (OUTPATIENT)
Dept: LAB | Facility: CLINIC | Age: 70
End: 2020-12-04
Payer: COMMERCIAL

## 2020-12-04 DIAGNOSIS — C61 PROSTATE CANCER (HCC): ICD-10-CM

## 2020-12-04 LAB
PSA SERPL-MCNC: 0.4 NG/ML (ref 0–4)
TESTOST SERPL-MCNC: 254 NG/DL (ref 95–948)

## 2020-12-04 PROCEDURE — 36415 COLL VENOUS BLD VENIPUNCTURE: CPT

## 2020-12-04 PROCEDURE — 84403 ASSAY OF TOTAL TESTOSTERONE: CPT

## 2020-12-04 PROCEDURE — 84153 ASSAY OF PSA TOTAL: CPT

## 2020-12-11 ENCOUNTER — RADIATION ONCOLOGY FOLLOW-UP (OUTPATIENT)
Dept: RADIATION ONCOLOGY | Facility: CLINIC | Age: 70
End: 2020-12-11
Attending: RADIOLOGY
Payer: COMMERCIAL

## 2020-12-11 VITALS
SYSTOLIC BLOOD PRESSURE: 142 MMHG | RESPIRATION RATE: 16 BRPM | TEMPERATURE: 97.8 F | HEIGHT: 75 IN | HEART RATE: 84 BPM | BODY MASS INDEX: 31.33 KG/M2 | DIASTOLIC BLOOD PRESSURE: 80 MMHG | WEIGHT: 252 LBS

## 2020-12-11 DIAGNOSIS — C61 PROSTATE CANCER (HCC): Primary | ICD-10-CM

## 2020-12-11 PROCEDURE — G0463 HOSPITAL OUTPT CLINIC VISIT: HCPCS | Performed by: RADIOLOGY

## 2020-12-11 PROCEDURE — 99211 OFF/OP EST MAY X REQ PHY/QHP: CPT | Performed by: RADIOLOGY

## 2021-01-28 ENCOUNTER — APPOINTMENT (OUTPATIENT)
Dept: LAB | Facility: CLINIC | Age: 71
End: 2021-01-28
Payer: COMMERCIAL

## 2021-01-28 ENCOUNTER — OFFICE VISIT (OUTPATIENT)
Dept: INTERNAL MEDICINE CLINIC | Facility: CLINIC | Age: 71
End: 2021-01-28
Payer: COMMERCIAL

## 2021-01-28 ENCOUNTER — TELEPHONE (OUTPATIENT)
Dept: INTERNAL MEDICINE CLINIC | Facility: CLINIC | Age: 71
End: 2021-01-28

## 2021-01-28 VITALS
HEART RATE: 66 BPM | HEIGHT: 72 IN | BODY MASS INDEX: 34.19 KG/M2 | TEMPERATURE: 97.3 F | SYSTOLIC BLOOD PRESSURE: 134 MMHG | WEIGHT: 252.4 LBS | OXYGEN SATURATION: 96 % | DIASTOLIC BLOOD PRESSURE: 90 MMHG

## 2021-01-28 DIAGNOSIS — Z00.00 ADULT GENERAL MEDICAL EXAMINATION: Primary | ICD-10-CM

## 2021-01-28 DIAGNOSIS — R73.03 PREDIABETES: Chronic | ICD-10-CM

## 2021-01-28 DIAGNOSIS — Z00.00 ADULT GENERAL MEDICAL EXAMINATION: ICD-10-CM

## 2021-01-28 DIAGNOSIS — E78.2 MIXED HYPERLIPIDEMIA: Chronic | ICD-10-CM

## 2021-01-28 DIAGNOSIS — C61 PROSTATE CANCER (HCC): ICD-10-CM

## 2021-01-28 PROBLEM — Z79.818 ENCOUNTER FOR MONITORING ANDROGEN DEPRIVATION THERAPY: Status: RESOLVED | Noted: 2019-10-11 | Resolved: 2021-01-28

## 2021-01-28 LAB
ANION GAP SERPL CALCULATED.3IONS-SCNC: 5 MMOL/L (ref 4–13)
BUN SERPL-MCNC: 21 MG/DL (ref 5–25)
CALCIUM SERPL-MCNC: 9.7 MG/DL (ref 8.3–10.1)
CHLORIDE SERPL-SCNC: 112 MMOL/L (ref 100–108)
CHOLEST SERPL-MCNC: 210 MG/DL (ref 50–200)
CO2 SERPL-SCNC: 25 MMOL/L (ref 21–32)
CREAT SERPL-MCNC: 1.25 MG/DL (ref 0.6–1.3)
ERYTHROCYTE [DISTWIDTH] IN BLOOD BY AUTOMATED COUNT: 14.9 % (ref 11.6–15.1)
EST. AVERAGE GLUCOSE BLD GHB EST-MCNC: 128 MG/DL
GFR SERPL CREATININE-BSD FRML MDRD: 67 ML/MIN/1.73SQ M
GLUCOSE P FAST SERPL-MCNC: 101 MG/DL (ref 65–99)
HBA1C MFR BLD: 6.1 %
HCT VFR BLD AUTO: 48 % (ref 36.5–49.3)
HDLC SERPL-MCNC: 62 MG/DL
HGB BLD-MCNC: 14.9 G/DL (ref 12–17)
LDLC SERPL CALC-MCNC: 129 MG/DL (ref 0–100)
MCH RBC QN AUTO: 26.4 PG (ref 26.8–34.3)
MCHC RBC AUTO-ENTMCNC: 31 G/DL (ref 31.4–37.4)
MCV RBC AUTO: 85 FL (ref 82–98)
NONHDLC SERPL-MCNC: 148 MG/DL
PLATELET # BLD AUTO: 271 THOUSANDS/UL (ref 149–390)
PMV BLD AUTO: 9.3 FL (ref 8.9–12.7)
POTASSIUM SERPL-SCNC: 4.1 MMOL/L (ref 3.5–5.3)
RBC # BLD AUTO: 5.64 MILLION/UL (ref 3.88–5.62)
SODIUM SERPL-SCNC: 142 MMOL/L (ref 136–145)
TRIGL SERPL-MCNC: 94 MG/DL
WBC # BLD AUTO: 4.94 THOUSAND/UL (ref 4.31–10.16)

## 2021-01-28 PROCEDURE — 36415 COLL VENOUS BLD VENIPUNCTURE: CPT

## 2021-01-28 PROCEDURE — 80061 LIPID PANEL: CPT

## 2021-01-28 PROCEDURE — 83036 HEMOGLOBIN GLYCOSYLATED A1C: CPT

## 2021-01-28 PROCEDURE — 85027 COMPLETE CBC AUTOMATED: CPT

## 2021-01-28 PROCEDURE — 80048 BASIC METABOLIC PNL TOTAL CA: CPT

## 2021-01-28 PROCEDURE — 99397 PER PM REEVAL EST PAT 65+ YR: CPT | Performed by: INTERNAL MEDICINE

## 2021-01-28 NOTE — PROGRESS NOTES
ADULT ANNUAL PHYSICAL   [address]    NAME: Robyn Kilgore  AGE: 79 y o  SEX: male  : 1950     DATE: 2021     Assessment and Plan:     Problem List Items Addressed This Visit        Genitourinary    Prostate cancer ([address] )       Other    Prediabetes (Chronic)    Relevant Orders    Hemoglobin A1C    Mixed hyperlipidemia (Chronic)    Relevant Orders    Lipid panel      Other Visit Diagnoses     Adult general medical examination    -  Primary    Relevant Orders    Basic metabolic panel    CBC        Health is stable  Check UTD labs  No evidence of disease in regards to prostate cancer  Followed by Rad Onc who has future diagnostic PSA ordered  Immunizations and preventive care screenings were discussed with patient today  Appropriate education was printed on patient's after visit summary  Counseling:  Alcohol/drug use: discussed moderation in alcohol intake, the recommendations for healthy alcohol use, and avoidance of illicit drug use  Dental Health: discussed importance of regular tooth brushing, flossing, and dental visits  Injury prevention: discussed safety/seat belts, safety helmets, smoke detectors, carbon dioxide detectors, and smoking near bedding or upholstery  · Sexual health: discussed sexually transmitted diseases, partner selection, use of condoms, avoidance of unintended pregnancy, and contraceptive alternatives  BMI Counseling: Body mass index is 34 71 kg/m²  The BMI is above normal  Nutrition recommendations include decreasing portion sizes, encouraging healthy choices of fruits and vegetables, limiting drinks that contain sugar, moderation in carbohydrate intake and increasing intake of lean protein  Exercise recommendations include exercising 3-5 times per week           Return in about 1 year (around 2022) for Annual Physical      Chief Complaint:     Chief Complaint   Patient presents with   Gavin Knowles Annual Exam finger stiffness      History of Present Illness:     Adult Annual Physical   Patient here for a comprehensive physical exam  The patient reports history of prostate cancer and finished course of radiation  Follows with rad onc and PSA stable at 0 4  No evidence of disease currently  Diet and Physical Activity  · Diet/Nutrition: poor diet  · Exercise: no formal exercise  Depression Screening  PHQ-9 Depression Screening    PHQ-9:   Frequency of the following problems over the past two weeks:      Little interest or pleasure in doing things: 0 - not at all  Feeling down, depressed, or hopeless: 0 - not at all  PHQ-2 Score: 0       General Health  · Sleep: sleeps well  · Hearing: normal - bilateral   · Vision: has glaucoma and follows with eye doctor regularly  · Dental: regular dental visits   Health  · Symptoms include: none     Review of Systems:     Review of Systems   Constitutional: Negative for appetite change, chills, fatigue and fever  HENT: Negative for congestion, hearing loss, postnasal drip, rhinorrhea, sore throat, tinnitus and trouble swallowing  Eyes: Negative for pain, discharge, redness and visual disturbance  Respiratory: Negative for cough, chest tightness, shortness of breath and wheezing  Cardiovascular: Negative for chest pain, palpitations and leg swelling  Gastrointestinal: Negative for abdominal distention, abdominal pain, blood in stool, constipation, diarrhea, nausea and vomiting  Endocrine: Negative for cold intolerance, heat intolerance, polydipsia, polyphagia and polyuria  Genitourinary: Negative for difficulty urinating, dysuria, frequency, hematuria and urgency  Musculoskeletal: Positive for arthralgias  Negative for back pain, gait problem, joint swelling, myalgias, neck pain and neck stiffness  Skin: Negative for color change and rash     Neurological: Negative for dizziness, tremors, seizures, syncope, speech difficulty, weakness, light-headedness, numbness and headaches  Hematological: Negative for adenopathy  Does not bruise/bleed easily  Psychiatric/Behavioral: Negative for agitation, behavioral problems, confusion, hallucinations, sleep disturbance and suicidal ideas  The patient is not nervous/anxious         Past Medical History:     Past Medical History:   Diagnosis Date    Benign neoplasm of colon     [facility] )     Prostate CA    Colon polyp     Colon, diverticulosis     ED (erectile dysfunction) of organic origin     Former tobacco use     Mixed hyperlipidemia     Obesity     Prediabetes       Past Surgical History:     Past Surgical History:   Procedure Laterality Date    COLONOSCOPY  10/30/2014    COLONOSCOPY      COLONOSCOPY      RADIOACTIVE PLAQUE INSERTION N/A 2019    Procedure: TISSUE MARKER INSERTION, SPACEOAR INSERTION;  Surgeon: Mareitta Zamora MD;  [facility];  Service: Urology    VENTRAL HERNIA REPAIR        Family History:     Family History   Problem Relation Age of Onset    Heart disease Father         Cardiac pacemaker    Cancer Sister     Stroke Brother       Social History:     E-Cigarette/Vaping    E-Cigarette Use Never User      E-Cigarette/Vaping Substances    Nicotine No     THC No     CBD No     Flavoring No     Other No     Unknown No      Social History     Socioeconomic History    Marital status: /Civil Union     Spouse name: None    Number of children: None    Years of education: None    Highest education level: None   Occupational History    None   Social Needs    Financial resource strain: None    Food insecurity     Worry: None     Inability: None    Transportation needs     Medical: None     Non-medical: None   Tobacco Use    Smoking status: Former Smoker     Packs/day: 0 25     Years: 6 00     Pack years: 1 50     Types: Cigarettes     Quit date:      Years since quittin 1    Smokeless tobacco: Never Used Substance and Sexual Activity    Alcohol use: Yes     Frequency: Monthly or less     Drinks per session: 1 or 2     Binge frequency: Never     Comment: socially    Drug use: No    Sexual activity: Yes     Partners: Female   Lifestyle    Physical activity     Days per week: 7 days     Minutes per session: 10 min    Stress: Not at all   Relationships    Social connections     Talks on phone: None     Gets together: None     Attends Religion service: None     Active member of club or organization: None     Attends meetings of clubs or organizations: None     Relationship status: None    Intimate partner violence     Fear of current or ex partner: None     Emotionally abused: None     Physically abused: None     Forced sexual activity: None   Other Topics Concern    None   Social History Narrative    None      Current Medications:     Current Outpatient Medications   Medication Sig Dispense Refill    Acetaminophen (TYLENOL) 325 MG CAPS Take by mouth as needed       ASPIRIN 81 PO Take by mouth      Multiple Vitamin (MULTIVITAMINS PO) Take by mouth daily       XELPROS 0 005 % EMUL INSTILL 1 DROP DAILY IN EACH EYE AS DIRECTED  3     No current facility-administered medications for this visit  Allergies:     No Known Allergies      Physical Exam:     /90   Pulse 66   Temp (!) 97 3 °F (36 3 °C) (Tympanic)   Ht 5' 11 5" (1 816 m)   Wt 114 kg (252 lb 6 4 oz)   SpO2 96%   BMI 34 71 kg/m²     Physical Exam  Vitals signs reviewed  Constitutional:       General: He is not in acute distress  Appearance: He is obese  He is not ill-appearing or toxic-appearing  HENT:      Head: Normocephalic and atraumatic  Right Ear: Tympanic membrane, ear canal and external ear normal  There is no impacted cerumen  Left Ear: Tympanic membrane, ear canal and external ear normal  There is no impacted cerumen  Nose: Nose normal  No congestion or rhinorrhea        Mouth/Throat:      Mouth: Mucous membranes are moist       Pharynx: No oropharyngeal exudate or posterior oropharyngeal erythema  Eyes:      General: No scleral icterus  Right eye: No discharge  Left eye: No discharge  Extraocular Movements: Extraocular movements intact  Conjunctiva/sclera: Conjunctivae normal       Pupils: Pupils are equal, round, and reactive to light  Neck:      Musculoskeletal: Neck supple  No muscular tenderness  Vascular: No carotid bruit  Cardiovascular:      Rate and Rhythm: Normal rate and regular rhythm  Heart sounds: No murmur  Pulmonary:      Effort: Pulmonary effort is normal  No respiratory distress  Breath sounds: Normal breath sounds  No wheezing or rales  Abdominal:      General: Abdomen is protuberant  Bowel sounds are normal  There is no distension  Palpations: Abdomen is soft  Tenderness: There is no abdominal tenderness  Hernia: No hernia is present  Musculoskeletal:         General: Deformity (knee crepitus) present  No tenderness  Right lower leg: No edema  Left lower leg: No edema  Lymphadenopathy:      Cervical: No cervical adenopathy  Skin:     General: Skin is warm and dry  Findings: No rash  Neurological:      General: No focal deficit present  Mental Status: He is alert and oriented to person, place, and time  Cranial Nerves: No cranial nerve deficit  Sensory: No sensory deficit  Motor: No weakness        Coordination: Coordination normal       Gait: Gait normal    Psychiatric:         Mood and Affect: Mood normal          Behavior: Behavior normal           [facility] [id number]

## 2021-01-28 NOTE — TELEPHONE ENCOUNTER
----- Message from Maurilio Morales DO sent at 1/28/2021  3:36 PM EST -----  Call patient and let him know that his lab work is stable from 1 year ago   Still has evidence of prediabetes

## 2021-01-28 NOTE — PATIENT INSTRUCTIONS

## 2021-03-04 DIAGNOSIS — Z23 ENCOUNTER FOR IMMUNIZATION: ICD-10-CM

## 2021-03-12 ENCOUNTER — IMMUNIZATIONS (OUTPATIENT)
Dept: FAMILY MEDICINE CLINIC | Facility: HOSPITAL | Age: 71
End: 2021-03-12

## 2021-03-12 DIAGNOSIS — Z23 ENCOUNTER FOR IMMUNIZATION: Primary | ICD-10-CM

## 2021-03-12 PROCEDURE — 0001A SARS-COV-2 / COVID-19 MRNA VACCINE (PFIZER-BIONTECH) 30 MCG: CPT

## 2021-03-12 PROCEDURE — 91300 SARS-COV-2 / COVID-19 MRNA VACCINE (PFIZER-BIONTECH) 30 MCG: CPT

## 2021-04-02 ENCOUNTER — IMMUNIZATIONS (OUTPATIENT)
Dept: FAMILY MEDICINE CLINIC | Facility: HOSPITAL | Age: 71
End: 2021-04-02

## 2021-04-02 DIAGNOSIS — Z23 ENCOUNTER FOR IMMUNIZATION: Primary | ICD-10-CM

## 2021-04-02 PROCEDURE — 0002A SARS-COV-2 / COVID-19 MRNA VACCINE (PFIZER-BIONTECH) 30 MCG: CPT

## 2021-04-02 PROCEDURE — 91300 SARS-COV-2 / COVID-19 MRNA VACCINE (PFIZER-BIONTECH) 30 MCG: CPT

## 2021-06-11 ENCOUNTER — APPOINTMENT (OUTPATIENT)
Dept: LAB | Facility: CLINIC | Age: 71
End: 2021-06-11
Payer: COMMERCIAL

## 2021-06-11 DIAGNOSIS — C61 PROSTATE CANCER (HCC): ICD-10-CM

## 2021-06-11 LAB — PSA SERPL-MCNC: 0.3 NG/ML (ref 0–4)

## 2021-06-11 PROCEDURE — 84153 ASSAY OF PSA TOTAL: CPT

## 2021-06-16 ENCOUNTER — RADIATION ONCOLOGY FOLLOW-UP (OUTPATIENT)
Dept: RADIATION ONCOLOGY | Facility: CLINIC | Age: 71
End: 2021-06-16
Attending: RADIOLOGY
Payer: COMMERCIAL

## 2021-06-16 VITALS
RESPIRATION RATE: 18 BRPM | HEIGHT: 75 IN | SYSTOLIC BLOOD PRESSURE: 142 MMHG | BODY MASS INDEX: 30.84 KG/M2 | WEIGHT: 248 LBS | DIASTOLIC BLOOD PRESSURE: 84 MMHG | HEART RATE: 75 BPM

## 2021-06-16 DIAGNOSIS — C61 PROSTATE CANCER (HCC): Primary | ICD-10-CM

## 2021-06-16 PROCEDURE — 99211 OFF/OP EST MAY X REQ PHY/QHP: CPT | Performed by: RADIOLOGY

## 2021-06-16 PROCEDURE — G0463 HOSPITAL OUTPT CLINIC VISIT: HCPCS | Performed by: RADIOLOGY

## 2021-06-16 NOTE — PROGRESS NOTES
Lauryn Ortiz 1950 is a 79 y o  male     Follow up visit   Lauryn Ortiz is a 68-year-old man and [employer] with a history of clinical stage T1c prostatic adenocarcinoma, Faizan score 7 (3+4) with pretreatment PSA of 4 2ng/mL  He completed a course of definitive radiation with concurrent 6 months of ADT     Returns for follow up post prostate radiation completed on 2/11/20  He was last seen in follow up on 12/11/20    Lab Results  Component Value Date   PSA 0 3 06/11/2021   PSA 0 4 12/04/2020   PSA 0 4 06/05/2020     3/5/20 Last Dr Arlene Raines [facility]      Oncology History Overview Note         Prostate cancer ([facility] [address] )   8/23/2019 Initial Diagnosis    Prostate cancer ([facility] [address] )     8/23/2019 Biopsy    Final Diagnosis  A  Prostate, right lateral base, core needle biopsy: Benign prostate glands  No malignancy is identified  B  Prostate, right medial base, core needle biopsy: Benign prostate glands  No malignancy is identified  C  Prostate, right lateral mid, core needle biopsy: Focal high-grade PIN  No prostatic adenocarcinoma is identified  D  Prostate, right medial mid, core needle biopsy:Benign prostate glands  No malignancy is identified  E  Prostate, right lateral apex, core needle biopsy: Benign prostate glands  No malignancy is identified  F  Prostate, right medial apex, core needle biopsy: Benign prostate glands  No malignancy is identified      G  Prostate, left lateral base, core needle biopsy:             - Prostatic adenocarcinoma, Faizan score 3 + 4 = 7, Prognostic Grade Group II, discontinuously involving 60% of one core biopsy   Approximately 10% [place] pattern 4                         H  Prostate, left medial, core needle biopsy: Prostatic adenocarcinoma, [place] score 3 + 3 = 6, Prognostic Grade Group I, involving less than  5% of one disrupted core biopsy                I  Prostate, left lateral mid, core needle biopsy:             - Small focus of atypical prostate glands, suspicious for prostatic adenocarcinoma, involving less than 5% of one core biopsy      J  Prostate, left medial mid, core needle biopsy:             - Prostatic adenocarcinoma, Faizan score 3 + 4 = 7, Prognostic Grade Group II, discontinuously involving 80% of one core biopsy  Approximately 20% [place] pattern 4               K  Prostate, left lateral apex, core needle biopsy:             - Prostatic adenocarcinoma, Faizan score 3 + 3 = 6, Prognostic Grade Group I, involving 5% of one core biopsy  L  Prostate, left medial apex, core needle biopsy:             - Prostatic adenocarcinoma, Faizan 3 + 3 = 6, Prognostic Grade Group I, involving less than 5% of one core biopsy            9/23/2019 -  Cancer Staged    Staging form: Prostate, AJCC 8th Edition  - Clinical: Stage IIB (cT1c, cN0, cM0, PSA: 4 2, Grade Group: 2) - Signed by Nia Villa MD on 9/23/2019  Prostate specific antigen (PSA) range: Less than 10  Histologic grading system: 5 grade system  Faizan score: 7       10/11/2019 -  Hormone Therapy    Firmagon on 10/11/19 and Lupron on 11/11/19 12/9/2019 - 2/11/2020 Radiation    7920cGy in 44 daily 180cGy fractions tot he prostate and proximal seminal vesicles         Clinical Trial: no    Health Maintenance   Topic Date Due    Fall Risk  01/28/2022    BMI: Followup Plan  01/28/2022    BMI: Adult  01/28/2022    Annual Physical  01/28/2022    Depression Screening PHQ  06/16/2022    Colorectal Cancer Screening  07/28/2025    DTaP,Tdap,and Td Vaccines (2 - Td or Tdap) 05/03/2026    Hepatitis C Screening  Completed    Pneumococcal Vaccine: 65+ Years  Completed    Influenza Vaccine  Completed    COVID-19 Vaccine  Completed    HIB Vaccine  Aged Out    Hepatitis B Vaccine  Aged Out    IPV Vaccine  Aged Out    Hepatitis A Vaccine  Aged Out    Meningococcal ACWY Vaccine  Aged Out    HPV Vaccine  Aged Out       Patient Active Problem List   Diagnosis    Mixed hyperlipidemia    Prediabetes    Prostate cancer [facility])     Past Medical History:   Diagnosis Date    Benign neoplasm of colon     Cancer ([address] )     Prostate CA    Colon polyp     Colon, diverticulosis     ED (erectile dysfunction) of organic origin     Former tobacco use     Mixed hyperlipidemia     Obesity     Prediabetes      Past Surgical History:   Procedure Laterality Date    COLONOSCOPY  10/30/2014    COLONOSCOPY  2004    COLONOSCOPY      RADIOACTIVE PLAQUE INSERTION N/A 2019    Procedure: TISSUE MARKER INSERTION, SPACEOAR INSERTION;  Surgeon: Janes Barron MD;  [facility];  Service: Urology    VENTRAL HERNIA REPAIR       Family History   Problem Relation Age of Onset    Heart disease Father         Cardiac pacemaker    Cancer Sister     Stroke Brother      Social History     Socioeconomic History    Marital status: /Civil Union     Spouse name: Not on file    Number of children: Not on file    Years of education: Not on file    Highest education level: Not on file   Occupational History    Not on file   Tobacco Use    Smoking status: Former Smoker     Packs/day: 0 25     Years: 6 00     Pack years: 1 50     Types: Cigarettes     Quit date:      Years since quittin 4    Smokeless tobacco: Never Used   Vaping Use    Vaping Use: Never used   Substance and Sexual Activity    Alcohol use: Yes     Comment: socially    Drug use: No    Sexual activity: Yes     Partners: Female   Other Topics Concern    Not on file   Social History Narrative    Not on file     Social Determinants of Health     Financial Resource Strain:     Difficulty of Paying Living Expenses:    Food Insecurity:     Worried About Running Out of Food in the Last Year:     [address] in the Last Year:    Transportation Needs:     Lack of Transportation (Medical):      Lack of Transportation (Non-Medical):    Physical Activity:     Days of Exercise per Week:     Minutes of Exercise per Session: Stress:     Feeling of Stress :    Social Connections:     Frequency of Communication with Friends and Family:     Frequency of Social Gatherings with Friends and Family:     Attends Gnosticist Services:     Active Member of Clubs or Organizations:     Attends Club or Organization Meetings:     Marital Status:    Intimate Partner Violence:     Fear of Current or Ex-Partner:     Emotionally Abused:     Physically Abused:     Sexually Abused:        Current Outpatient Medications:     Acetaminophen (TYLENOL) 325 MG CAPS, Take by mouth as needed , Disp: , Rfl:     ASPIRIN 81 PO, Take by mouth, Disp: , Rfl:     Multiple Vitamin (MULTIVITAMINS PO), Take by mouth daily , Disp: , Rfl:     XELPROS 0 005 % EMUL, INSTILL 1 DROP DAILY IN EACH EYE AS DIRECTED, Disp: , Rfl: 3  No Known Allergies    Review of Systems:  Review of Systems   Constitutional: Negative  HENT: Negative  Eyes: Negative  Respiratory: Negative  Cardiovascular: Negative  Gastrointestinal: Negative  Endocrine: Negative  Genitourinary: Negative  Musculoskeletal: Negative  Allergic/Immunologic: Negative  Neurological: Negative  Hematological: Negative  Psychiatric/Behavioral: Negative  Vitals:    06/16/21 0932   BP: 142/84   Pulse: 75   Resp: 18   Weight: 112 kg (248 lb)   Height: 6' 3" (1 905 m)        Pain Score: 0-No pain    IPSS Questionnaire (AUA-7): Over the past month    1)  How often have you had a sensation of not emptying your bladder completely after you finish urinating? 1 - Less than 1 time in 5   2)  How often have you had to urinate again less than two hours after you finished urinating? 3 - About half the time   3)  How often have you found you stopped and started again several times when you urinated? 0 - Not at all   4) How difficult have you found it to postpone urination?   1 - Less than 1 time in 5   5) How often have you had a weak urinary stream?  0 - Not at all   6) How often have you had to push or strain to begin urination? 0 - Not at all   7) How many times did you most typically get up to urinate from the time you went to bed until the time you got up in the morning? 3 - 3 times   Total Score:  8         Imaging:No results found

## 2021-06-16 NOTE — PROGRESS NOTES
Follow-up - Radiation Oncology   Arlin Allen 1950 79 y o  male [id number]      History of Present Illness   Cancer Staging  Prostate cancer [facility])  Staging form: Prostate, AJCC 8th Edition  - Clinical: Stage IIB (cT1c, cN0, cM0, PSA: 4 2, Grade Group: 2) - Signed by Adonis James MD on 9/23/2019  Prostate specific antigen (PSA) range: Less than 10  Gregory score: 7  Histologic grading system: 5 grade system      Arlin Allen is a 79y o  year old male and [place] [id number] witness with a history of clinical stage T1c prostatic adenocarcinoma, Gregory score 7 (3+4) with pretreatment PSA of 4 2ng/mL  He completed a course of definitive radiation with concurrent 6 months of ADT on 2/11/20  Zenovia Beers Lupron injection was on 11/11/2019  Today, he returns for follow-up evaluation         The patient states that he generally feels well   Hot flashes have resolved  He denies chest tenderness   He denies any significant urinary symptoms including hematuria, dysuria, or urinary incontinence   He has baseline nocturia 3 times per night   He is able to empty his bladder without difficulty   He denies any diarrhea or rectal bleeding  He notes drier ejaculate again on today's visit  He has not seen Urology in follow-up since March 2020  Lab Results  Component     Value   Date              PSA     0 3       06/11/2021              PSA     0 4       12/04/2020              PSA     0 4       06/05/2020       Historical Information   Oncology History Overview Note         Prostate cancer ([facility] [address] )   8/23/2019 Initial Diagnosis    Prostate cancer ([facility] [address] )     8/23/2019 Biopsy    Final Diagnosis  A  Prostate, right lateral base, core needle biopsy: Benign prostate glands  No malignancy is identified  B  Prostate, right medial base, core needle biopsy: Benign prostate glands  No malignancy is identified  C  Prostate, right lateral mid, core needle biopsy: Focal high-grade PIN  No prostatic adenocarcinoma is identified    D  Prostate, right medial mid, core needle biopsy:Benign prostate glands  No malignancy is identified  E  Prostate, right lateral apex, core needle biopsy: Benign prostate glands  No malignancy is identified  F  Prostate, right medial apex, core needle biopsy: Benign prostate glands  No malignancy is identified      G  Prostate, left lateral base, core needle biopsy:             - Prostatic adenocarcinoma, [place] score 3 + 4 = 7, Prognostic Grade Group II, discontinuously involving 60% of one core biopsy  Approximately 10% Faizan pattern 4                         H  Prostate, left medial, core needle biopsy: Prostatic adenocarcinoma, [place] score 3 + 3 = 6, Prognostic Grade Group I, involving less than  5% of one disrupted core biopsy                I  Prostate, left lateral mid, core needle biopsy:             - Small focus of atypical prostate glands, suspicious for prostatic adenocarcinoma, involving less than 5% of one core biopsy      J  Prostate, left medial mid, core needle biopsy:             - Prostatic adenocarcinoma, Faizan score 3 + 4 = 7, Prognostic Grade Group II, discontinuously involving 80% of one core biopsy  Approximately 20% [place] pattern 4               K  Prostate, left lateral apex, core needle biopsy:             - Prostatic adenocarcinoma, Faizan score 3 + 3 = 6, Prognostic Grade Group I, involving 5% of one core biopsy  L  Prostate, left medial apex, core needle biopsy:             - Prostatic adenocarcinoma, Faizan 3 + 3 = 6, Prognostic Grade Group I, involving less than 5% of one core biopsy            9/23/2019 -  Cancer Staged    Staging form: Prostate, AJCC 8th Edition  - Clinical: Stage IIB (cT1c, cN0, cM0, PSA: 4 2, Grade Group: 2) - Signed by Ally Moreno MD on 9/23/2019  Prostate specific antigen (PSA) range: Less than 10  Histologic grading system: 5 grade system  [place] score: 7       10/11/2019 -  Hormone Therapy    Firmagon on 10/11/19 and Lupron on 11/11/19 12/9/2019 - 2020 Radiation    7920cGy in 44 daily 180cGy fractions tot he prostate and proximal seminal vesicles         Past Medical History:   Diagnosis Date    Benign neoplasm of colon     Cancer ([address] )     Prostate CA    Colon polyp     Colon, diverticulosis     ED (erectile dysfunction) of organic origin     Former tobacco use     Mixed hyperlipidemia     Obesity     Prediabetes      Past Surgical History:   Procedure Laterality Date    COLONOSCOPY  10/30/2014    COLONOSCOPY  2004    COLONOSCOPY      RADIOACTIVE PLAQUE INSERTION N/A 2019    Procedure: TISSUE MARKER INSERTION, SPACEOAR INSERTION;  Surgeon: Cynthia Perez MD;  [facility];  Service: Urology    VENTRAL HERNIA REPAIR         Social History   Social History     Substance and Sexual Activity   Alcohol Use Yes    Comment: socially     Social History     Substance and Sexual Activity   Drug Use No     Social History     Tobacco Use   Smoking Status Former Smoker    Packs/day: 0 25    Years: 6 00    Pack years: 1 50    Types: Cigarettes    Quit date: 1    Years since quittin 4   Smokeless Tobacco Never Used       Meds/Allergies     Current Outpatient Medications:     Acetaminophen (TYLENOL) 325 MG CAPS, Take by mouth as needed , Disp: , Rfl:     ASPIRIN 81 PO, Take by mouth, Disp: , Rfl:     Multiple Vitamin (MULTIVITAMINS PO), Take by mouth daily , Disp: , Rfl:     XELPROS 0 005 % EMUL, INSTILL 1 DROP DAILY IN EACH EYE AS DIRECTED, Disp: , Rfl: 3  No Known Allergies      Review of Systems   Constitutional: Negative  HENT: Negative  Eyes: Negative  Respiratory: Negative  Cardiovascular: Negative  Gastrointestinal: Negative  Endocrine: Negative  Genitourinary: Negative  Musculoskeletal: Negative  Allergic/Immunologic: Negative  Neurological: Negative  Hematological: Negative  Psychiatric/Behavioral: Negative         OBJECTIVE:   /84   Pulse 75   Resp 18   Ht 6' 3" (1 905 m)   Wt 112 kg (248 lb)   BMI 31 00 kg/m²   Karnofsky: 90 - Able to carry on normal activity; minor signs or symptoms of disease     Physical Exam  Vitals and nursing note reviewed  Constitutional:       General: He is not in acute distress  Appearance: He is well-developed  Eyes:      General: No scleral icterus  Cardiovascular:      Rate and Rhythm: Normal rate and regular rhythm  Heart sounds: Normal heart sounds  No murmur heard  Pulmonary:      Effort: Pulmonary effort is normal  No respiratory distress  Breath sounds: No wheezing, rhonchi or rales  Abdominal:      General: There is no distension  Palpations: Abdomen is soft  Tenderness: There is no abdominal tenderness  There is no right CVA tenderness or left CVA tenderness  Genitourinary:     Comments: Digital rectal examination demonstrates normal external sphincter tone  The prostate is smooth, flat, without palpable nodularity  There is no blood seen on the examiner's finger  Musculoskeletal:         General: No tenderness (no spinal tenderness to percussion)  Right lower leg: No edema  Left lower leg: No edema  Lymphadenopathy:      Cervical: No cervical adenopathy  Upper Body:      Right upper body: No supraclavicular adenopathy  Left upper body: No supraclavicular adenopathy  Lower Body: No right inguinal adenopathy  No left inguinal adenopathy  Neurological:      Mental Status: He is alert and oriented to person, place, and time        Gait: Gait normal            RESULTS    Lab Results:   Recent Results (from the past 672 hour(s))   PSA Total, Diagnostic    Collection Time: 06/11/21  7:46 AM   Result Value Ref Range    PSA, Diagnostic 0 3 0 0 - 4 0 ng/mL         Assessment/Plan:  Orders Placed This Encounter   Procedures    PSA Total, Diagnostic        Sheila Bergman is a 79y o  year old male with and Christa Kelli witness with a [place] a history of intermediate risk prostatic adenocarcinoma status post definitive radiation with concurrent 6 months of ADT  He is currently clinically and biochemically MARITA     We discussed that dry ejaculate is likely a side effect from radiation treatment to prostate and is not dangerous, but likely permanent  I recommended contacting Urology for routine surveillance follow-up as well  I have asked that he return for follow-up in 6 months  We will order PSA prior to follow-up  We will see her sooner if the need arises  Cher Neri MD  6/16/2021,10:12 AM    Portions of the record may have been created with voice recognition software   Occasional wrong word or "sound a like" substitutions may have occurred due to the inherent limitations of voice recognition software   Read the chart carefully and recognize, using context, where substitutions have occurred

## 2021-08-18 ENCOUNTER — OFFICE VISIT (OUTPATIENT)
Dept: UROLOGY | Facility: CLINIC | Age: 71
End: 2021-08-18
Payer: COMMERCIAL

## 2021-08-18 VITALS
HEART RATE: 84 BPM | BODY MASS INDEX: 32.78 KG/M2 | SYSTOLIC BLOOD PRESSURE: 122 MMHG | DIASTOLIC BLOOD PRESSURE: 84 MMHG | HEIGHT: 72 IN | WEIGHT: 242 LBS

## 2021-08-18 DIAGNOSIS — C61 PROSTATE CANCER (HCC): Primary | ICD-10-CM

## 2021-08-18 PROCEDURE — 99213 OFFICE O/P EST LOW 20 MIN: CPT | Performed by: PHYSICIAN ASSISTANT

## 2021-08-18 NOTE — PROGRESS NOTES
8/18/2021      Chief Complaint   Patient presents with    Follow-up    Prostate Cancer     Assessment and Plan    1  Faizan 7 (3+4) prostate cancer   - T1c, pretreatment PSA of 4 2   - S/p definitive radiation with concurrent 6 months of ADT on 2/11/20  Last Lupron injection was on 11/11/19    - PSA from 6/11/21 was stable at 0 3    - F/u in 6 months with PSA prior to visit  Will f/u with radiation oncology in the interim  History of Present Illness  Amara Hutchison is a 70 y o  male here for follow up evaluation of  Prostate cancer  He is status post definitive radiation with concurrent 6 months of ADT on 02/11/2020  His last Lupron injection was on 11/11/2019  Prior to radiation a SpaceOAR and fiducial markers were placed  His most recent PSA from 06/11/2021 remains stable at 0 3  He denies any urinary complaints, incontinence, or hematuria  He does report dry ejaculations however is able to achieve erections and denies painful erections or dysorgasmia  Review of Systems   Constitutional: Negative for chills and fever  Respiratory: Negative for shortness of breath  Cardiovascular: Negative for chest pain  Gastrointestinal: Negative for abdominal pain, constipation, diarrhea, nausea and vomiting  Genitourinary: Negative for difficulty urinating, dysuria, flank pain, frequency, hematuria, penile pain and urgency  Neurological: Negative for dizziness                    Past Medical History  Past Medical History:   Diagnosis Date    Benign neoplasm of colon     Cancer ([address] )     Prostate CA    Colon polyp     Colon, diverticulosis     ED (erectile dysfunction) of organic origin     Former tobacco use     Mixed hyperlipidemia     Obesity     Prediabetes        Past Social History  Past Surgical History:   Procedure Laterality Date    COLONOSCOPY  10/30/2014    COLONOSCOPY  2004    COLONOSCOPY  2006    RADIOACTIVE PLAQUE INSERTION N/A 11/19/2019    Procedure: TISSUE MARKER INSERTION, Carlyle Frank;  Surgeon: Isaac Hogan MD;  [facility];  Service: Urology    VENTRAL HERNIA REPAIR       Social History     Tobacco Use   Smoking Status Former Smoker    Packs/day: 0 25    Years: 6 00    Pack years: 1 50    Types: Cigarettes    Quit date: 1    Years since quittin 6   Smokeless Tobacco Never Used       Past Family History  Family History   Problem Relation Age of Onset    Heart disease Father         Cardiac pacemaker    Cancer Sister     Stroke Brother        Past Social history  Social History     Socioeconomic History    Marital status: /Civil Union     Spouse name: Not on file    Number of children: Not on file    Years of education: Not on file    Highest education level: Not on file   Occupational History    Not on file   Tobacco Use    Smoking status: Former Smoker     Packs/day: 0 25     Years: 6 00     Pack years: 1 50     Types: Cigarettes     Quit date:      Years since quittin 6    Smokeless tobacco: Never Used   Vaping Use    Vaping Use: Never used   Substance and Sexual Activity    Alcohol use: Yes     Comment: socially    Drug use: No    Sexual activity: Yes     Partners: Female   Other Topics Concern    Not on file   Social History Narrative    Not on file     Social Determinants of Health     Financial Resource Strain:     Difficulty of Paying Living Expenses:    Food Insecurity:     Worried About [address] in the Last Year:     [facility] in the Last Year:    Transportation Needs:     Lack of Transportation (Medical):      Lack of Transportation (Non-Medical):    Physical Activity:     Days of Exercise per Week:     Minutes of Exercise per Session:    Stress:     Feeling of Stress :    Social Connections:     Frequency of Communication with Friends and Family:     Frequency of Social Gatherings with Friends and Family:     Attends Latter-day Services:     Active Member of Clubs or Organizations:     Attends Club or Organization Meetings:     Marital Status:    Intimate Partner Violence:     Fear of Current or Ex-Partner:     Emotionally Abused:     Physically Abused:     Sexually Abused:        Current Medications  Current Outpatient Medications   Medication Sig Dispense Refill    Acetaminophen (TYLENOL) 325 MG CAPS Take by mouth as needed       ASPIRIN 81 PO Take by mouth      Multiple Vitamin (MULTIVITAMINS PO) Take by mouth daily       XELPROS 0 005 % EMUL INSTILL 1 DROP DAILY IN EACH EYE AS DIRECTED  3     No current facility-administered medications for this visit  Allergies  No Known Allergies      The following portions of the patient's history were reviewed and updated as appropriate: allergies, current medications, past medical history, past social history, past surgical history and problem list       Vitals  There were no vitals filed for this visit  Physical Exam  Physical Exam  Constitutional:       Appearance: Normal appearance  HENT:      Head: Normocephalic and atraumatic  Right Ear: External ear normal       Left Ear: External ear normal    Eyes:      General: No scleral icterus  Conjunctiva/sclera: Conjunctivae normal    Cardiovascular:      Pulses: Normal pulses  Pulmonary:      Effort: Pulmonary effort is normal    Musculoskeletal:         General: Normal range of motion  Cervical back: Normal range of motion  Neurological:      General: No focal deficit present  Mental Status: He is alert and oriented to person, place, and time  Psychiatric:         Mood and Affect: Mood normal          Behavior: Behavior normal          Thought Content:  Thought content normal          Judgment: Judgment normal            Results  No results found for this or any previous visit (from the past 1 hour(s)) ]  Lab Results   Component Value Date    PSA 0 3 06/11/2021    PSA 0 4 12/04/2020    PSA 0 4 06/05/2020     Lab Results   Component Value Date    CALCIUM 9 7 01/28/2021    K 4 1 01/28/2021    CO2 25 01/28/2021     (H) 01/28/2021    BUN 21 01/28/2021    CREATININE 1 25 01/28/2021     Lab Results   Component Value Date    WBC 4 94 01/28/2021    HGB 14 9 01/28/2021    HCT 48 0 01/28/2021    MCV 85 01/28/2021     01/28/2021           Orders  No orders of the defined types were placed in this encounter        Nika Payton PA-C

## 2021-09-03 ENCOUNTER — IMMUNIZATIONS (OUTPATIENT)
Dept: FAMILY MEDICINE CLINIC | Facility: HOSPITAL | Age: 71
End: 2021-09-03

## 2021-09-03 DIAGNOSIS — Z23 ENCOUNTER FOR IMMUNIZATION: Primary | ICD-10-CM

## 2021-09-03 PROCEDURE — 91300 SARS-COV-2 / COVID-19 MRNA VACCINE (PFIZER-BIONTECH) 30 MCG: CPT

## 2021-09-03 PROCEDURE — 0001A SARS-COV-2 / COVID-19 MRNA VACCINE (PFIZER-BIONTECH) 30 MCG: CPT

## 2021-11-30 ENCOUNTER — APPOINTMENT (OUTPATIENT)
Dept: LAB | Facility: CLINIC | Age: 71
End: 2021-11-30
Payer: COMMERCIAL

## 2021-11-30 DIAGNOSIS — C61 PROSTATE CANCER (HCC): ICD-10-CM

## 2021-11-30 LAB — PSA SERPL-MCNC: 0.3 NG/ML (ref 0–4)

## 2021-11-30 PROCEDURE — 84153 ASSAY OF PSA TOTAL: CPT

## 2021-12-10 ENCOUNTER — CLINICAL SUPPORT (OUTPATIENT)
Dept: RADIATION ONCOLOGY | Facility: CLINIC | Age: 71
End: 2021-12-10
Attending: RADIOLOGY
Payer: COMMERCIAL

## 2021-12-10 VITALS
RESPIRATION RATE: 16 BRPM | SYSTOLIC BLOOD PRESSURE: 144 MMHG | DIASTOLIC BLOOD PRESSURE: 82 MMHG | OXYGEN SATURATION: 95 % | WEIGHT: 245 LBS | TEMPERATURE: 97.5 F | HEART RATE: 81 BPM | BODY MASS INDEX: 33.69 KG/M2

## 2021-12-10 DIAGNOSIS — C61 PROSTATE CANCER (HCC): Primary | ICD-10-CM

## 2021-12-10 PROCEDURE — 99211 OFF/OP EST MAY X REQ PHY/QHP: CPT | Performed by: RADIOLOGY

## 2021-12-10 PROCEDURE — 99213 OFFICE O/P EST LOW 20 MIN: CPT | Performed by: RADIOLOGY

## 2021-12-10 PROCEDURE — G0463 HOSPITAL OUTPT CLINIC VISIT: HCPCS | Performed by: RADIOLOGY

## 2022-02-09 ENCOUNTER — OFFICE VISIT (OUTPATIENT)
Dept: INTERNAL MEDICINE CLINIC | Facility: CLINIC | Age: 72
End: 2022-02-09
Payer: COMMERCIAL

## 2022-02-09 ENCOUNTER — APPOINTMENT (OUTPATIENT)
Dept: LAB | Facility: CLINIC | Age: 72
End: 2022-02-09
Payer: COMMERCIAL

## 2022-02-09 VITALS
HEIGHT: 72 IN | SYSTOLIC BLOOD PRESSURE: 110 MMHG | TEMPERATURE: 97.7 F | OXYGEN SATURATION: 98 % | WEIGHT: 246.2 LBS | BODY MASS INDEX: 33.35 KG/M2 | DIASTOLIC BLOOD PRESSURE: 70 MMHG | HEART RATE: 86 BPM

## 2022-02-09 DIAGNOSIS — E78.2 MIXED HYPERLIPIDEMIA: ICD-10-CM

## 2022-02-09 DIAGNOSIS — Z00.00 ADULT GENERAL MEDICAL EXAMINATION: ICD-10-CM

## 2022-02-09 DIAGNOSIS — R73.03 PREDIABETES: ICD-10-CM

## 2022-02-09 DIAGNOSIS — Z00.00 ADULT GENERAL MEDICAL EXAMINATION: Primary | ICD-10-CM

## 2022-02-09 DIAGNOSIS — C61 PROSTATE CANCER (HCC): ICD-10-CM

## 2022-02-09 LAB
ALBUMIN SERPL BCP-MCNC: 4 G/DL (ref 3.5–5)
ALP SERPL-CCNC: 52 U/L (ref 46–116)
ALT SERPL W P-5'-P-CCNC: 34 U/L (ref 12–78)
ANION GAP SERPL CALCULATED.3IONS-SCNC: 5 MMOL/L (ref 4–13)
AST SERPL W P-5'-P-CCNC: 18 U/L (ref 5–45)
BASOPHILS # BLD AUTO: 0.05 THOUSANDS/ΜL (ref 0–0.1)
BASOPHILS NFR BLD AUTO: 1 % (ref 0–1)
BILIRUB SERPL-MCNC: 0.36 MG/DL (ref 0.2–1)
BUN SERPL-MCNC: 13 MG/DL (ref 5–25)
CALCIUM SERPL-MCNC: 9.8 MG/DL (ref 8.3–10.1)
CHLORIDE SERPL-SCNC: 109 MMOL/L (ref 100–108)
CHOLEST SERPL-MCNC: 196 MG/DL
CO2 SERPL-SCNC: 25 MMOL/L (ref 21–32)
CREAT SERPL-MCNC: 1.28 MG/DL (ref 0.6–1.3)
EOSINOPHIL # BLD AUTO: 0.22 THOUSAND/ΜL (ref 0–0.61)
EOSINOPHIL NFR BLD AUTO: 5 % (ref 0–6)
ERYTHROCYTE [DISTWIDTH] IN BLOOD BY AUTOMATED COUNT: 15.2 % (ref 11.6–15.1)
EST. AVERAGE GLUCOSE BLD GHB EST-MCNC: 126 MG/DL
GFR SERPL CREATININE-BSD FRML MDRD: 55 ML/MIN/1.73SQ M
GLUCOSE P FAST SERPL-MCNC: 101 MG/DL (ref 65–99)
HBA1C MFR BLD: 6 %
HCT VFR BLD AUTO: 50.9 % (ref 36.5–49.3)
HDLC SERPL-MCNC: 58 MG/DL
HGB BLD-MCNC: 16 G/DL (ref 12–17)
IMM GRANULOCYTES # BLD AUTO: 0.01 THOUSAND/UL (ref 0–0.2)
IMM GRANULOCYTES NFR BLD AUTO: 0 % (ref 0–2)
LDLC SERPL CALC-MCNC: 117 MG/DL (ref 0–100)
LYMPHOCYTES # BLD AUTO: 1.62 THOUSANDS/ΜL (ref 0.6–4.47)
LYMPHOCYTES NFR BLD AUTO: 36 % (ref 14–44)
MCH RBC QN AUTO: 26.7 PG (ref 26.8–34.3)
MCHC RBC AUTO-ENTMCNC: 31.4 G/DL (ref 31.4–37.4)
MCV RBC AUTO: 85 FL (ref 82–98)
MONOCYTES # BLD AUTO: 0.39 THOUSAND/ΜL (ref 0.17–1.22)
MONOCYTES NFR BLD AUTO: 9 % (ref 4–12)
NEUTROPHILS # BLD AUTO: 2.27 THOUSANDS/ΜL (ref 1.85–7.62)
NEUTS SEG NFR BLD AUTO: 49 % (ref 43–75)
NRBC BLD AUTO-RTO: 0 /100 WBCS
PLATELET # BLD AUTO: 248 THOUSANDS/UL (ref 149–390)
PMV BLD AUTO: 9.4 FL (ref 8.9–12.7)
POTASSIUM SERPL-SCNC: 4.3 MMOL/L (ref 3.5–5.3)
PROT SERPL-MCNC: 8.1 G/DL (ref 6.4–8.2)
RBC # BLD AUTO: 6 MILLION/UL (ref 3.88–5.62)
SODIUM SERPL-SCNC: 139 MMOL/L (ref 136–145)
TRIGL SERPL-MCNC: 107 MG/DL
TSH SERPL DL<=0.05 MIU/L-ACNC: 2.09 UIU/ML (ref 0.36–3.74)
WBC # BLD AUTO: 4.56 THOUSAND/UL (ref 4.31–10.16)

## 2022-02-09 PROCEDURE — 1101F PT FALLS ASSESS-DOCD LE1/YR: CPT

## 2022-02-09 PROCEDURE — 3288F FALL RISK ASSESSMENT DOCD: CPT

## 2022-02-09 PROCEDURE — 85025 COMPLETE CBC W/AUTO DIFF WBC: CPT

## 2022-02-09 PROCEDURE — 80053 COMPREHEN METABOLIC PANEL: CPT

## 2022-02-09 PROCEDURE — 80061 LIPID PANEL: CPT

## 2022-02-09 PROCEDURE — 84443 ASSAY THYROID STIM HORMONE: CPT

## 2022-02-09 PROCEDURE — 3725F SCREEN DEPRESSION PERFORMED: CPT

## 2022-02-09 PROCEDURE — 99397 PER PM REEVAL EST PAT 65+ YR: CPT

## 2022-02-09 PROCEDURE — 83036 HEMOGLOBIN GLYCOSYLATED A1C: CPT

## 2022-02-09 PROCEDURE — 36415 COLL VENOUS BLD VENIPUNCTURE: CPT

## 2022-02-09 NOTE — PROGRESS NOTES
ADULT ANNUAL PHYSICAL   [facility]    NAME: Tiffany Homans  AGE: 70 y o  SEX: male  : 1950     DATE: 2022     Assessment and Plan:     Problem List Items Addressed This Visit        Genitourinary    Prostate cancer ([address] )       Other    Mixed hyperlipidemia (Chronic)     Obtain current lipid panel  Encouraged heart healthy diet and exercise  Relevant Orders    Lipid Panel with Direct LDL reflex (Completed)    Prediabetes (Chronic)     Repeat hemoglobin A1c  Patient encouraged to follow a diet that is low to moderate carbohydrate intake  He was counseled on what carbohydrates are  Also encouraged to increase green leafy vegetables, healthy fats  Also counseled on the importance of exercise both cardiovascular and strength training  Relevant Orders    Comprehensive metabolic panel (Completed)    Hemoglobin A1C (Completed)    TSH, 3rd generation with Free T4 reflex (Completed)      Other Visit Diagnoses     Adult general medical examination    -  Primary    Relevant Orders    CBC and differential (Completed)    Comprehensive metabolic panel (Completed)          Immunizations and preventive care screenings were discussed with patient today  Appropriate education was printed on patient's after visit summary  Counseling:  Alcohol/drug use: discussed moderation in alcohol intake, the recommendations for healthy alcohol use, and avoidance of illicit drug use  Dental Health: discussed importance of regular tooth brushing, flossing, and dental visits  Injury prevention: discussed safety/seat belts, safety helmets, smoke detectors, carbon dioxide detectors, and smoking near bedding or upholstery  Sexual health: discussed sexually transmitted diseases, partner selection, use of condoms, avoidance of unintended pregnancy, and contraceptive alternatives    · Exercise: the importance of regular exercise/physical activity was discussed  Recommend exercise 3-5 times per week for at least 30 minutes  Return in about 6 months (around 8/9/2022) for Recheck  Chief Complaint:     Chief Complaint   Patient presents with    Physical Exam      History of Present Illness:     Adult Annual Physical   Patient here for a comprehensive physical exam  The patient reports no problems  Diet and Physical Activity  · Diet/Nutrition: well balanced diet, limited junk food and consuming 3-5 servings of fruits/vegetables daily  · Exercise: no formal exercise  Depression Screening  PHQ-2/9 Depression Screening    Little interest or pleasure in doing things: 0 - not at all  Feeling down, depressed, or hopeless: 0 - not at all  PHQ-2 Score: 0  PHQ-2 Interpretation: Negative depression screen       General Health  · Sleep: gets 4-6 hours of sleep on average  · Hearing: normal - bilateral   · Vision: no vision problems and goes for regular eye exams  · Dental: regular dental visits and brushes teeth twice daily   Health  · Symptoms include: erectile dysfunction     Review of Systems:     Review of Systems   Constitutional: Negative for chills, fatigue and fever  HENT: Negative for congestion, hearing loss, postnasal drip and sore throat  Eyes: Negative for visual disturbance  Respiratory: Positive for wheezing (occasional)  Negative for cough and shortness of breath  Cardiovascular: Negative for chest pain, palpitations and leg swelling  Gastrointestinal: Negative for abdominal pain, blood in stool, constipation, diarrhea, nausea and vomiting  Genitourinary: Negative for dysuria, frequency and hematuria  Musculoskeletal: Positive for neck stiffness (occasional)  Negative for arthralgias, back pain, joint swelling and myalgias  Skin: Negative for color change, rash and wound  Neurological: Negative for dizziness, syncope and headaches  Psychiatric/Behavioral: Negative for sleep disturbance and suicidal ideas  All other systems reviewed and are negative       Past Medical History:     Past Medical History:   Diagnosis Date    Benign neoplasm of colon     Cancer ([address] )     Prostate CA    Colon polyp     Colon, diverticulosis     ED (erectile dysfunction) of organic origin     Former tobacco use     Mixed hyperlipidemia     Obesity     Prediabetes       Past Surgical History:     Past Surgical History:   Procedure Laterality Date    COLONOSCOPY  10/30/2014    COLONOSCOPY      COLONOSCOPY      RADIOACTIVE PLAQUE INSERTION N/A 2019    Procedure: TISSUE MARKER INSERTION, SPACEOAR INSERTION;  Surgeon: Eusebia Aden MD;  [facility];  Service: Urology    VENTRAL HERNIA REPAIR        Family History:     Family History   Problem Relation Age of Onset    Heart disease Father         Cardiac pacemaker    Cancer Sister     Stroke Brother       Social History:     Social History     Socioeconomic History    Marital status: /Civil Union     Spouse name: None    Number of children: None    Years of education: None    Highest education level: None   Occupational History    None   Tobacco Use    Smoking status: Former Smoker     Packs/day: 0 25     Years: 6 00     Pack years: 1 50     Types: Cigarettes     Quit date:      Years since quittin 1    Smokeless tobacco: Never Used   Vaping Use    Vaping Use: Never used   Substance and Sexual Activity    Alcohol use: Yes     Comment: socially    Drug use: No    Sexual activity: Yes     Partners: Female   Other Topics Concern    None   Social History Narrative    None     Social Determinants of Health     Financial Resource Strain: Not on file   Food Insecurity: Not on file   Transportation Needs: Not on file   Physical Activity: Not on file   Stress: Not on file   Social Connections: Not on file   Intimate Partner Violence: Not on file   Housing Stability: Not on file      Current Medications:     Current Outpatient Medications   Medication Sig Dispense Refill    Acetaminophen (TYLENOL) 325 MG CAPS Take by mouth as needed       ASPIRIN 81 PO Take by mouth      Multiple Vitamin (MULTIVITAMINS PO) Take by mouth daily       XELPROS 0 005 % EMUL INSTILL 1 DROP DAILY IN EACH EYE AS DIRECTED  3     No current facility-administered medications for this visit  Allergies:     No Known Allergies   Physical Exam:     /70   Pulse 86   Temp 97 7 °F (36 5 °C)   Ht 5' 11 5" (1 816 m)   Wt 112 kg (246 lb 3 2 oz)   SpO2 98%   BMI 33 86 kg/m²     Physical Exam  Vitals and nursing note reviewed  Constitutional:       General: He is not in acute distress  Appearance: He is well-developed  He is obese  HENT:      Head: Normocephalic and atraumatic  Right Ear: Tympanic membrane normal       Left Ear: Tympanic membrane normal       Nose: Nose normal  No congestion  Mouth/Throat:      Pharynx: Oropharynx is clear  Eyes:      Conjunctiva/sclera: Conjunctivae normal    Neck:      Thyroid: No thyromegaly  Cardiovascular:      Rate and Rhythm: Normal rate and regular rhythm  Heart sounds: No murmur heard  Pulmonary:      Effort: Pulmonary effort is normal  No respiratory distress  Breath sounds: Normal breath sounds  Abdominal:      General: Bowel sounds are normal       Palpations: Abdomen is soft  Tenderness: There is no abdominal tenderness  Musculoskeletal:         General: Normal range of motion  Cervical back: Normal range of motion and neck supple  No rigidity  Lymphadenopathy:      Cervical: No cervical adenopathy  Skin:     General: Skin is warm and dry  Capillary Refill: Capillary refill takes less than 2 seconds  Neurological:      General: No focal deficit present  Mental Status: He is alert and oriented to person, place, and time  Psychiatric:         Mood and Affect: Mood normal          Behavior: Behavior normal          Thought Content:  Thought content normal          Judgment: Judgment normal           Sanjuana Cain, [address] [place] [address]

## 2022-02-09 NOTE — PATIENT INSTRUCTIONS
Cerumen Impaction   WHAT YOU NEED TO KNOW:   Cerumen impaction is the blockage of the outer ear canal by tightly packed cerumen (earwax)  It is generally treated with procedures such as flushing or suctioning the ear canal or the use of instruments to remove the impaction  DISCHARGE INSTRUCTIONS:   Medicines:  · Ear drops: These are used to soften the wax in your ear  Wax softening ear drops may be bought without a prescription  Ask your healthcare provider how often you should use this medicine  Read the instructions carefully before you use the ear drops  Do the following when you put in ear drops:     ? Warm the drops by holding the bottle in your hands for a few minutes  Cold ear drops may make you dizzy  ? Lie down with the affected ear toward the ceiling  You may also stand with your head tilted to one side  ? Pull your ear lobe up and back, and place the correct number of drops into the ear  ? Keep your ear facing up for 5 to 10 minutes so the drops coat the outer ear canal      ? Gently clean the outer part of the ear with a cotton swab  Do not  place the cotton swab or anything inside your ear canal  This increases the risk of damaging your eardrum  · Take your medicine as directed  Contact your healthcare provider if you think your medicine is not helping or if you have side effects  Tell him of her if you are allergic to any medicine  Keep a list of the medicines, vitamins, and herbs you take  Include the amounts, and when and why you take them  Bring the list or the pill bottles to follow-up visits  Carry your medicine list with you in case of an emergency  Follow up with your healthcare provider as directed:  Write down your questions so you remember to ask them during your visits  Contact your healthcare provider if:   · You have a fever  · You have trouble hearing or ringing in your ear  · You have questions about your condition or care      Return to the emergency department if:   · You feel dizzy  · You have discharge or blood coming out of your ear  · Your ear pain does not go away or gets worse  © Copyright [employer] 2018 Information is for End User's use only and may not be sold, redistributed or otherwise used for commercial purposes  All illustrations and images included in CareNotes® are the copyrighted property of [employer] D [employer] M , Inc  or [facility]   The above information is an  only  It is not intended as medical advice for individual conditions or treatments  Talk to your doctor, nurse or pharmacist before following any medical regimen to see if it is safe and effective for you

## 2022-02-12 NOTE — ASSESSMENT & PLAN NOTE
Repeat hemoglobin A1c  Patient encouraged to follow a diet that is low to moderate carbohydrate intake  He was counseled on what carbohydrates are  Also encouraged to increase green leafy vegetables, healthy fats  Also counseled on the importance of exercise both cardiovascular and strength training

## 2022-02-15 NOTE — PROGRESS NOTES
2/16/2022      Chief Complaint   Patient presents with    Prostate Cancer    Elevated PSA     Assessment and Plan    1  Bosque 7 (3+4) prostate cancer   - T1c, pretreatment PSA of 4 2   - S/p definitive radiation with concurrent 6 months of ADT on 2/11/20  Last Lupron injection was on 11/11/19    - Most recent PSA from 11/30/21 is stable at 0 3  - F/u in 6 months with repeat PSA  He continues to follow with radiation oncology as well       History of Present Illness  Royal Gomez is a 70 y o  male here for follow up evaluation of  Prostate cancer  He is status post definitive radiation with concurrent 6 months of ADT on 02/11/2020  His last Lupron injection was on 11/11/2019  Prior to radiation a SpaceOAR and fiducial markers were placed  His PSA remains stable at 0 3  He is doing well and denies any urinary complaints or issues since treatment  Review of Systems   Constitutional: Negative for chills and fever  Respiratory: Negative for shortness of breath  Cardiovascular: Negative for chest pain  Gastrointestinal: Negative for abdominal pain  Genitourinary: Negative for difficulty urinating, dysuria, flank pain, frequency, hematuria and urgency  Neurological: Negative for dizziness                    Past Medical History  Past Medical History:   Diagnosis Date    Benign neoplasm of colon     Cancer ([address] )     Prostate CA    Colon polyp     Colon, diverticulosis     ED (erectile dysfunction) of organic origin     Former tobacco use     Mixed hyperlipidemia     Obesity     Prediabetes        Past Social History  Past Surgical History:   Procedure Laterality Date    COLONOSCOPY  10/30/2014    COLONOSCOPY  2004    COLONOSCOPY  2006    RADIOACTIVE PLAQUE INSERTION N/A 11/19/2019    Procedure: TISSUE MARKER INSERTION, SPACEOAR INSERTION;  Surgeon: Elisa Yanez MD;  [facility];  Service: Urology    VENTRAL HERNIA REPAIR       Social History     Tobacco Use   Smoking Status Former Smoker    Packs/day: 0 25    Years: 6 00    Pack years: 1 50    Types: Cigarettes    Quit date: 1    Years since quittin 1   Smokeless Tobacco Never Used       Past Family History  Family History   Problem Relation Age of Onset    Heart disease Father         Cardiac pacemaker    Cancer Sister     Stroke Brother        Past Social history  Social History     Socioeconomic History    Marital status: /Civil Union     Spouse name: Not on file    Number of children: Not on file    Years of education: Not on file    Highest education level: Not on file   Occupational History    Not on file   Tobacco Use    Smoking status: Former Smoker     Packs/day: 0 25     Years: 6 00     Pack years: 1 50     Types: Cigarettes     Quit date:      Years since quittin 1    Smokeless tobacco: Never Used   Vaping Use    Vaping Use: Never used   Substance and Sexual Activity    Alcohol use: Yes     Comment: socially    Drug use: No    Sexual activity: Yes     Partners: Female   Other Topics Concern    Not on file   Social History Narrative    Not on file     Social Determinants of Health     Financial Resource Strain: Not on file   Food Insecurity: Not on file   Transportation Needs: Not on file   Physical Activity: Not on file   Stress: Not on file   Social Connections: Not on file   Intimate Partner Violence: Not on file   Housing Stability: Not on file       Current Medications  Current Outpatient Medications   Medication Sig Dispense Refill    Acetaminophen (TYLENOL) 325 MG CAPS Take by mouth as needed       ASPIRIN 81 PO Take by mouth      Multiple Vitamin (MULTIVITAMINS PO) Take by mouth daily       XELPROS 0 005 % EMUL INSTILL 1 DROP DAILY IN EACH EYE AS DIRECTED  3     No current facility-administered medications for this visit         Allergies  No Known Allergies      The following portions of the patient's history were reviewed and updated as appropriate: allergies, current medications, past medical history, past social history, past surgical history and problem list       Vitals  Vitals:    02/16/22 0808   BP: 116/88   Pulse: 83   SpO2: 94%   Weight: 110 kg (242 lb)   Height: 5' 11 5" (1 816 m)           Physical Exam  Physical Exam  Constitutional:       Appearance: Normal appearance  HENT:      Head: Normocephalic and atraumatic  Right Ear: External ear normal       Left Ear: External ear normal    Eyes:      General: No scleral icterus  Conjunctiva/sclera: Conjunctivae normal    Cardiovascular:      Pulses: Normal pulses  Pulmonary:      Effort: Pulmonary effort is normal    Musculoskeletal:         General: Normal range of motion  Cervical back: Normal range of motion  Neurological:      General: No focal deficit present  Mental Status: He is alert and oriented to person, place, and time  Psychiatric:         Mood and Affect: Mood normal          Behavior: Behavior normal          Thought Content:  Thought content normal          Judgment: Judgment normal            Results  No results found for this or any previous visit (from the past 1 hour(s)) ]  Lab Results   Component Value Date    PSA 0 3 11/30/2021    PSA 0 3 06/11/2021    PSA 0 4 12/04/2020     Lab Results   Component Value Date    CALCIUM 9 8 02/09/2022    K 4 3 02/09/2022    CO2 25 02/09/2022     (H) 02/09/2022    BUN 13 02/09/2022    CREATININE 1 28 02/09/2022     Lab Results   Component Value Date    WBC 4 56 02/09/2022    HGB 16 0 02/09/2022    HCT 50 9 (H) 02/09/2022    MCV 85 02/09/2022     02/09/2022           Orders  Orders Placed This Encounter   Procedures    PSA Total, Diagnostic     Standing Status:   Future     Standing Expiration Date:   2/16/2023     Tatyana Rodriguez PA-C

## 2022-02-16 ENCOUNTER — OFFICE VISIT (OUTPATIENT)
Dept: UROLOGY | Facility: CLINIC | Age: 72
End: 2022-02-16
Payer: COMMERCIAL

## 2022-02-16 VITALS
WEIGHT: 242 LBS | HEART RATE: 83 BPM | BODY MASS INDEX: 32.78 KG/M2 | DIASTOLIC BLOOD PRESSURE: 88 MMHG | SYSTOLIC BLOOD PRESSURE: 116 MMHG | OXYGEN SATURATION: 94 % | HEIGHT: 72 IN

## 2022-02-16 DIAGNOSIS — C61 PROSTATE CANCER (HCC): Primary | ICD-10-CM

## 2022-02-16 PROCEDURE — 3008F BODY MASS INDEX DOCD: CPT | Performed by: PHYSICIAN ASSISTANT

## 2022-02-16 PROCEDURE — 1160F RVW MEDS BY RX/DR IN RCRD: CPT | Performed by: PHYSICIAN ASSISTANT

## 2022-02-16 PROCEDURE — 99213 OFFICE O/P EST LOW 20 MIN: CPT | Performed by: PHYSICIAN ASSISTANT

## 2022-03-31 NOTE — TELEPHONE ENCOUNTER
9/6/19 at 2pm   Please let patient know that discussion appointments are scheduled for the end of the shift 
Called phone number on account and it stated that this phone number is no longer in service   I have mailed appointment card to address on account in hopes that patient will receive appointment that way
LMOM w apt details for 9/6/19 asked PT to pls cb to confirm
Pls assist Return in about 2 weeks (around 9/6/2019) for Dr Carl Jaimes, discussion visit, prostate biopsy results   PT prefers early not willing to travel to [facility] thank you
Wife called back to confirm appointment 
No

## 2022-04-06 ENCOUNTER — OFFICE (OUTPATIENT)
Dept: URBAN - METROPOLITAN AREA CLINIC 67 | Facility: CLINIC | Age: 72
End: 2022-04-06
Payer: COMMERCIAL

## 2022-04-06 VITALS
DIASTOLIC BLOOD PRESSURE: 76 MMHG | WEIGHT: 179 LBS | SYSTOLIC BLOOD PRESSURE: 140 MMHG | HEIGHT: 71 IN | OXYGEN SATURATION: 98 % | HEART RATE: 70 BPM

## 2022-04-06 DIAGNOSIS — Z85.528 PERSONAL HISTORY OF OTHER MALIGNANT NEOPLASM OF KIDNEY: ICD-10-CM

## 2022-04-06 DIAGNOSIS — Z86.010 PERSONAL HISTORY OF COLONIC POLYPS: ICD-10-CM

## 2022-04-06 PROCEDURE — 99203 OFFICE O/P NEW LOW 30 MIN: CPT | Performed by: SPECIALIST

## 2022-04-06 RX ORDER — SODIUM SULFATE, POTASSIUM SULFATE, MAGNESIUM SULFATE 17.5; 3.13; 1.6 G/ML; G/ML; G/ML
SOLUTION, CONCENTRATE ORAL
Qty: 1 | Refills: 0 | Status: ACTIVE
Start: 2022-04-06

## 2022-04-06 NOTE — SERVICEHPINOTES
History of adenoma colon polyps.  Last colon 2015.  right kidney cancer post nephrectomy.  rx chemo.  intermittent diarrhea post Inlyta therapy.  denies hematochezia, abd pain.  f/b dr. franklin > onc.    history of PE in 2021.

## 2022-05-27 ENCOUNTER — APPOINTMENT (OUTPATIENT)
Dept: LAB | Facility: CLINIC | Age: 72
End: 2022-05-27
Payer: COMMERCIAL

## 2022-05-27 LAB — PSA SERPL-MCNC: 0.3 NG/ML (ref 0–4)

## 2022-05-27 PROCEDURE — 84153 ASSAY OF PSA TOTAL: CPT

## 2022-06-10 ENCOUNTER — RADIATION ONCOLOGY FOLLOW-UP (OUTPATIENT)
Dept: RADIATION ONCOLOGY | Facility: CLINIC | Age: 72
End: 2022-06-10
Attending: RADIOLOGY
Payer: COMMERCIAL

## 2022-06-10 VITALS
HEART RATE: 71 BPM | DIASTOLIC BLOOD PRESSURE: 86 MMHG | SYSTOLIC BLOOD PRESSURE: 130 MMHG | WEIGHT: 237 LBS | BODY MASS INDEX: 32.59 KG/M2 | OXYGEN SATURATION: 99 % | RESPIRATION RATE: 16 BRPM | TEMPERATURE: 97 F

## 2022-06-10 DIAGNOSIS — C61 PROSTATE CANCER (HCC): Primary | ICD-10-CM

## 2022-06-10 PROCEDURE — G0463 HOSPITAL OUTPT CLINIC VISIT: HCPCS | Performed by: RADIOLOGY

## 2022-06-10 PROCEDURE — 99211 OFF/OP EST MAY X REQ PHY/QHP: CPT | Performed by: RADIOLOGY

## 2022-06-10 PROCEDURE — 99213 OFFICE O/P EST LOW 20 MIN: CPT | Performed by: RADIOLOGY

## 2022-06-10 NOTE — PROGRESS NOTES
Manish Gregory 1950 is a 70 y o  male with a history of clinical stage T1c prostatic adenocarcinoma, [place] score 7 (3+4) with pretreatment PSA of 4 2ng/mL  He completed a course of definitive radiation with concurrent 6 months of ADT on 20  He was last seen 12/10/21 and presents today for follow up      22 Urology  F/u in 6 months with repeat PSA    Component      Latest Ref Rng & Units 2021   PSA, Total      0 0 - 4 0 ng/mL 0 4 0 3 0 3 0 3     Upcomin22 Urology          Follow up visit     Oncology History   Prostate cancer ([facility] [address] )   2019 Initial Diagnosis    Prostate cancer ([facility] [address] )     2019 Biopsy    Final Diagnosis  A  Prostate, right lateral base, core needle biopsy: Benign prostate glands  No malignancy is identified  B  Prostate, right medial base, core needle biopsy: Benign prostate glands  No malignancy is identified  C  Prostate, right lateral mid, core needle biopsy: Focal high-grade PIN  No prostatic adenocarcinoma is identified  D  Prostate, right medial mid, core needle biopsy:Benign prostate glands  No malignancy is identified  E  Prostate, right lateral apex, core needle biopsy: Benign prostate glands  No malignancy is identified  F  Prostate, right medial apex, core needle biopsy: Benign prostate glands  No malignancy is identified      G  Prostate, left lateral base, core needle biopsy:             - Prostatic adenocarcinoma, Faizan score 3 + 4 = 7, Prognostic Grade Group II, discontinuously involving 60% of one core biopsy   Approximately 10% Cheswold pattern 4                         H  Prostate, left medial, core needle biopsy: Prostatic adenocarcinoma, Cheswold score 3 + 3 = 6, Prognostic Grade Group I, involving less than  5% of one disrupted core biopsy                I  Prostate, left lateral mid, core needle biopsy:             - Small focus of atypical prostate glands, suspicious for prostatic adenocarcinoma, involving less than 5% of one core biopsy      J  Prostate, left medial mid, core needle biopsy:             - Prostatic adenocarcinoma, [place] score 3 + 4 = 7, Prognostic Grade Group II, discontinuously involving 80% of one core biopsy  Approximately 20% [place] pattern 4               K  Prostate, left lateral apex, core needle biopsy:             - Prostatic adenocarcinoma, Faizan score 3 + 3 = 6, Prognostic Grade Group I, involving 5% of one core biopsy  L  Prostate, left medial apex, core needle biopsy:             - Prostatic adenocarcinoma, Faizan 3 + 3 = 6, Prognostic Grade Group I, involving less than 5% of one core biopsy  9/23/2019 -  Cancer Staged    Staging form: Prostate, AJCC 8th Edition  - Clinical: Stage IIB (cT1c, cN0, cM0, PSA: 4 2, Grade Group: 2) - Signed by Manish Kim MD on 9/23/2019  Prostate specific antigen (PSA) range: Less than 10  Histologic grading system: 5 grade system  Faizan score: 7       10/11/2019 -  Hormone Therapy    Firmagon on 10/11/19 and Lupron on 11/11/19 12/9/2019 - 2/11/2020 Radiation    7920cGy in 44 daily 180cGy fractions tot he prostate and proximal seminal vesicles         Review of Systems:  Review of Systems   Constitutional: Negative  HENT: Positive for dental problem (dental bridge)  Eyes:        Dry eye  Wears glasses   Respiratory: Negative  Cardiovascular: Negative  Gastrointestinal: Negative  Endocrine: Negative  Genitourinary:        Nocturia: 3 times/ night   Musculoskeletal: Positive for neck stiffness (sometimes)  Skin: Negative  Allergic/Immunologic: Negative  Neurological: Negative  Hematological: Negative  Psychiatric/Behavioral: Negative  Clinical Trial: no    IPSS Questionnaire (AUA-7): Over the past month    1)  How often have you had a sensation of not emptying your bladder completely after you finish urinating?   0 - Not at all   2)  How often have you had to urinate again less than two hours after you finished urinating? 1 - Less than 1 time in 5   3)  How often have you found you stopped and started again several times when you urinated? 0 - Not at all   4) How difficult have you found it to postpone urination? 0 - Not at all   5) How often have you had a weak urinary stream?  0 - Not at all   6) How often have you had to push or strain to begin urination? 0 - Not at all   7) How many times did you most typically get up to urinate from the time you went to bed until the time you got up in the morning?   3 - 3 times   Total Score:  4           Covid Vaccine Status yes + 2 boosters    Health Maintenance   Topic Date Due    BMI: Followup Plan  01/28/2022    Fall Risk  02/09/2023    Depression Screening  02/09/2023    Annual Physical  02/09/2023    BMI: Adult  02/16/2023    Colorectal Cancer Screening  07/28/2025    DTaP,Tdap,and Td Vaccines (2 - Td or Tdap) 05/03/2026    Hepatitis C Screening  Completed    Pneumococcal Vaccine: 65+ Years  Completed    Influenza Vaccine  Completed    COVID-19 Vaccine  Completed    HIB Vaccine  Aged Out    Hepatitis B Vaccine  Aged Out    IPV Vaccine  Aged Out    Hepatitis A Vaccine  Aged Out    Meningococcal ACWY Vaccine  Aged Out    HPV Vaccine  Aged Out     Patient Active Problem List   Diagnosis    Mixed hyperlipidemia    Prediabetes    Prostate cancer ([facility] [address] )     Past Medical History:   Diagnosis Date    Benign neoplasm of colon     Cancer ([facility] [address] )     Prostate CA    Colon polyp     Colon, diverticulosis     ED (erectile dysfunction) of organic origin     Former tobacco use     Mixed hyperlipidemia     Obesity     Prediabetes      Past Surgical History:   Procedure Laterality Date    COLONOSCOPY  10/30/2014    COLONOSCOPY  2004    COLONOSCOPY  2006    RADIOACTIVE PLAQUE INSERTION N/A 11/19/2019    Procedure: TISSUE MARKER INSERTION, SPACEOAR INSERTION;  Surgeon: Sybil Louis MD;  [facility];  Service: Urology  VENTRAL HERNIA REPAIR       Family History   Problem Relation Age of Onset    Heart disease Father         Cardiac pacemaker    Cancer Sister     Stroke Brother      Social History     Socioeconomic History    Marital status: /Civil Union     Spouse name: Not on file    Number of children: Not on file    Years of education: Not on file    Highest education level: Not on file   Occupational History    Not on file   Tobacco Use    Smoking status: Former Smoker     Packs/day: 0 25     Years: 6 00     Pack years: 1 50     Types: Cigarettes     Quit date:      Years since quittin 4    Smokeless tobacco: Never Used   Vaping Use    Vaping Use: Never used   Substance and Sexual Activity    Alcohol use: Yes     Comment: socially    Drug use: No    Sexual activity: Yes     Partners: Female   Other Topics Concern    Not on file   Social History Narrative    Not on file     Social Determinants of Health     Financial Resource Strain: Not on file   Food Insecurity: Not on file   Transportation Needs: Not on file   Physical Activity: Not on file   Stress: Not on file   Social Connections: Not on file   Intimate Partner Violence: Not on file   Housing Stability: Not on file       Current Outpatient Medications:     Acetaminophen (TYLENOL) 325 MG CAPS, Take by mouth as needed , Disp: , Rfl:     ASPIRIN 81 PO, Take by mouth, Disp: , Rfl:     Multiple Vitamin (MULTIVITAMINS PO), Take by mouth daily , Disp: , Rfl:     XELPROS 0 005 % EMUL, INSTILL 1 DROP DAILY IN EACH EYE AS DIRECTED, Disp: , Rfl: 3  No Known Allergies  There were no vitals filed for this visit

## 2022-06-10 NOTE — PROGRESS NOTES
Follow-up - Radiation Oncology   Chidi Martinez 1950 70 y o  male [id number]      History of Present Illness   Cancer Staging  Prostate cancer [facility])  Staging form: Prostate, AJCC 8th Edition  - Clinical: Stage IIB (cT1c, cN0, cM0, PSA: 4 2, Grade Group: 2) - Signed by Lobito Allison MD on 2019  Prostate specific antigen (PSA) range: Less than 10  Faizan score: 7  Histologic grading system: 5 grade system      Chidi Martinez is a 70y o  year old male with a history of clinical stage T1c prostatic adenocarcinoma, Faizan score 7 (3+4) with pretreatment PSA of 4 2ng/mL   He completed a course of definitive radiation with concurrent 6 months of ADT on 20  He presents today for follow up  The patient offers no new  or GI complaints  He denies significant urinary symptoms including hematuria, dysuria, or urinary incontinence   He has baseline nocturia 3 times per night  IPSS is 4 today   [employer] is able to empty his bladder without difficulty   He denies any diarrhea or rectal bleeding       22 Urology  F/u in 6 months with repeat PSA     Component      Latest Ref Rng & Units 2021   PSA, Total      0 0 - 4 0 ng/mL 0 4 0 3 0 3 0 3      Upcomin22 Urology          Historical Information   Oncology History   Prostate cancer ([facility] )   2019 Initial Diagnosis    Prostate cancer ([facility] )     2019 Biopsy    Final Diagnosis  A  Prostate, right lateral base, core needle biopsy: Benign prostate glands  No malignancy is identified  B  Prostate, right medial base, core needle biopsy: Benign prostate glands  No malignancy is identified  C  Prostate, right lateral mid, core needle biopsy: Focal high-grade PIN  No prostatic adenocarcinoma is identified  D  Prostate, right medial mid, core needle biopsy:Benign prostate glands  No malignancy is identified  E  Prostate, right lateral apex, core needle biopsy: Benign prostate glands  No malignancy is identified    F  Prostate, right medial apex, core needle biopsy: Benign prostate glands  No malignancy is identified      G  Prostate, left lateral base, core needle biopsy:             - Prostatic adenocarcinoma, Faizan score 3 + 4 = 7, Prognostic Grade Group II, discontinuously involving 60% of one core biopsy  Approximately 10% Faizan pattern 4                         H  Prostate, left medial, core needle biopsy: Prostatic adenocarcinoma, Faizan score 3 + 3 = 6, Prognostic Grade Group I, involving less than  5% of one disrupted core biopsy                I  Prostate, left lateral mid, core needle biopsy:             - Small focus of atypical prostate glands, suspicious for prostatic adenocarcinoma, involving less than 5% of one core biopsy      J  Prostate, left medial mid, core needle biopsy:             - Prostatic adenocarcinoma, Faizan score 3 + 4 = 7, Prognostic Grade Group II, discontinuously involving 80% of one core biopsy  Approximately 20% Faizan pattern 4               K  Prostate, left lateral apex, core needle biopsy:             - Prostatic adenocarcinoma, [place] score 3 + 3 = 6, Prognostic Grade Group I, involving 5% of one core biopsy  L  Prostate, left medial apex, core needle biopsy:             - Prostatic adenocarcinoma, [place] 3 + 3 = 6, Prognostic Grade Group I, involving less than 5% of one core biopsy            9/23/2019 -  Cancer Staged    Staging form: Prostate, AJCC 8th Edition  - Clinical: Stage IIB (cT1c, cN0, cM0, PSA: 4 2, Grade Group: 2) - Signed by Cornelio Alcantara MD on 9/23/2019  Prostate specific antigen (PSA) range: Less than 10  Histologic grading system: 5 grade system  Webster score: 7       10/11/2019 -  Hormone Therapy    Firmagon on 10/11/19 and Lupron on 11/11/19 12/9/2019 - 2/11/2020 Radiation    7920cGy in 44 daily 180cGy fractions tot he prostate and proximal seminal vesicles         Past Medical History:   Diagnosis Date    Benign neoplasm of colon     Cancer [facility])     Prostate CA    Colon polyp     Colon, diverticulosis     ED (erectile dysfunction) of organic origin     Former tobacco use     Mixed hyperlipidemia     Obesity     Prediabetes     Prostate cancer [facility])      Past Surgical History:   Procedure Laterality Date    COLONOSCOPY  10/30/2014    COLONOSCOPY  2004    COLONOSCOPY      PROSTATE BIOPSY      RADIOACTIVE PLAQUE INSERTION N/A 2019    Procedure: TISSUE MARKER INSERTION, SPACEOAR INSERTION;  Surgeon: Tori Odom MD;  [facility];  Service: Urology    VENTRAL HERNIA REPAIR         Social History   Social History     Substance and Sexual Activity   Alcohol Use Yes    Comment: socially     Social History     Substance and Sexual Activity   Drug Use No     Social History     Tobacco Use   Smoking Status Former Smoker    Packs/day: 0 25    Years: 6 00    Pack years: 1 50    Types: Cigarettes    Quit date: 1    Years since quittin 4   Smokeless Tobacco Never Used         Meds/Allergies     Current Outpatient Medications:     Acetaminophen 325 MG CAPS, Take by mouth as needed , Disp: , Rfl:     ASPIRIN 81 PO, Take by mouth, Disp: , Rfl:     Multiple Vitamin (MULTIVITAMINS PO), Take by mouth daily , Disp: , Rfl:     XELPROS 0 005 % EMUL, INSTILL 1 DROP DAILY IN EACH EYE AS DIRECTED, Disp: , Rfl: 3  No Known Allergies      Review of Systems   Constitutional: Negative  HENT: Positive for dental problem (dental bridge)  Eyes:        Dry eye  Wears glasses   Respiratory: Negative  Cardiovascular: Negative  Gastrointestinal: Negative  Endocrine: Negative  Genitourinary:        Nocturia: 3 times/ night   Musculoskeletal: Positive for neck stiffness (sometimes)  Skin: Negative  Allergic/Immunologic: Negative  Neurological: Negative  Hematological: Negative  Psychiatric/Behavioral: Negative              IPSS Questionnaire (AUA-7):   Over the past month     1)  How often have you had a sensation of not emptying your bladder completely after you finish urinating? 0 - Not at all   2)  How often have you had to urinate again less than two hours after you finished urinating? 1 - Less than 1 time in 5   3)  How often have you found you stopped and started again several times when you urinated? 0 - Not at all   4) How difficult have you found it to postpone urination? 0 - Not at all   5) How often have you had a weak urinary stream?  0 - Not at all   6) How often have you had to push or strain to begin urination? 0 - Not at all   7) How many times did you most typically get up to urinate from the time you went to bed until the time you got up in the morning? 3 - 3 times   Total Score:  4          OBJECTIVE:   /86   Pulse 71   Temp (!) 97 °F (36 1 °C)   Resp 16   Wt 108 kg (237 lb)   SpO2 99%   BMI 32 59 kg/m²   Karnofsky: 90 - Able to carry on normal activity; minor signs or symptoms of disease     Physical Exam  Vitals and nursing note reviewed  Constitutional:       General: He is not in acute distress  Appearance: He is well-developed  Eyes:      General: No scleral icterus  Cardiovascular:      Rate and Rhythm: Normal rate and regular rhythm  Heart sounds: Normal heart sounds  No murmur heard  Pulmonary:      Breath sounds: No wheezing, rhonchi or rales  Chest:   Breasts:      Right: No supraclavicular adenopathy  Left: No supraclavicular adenopathy  Abdominal:      General: There is no distension  Palpations: Abdomen is soft  Tenderness: There is no abdominal tenderness  Musculoskeletal:      Right lower leg: No edema  Left lower leg: No edema  Comments: No spinal or CVA tenderness to percussion   Lymphadenopathy:      Cervical: No cervical adenopathy  Upper Body:      Right upper body: No supraclavicular adenopathy  Left upper body: No supraclavicular adenopathy     Neurological:      Mental Status: He is alert and oriented to person, place, and time  Gait: Gait normal             RESULTS    Lab Results:   Recent Results (from the past 672 hour(s))   PSA Total, Diagnostic    Collection Time: 05/27/22  1:18 PM   Result Value Ref Range    PSA, Diagnostic 0 3 0 0 - 4 0 ng/mL         Assessment/Plan:  Sam Gonsalez is a 70y o  year old male with a history of intermediate risk prostate cancer status post 6months ADT and definitive radiation completed about 2 years ago  He is currently biochemically MARITA  Overall, he is doing well  I will see him again in 1 year  He will follow-up with 6 month PSA check by Urology in the interim  We will see him sooner if needed  Brian Corcoran MD  6/10/2022,9:36 AM    Portions of the record may have been created with voice recognition software   Occasional wrong word or "sound a like" substitutions may have occurred due to the inherent limitations of voice recognition software   Read the chart carefully and recognize, using context, where substitutions have occurred

## 2022-08-16 NOTE — PROGRESS NOTES
8/18/2022      Chief Complaint   Patient presents with    Prostate Cancer     Assessment and Plan    1  [place] 7 (3+4) prostate cancer   - T1c, pretreatment PSA of 4 2   - S/p definitive radiation with concurrent 6 months of ADT on 2/11/20  Last Lupron injection was on 11/11/19    - Most recent PSA from 5/27/22 was stable at 0 3    - Repeat PSA in November 2022  If remains stable, can f/u in May 2023 with PSA prior to visit  He continues to follow with radiation oncology as well  History of Present Illness  Arlin Allen is a 67 y o  male here for follow up evaluation of  prostate cancer  He has history of [place] 7 prostate cancer and underwent definitive radiation with concurrent 6 months of ADT completed in February of 2020  His PSA remains stable at 0 3  He denies any urinary complaints or issues  He is doing well  Review of Systems   Constitutional: Negative for chills and fever  Respiratory: Negative for shortness of breath  Cardiovascular: Negative for chest pain  Gastrointestinal: Negative for abdominal pain  Genitourinary: Negative for difficulty urinating, dysuria, flank pain, frequency, hematuria and urgency  Neurological: Negative for dizziness         Past Medical History  Past Medical History:   Diagnosis Date    Benign neoplasm of colon     Cancer ([facility] [address] )     Prostate CA    Colon polyp     Colon, diverticulosis     ED (erectile dysfunction) of organic origin     Former tobacco use     Mixed hyperlipidemia     Obesity     Prediabetes     Prostate cancer [facility])        Past Social History  Past Surgical History:   Procedure Laterality Date    COLONOSCOPY  10/30/2014    COLONOSCOPY  2004    COLONOSCOPY  2006    PROSTATE BIOPSY      RADIOACTIVE PLAQUE INSERTION N/A 11/19/2019    Procedure: TISSUE MARKER INSERTION, SPACEOAR INSERTION;  Surgeon: Magalie Espitia MD;  [facility];  Service: Urology    VENTRAL HERNIA REPAIR       Social History     Tobacco Use   Smoking Status Former Smoker    Packs/day: 0 25    Years: 6 00    Pack years: 1 50    Types: Cigarettes    Quit date: 1    Years since quittin 6   Smokeless Tobacco Never Used       Past Family History  Family History   Problem Relation Age of Onset    Breast cancer Mother     Heart disease Father         Cardiac pacemaker    Cancer Sister     Stroke Brother        Past Social history  Social History     Socioeconomic History    Marital status: /Civil Union     Spouse name: Not on file    Number of children: Not on file    Years of education: Not on file    Highest education level: Not on file   Occupational History    Not on file   Tobacco Use    Smoking status: Former Smoker     Packs/day: 0 25     Years: 6 00     Pack years: 1 50     Types: Cigarettes     Quit date:      Years since quittin 6    Smokeless tobacco: Never Used   Vaping Use    Vaping Use: Never used   Substance and Sexual Activity    Alcohol use: Yes     Comment: socially    Drug use: No    Sexual activity: Yes     Partners: Female   Other Topics Concern    Not on file   Social History Narrative    Not on file     Social Determinants of Health     Financial Resource Strain: Not on file   Food Insecurity: Not on file   Transportation Needs: Not on file   Physical Activity: Not on file   Stress: Not on file   Social Connections: Not on file   Intimate Partner Violence: Not on file   Housing Stability: Not on file       Current Medications  Current Outpatient Medications   Medication Sig Dispense Refill    Acetaminophen 325 MG CAPS Take by mouth as needed       ASPIRIN 81 PO Take by mouth      Multiple Vitamin (MULTIVITAMINS PO) Take by mouth daily       XELPROS 0 005 % EMUL INSTILL 1 DROP DAILY IN EACH EYE AS DIRECTED  3     No current facility-administered medications for this visit         Allergies  No Known Allergies      The following portions of the patient's history were reviewed and updated as appropriate: allergies, current medications, past medical history, past social history, past surgical history and problem list       Vitals  Vitals:    08/18/22 0747   BP: 116/68   Pulse: 76   SpO2: 99%   Weight: 104 kg (230 lb)   Height: 5' 11 5" (1 816 m)           Physical Exam  Physical Exam  Constitutional:       Appearance: Normal appearance  HENT:      Head: Normocephalic and atraumatic  Right Ear: External ear normal       Left Ear: External ear normal    Eyes:      General: No scleral icterus  Conjunctiva/sclera: Conjunctivae normal    Cardiovascular:      Pulses: Normal pulses  Pulmonary:      Effort: Pulmonary effort is normal    Musculoskeletal:         General: Normal range of motion  Cervical back: Normal range of motion  Neurological:      General: No focal deficit present  Mental Status: He is alert and oriented to person, place, and time  Psychiatric:         Mood and Affect: Mood normal          Behavior: Behavior normal          Thought Content:  Thought content normal          Judgment: Judgment normal            Results  No results found for this or any previous visit (from the past 1 hour(s)) ]  Lab Results   Component Value Date    PSA 0 3 05/27/2022    PSA 0 3 11/30/2021    PSA 0 3 06/11/2021     Lab Results   Component Value Date    CALCIUM 9 8 02/09/2022    K 4 3 02/09/2022    CO2 25 02/09/2022     (H) 02/09/2022    BUN 13 02/09/2022    CREATININE 1 28 02/09/2022     Lab Results   Component Value Date    WBC 4 56 02/09/2022    HGB 16 0 02/09/2022    HCT 50 9 (H) 02/09/2022    MCV 85 02/09/2022     02/09/2022           Orders  Orders Placed This Encounter   Procedures    PSA Total, Diagnostic     Standing Status:   Future     Standing Expiration Date:   8/18/2023    PSA Total, Diagnostic     Standing Status:   Future     Standing Expiration Date:   8/18/2023     Tommy Andrews PA-C

## 2022-08-18 ENCOUNTER — OFFICE VISIT (OUTPATIENT)
Dept: UROLOGY | Facility: CLINIC | Age: 72
End: 2022-08-18
Payer: COMMERCIAL

## 2022-08-18 VITALS
BODY MASS INDEX: 31.15 KG/M2 | HEIGHT: 72 IN | SYSTOLIC BLOOD PRESSURE: 116 MMHG | OXYGEN SATURATION: 99 % | WEIGHT: 230 LBS | HEART RATE: 76 BPM | DIASTOLIC BLOOD PRESSURE: 68 MMHG

## 2022-08-18 DIAGNOSIS — C61 PROSTATE CANCER (HCC): Primary | ICD-10-CM

## 2022-08-18 PROCEDURE — 1160F RVW MEDS BY RX/DR IN RCRD: CPT | Performed by: PHYSICIAN ASSISTANT

## 2022-08-18 PROCEDURE — 99213 OFFICE O/P EST LOW 20 MIN: CPT | Performed by: PHYSICIAN ASSISTANT

## 2022-11-15 ENCOUNTER — APPOINTMENT (OUTPATIENT)
Dept: LAB | Facility: HOSPITAL | Age: 72
End: 2022-11-15

## 2022-11-15 DIAGNOSIS — C61 PROSTATE CANCER (HCC): ICD-10-CM

## 2022-11-15 LAB — PSA SERPL-MCNC: 0.4 NG/ML (ref 0–4)

## 2022-11-16 ENCOUNTER — TELEPHONE (OUTPATIENT)
Dept: UROLOGY | Facility: CLINIC | Age: 72
End: 2022-11-16

## 2022-11-16 ENCOUNTER — AMBULATORY SURGICAL CENTER (OUTPATIENT)
Dept: URBAN - METROPOLITAN AREA SURGERY 19 | Facility: SURGERY | Age: 72
End: 2022-11-16
Payer: MEDICARE

## 2022-11-16 DIAGNOSIS — Z86.010 PERSONAL HISTORY OF COLONIC POLYPS: ICD-10-CM

## 2022-11-16 LAB
COLONOSCOPY STUDY: (no result)
COLONOSCOPY STUDY: (no result)

## 2022-11-16 PROCEDURE — G0105 COLORECTAL SCRN; HI RISK IND: HCPCS | Performed by: SPECIALIST

## 2022-11-16 NOTE — TELEPHONE ENCOUNTER
Left message on house phone regarding results  Advised patient he can follow up in May with PSA PTV  Any questions, Urology number was provided

## 2022-11-16 NOTE — TELEPHONE ENCOUNTER
----- Message from Kurt Banks PA-C sent at 11/16/2022  7:35 AM EST -----  PSA stable, follow up in May as scheduled with PSA prior

## 2023-03-30 NOTE — PROGRESS NOTES
4/3/2023      Chief Complaint   Patient presents with   • Follow-up     Assessment and Plan    1  Faiz[facility] 7 (3+4) prostate cancer   - T1c, pretreatment PSA of 4 2   - S/p definitive radiation with concurrent 6 months of ADT on 2/11/20  Last Lupron injection was on 11/11/19    - Most recent PSA from 11/15/22 was stable at 0 4  - Next PSA due in May 2023  If stable, follow up in 11/2023 with another PSA prior to visit  History of Present Illness  Katelynn Colmenares is a 67 y o  male here for follow up evaluation of prostate cancer  He has history of [place] 7 prostate cancer and underwent radiation therapy with concurrent 6 months of ADT completed in February 2020  Most recent PSA from November 2022 was 0 4  He denies any urinary complaints or issues  Review of Systems   Constitutional: Negative for chills and fever  Respiratory: Negative for shortness of breath  Cardiovascular: Negative for chest pain  Gastrointestinal: Negative for abdominal pain  Genitourinary: Negative for difficulty urinating, dysuria, flank pain, frequency, hematuria and urgency  Neurological: Negative for dizziness             AUA SYMPTOM SCORE    Flowsheet Row Most Recent Value   AUA SYMPTOM SCORE    How often have you had a sensation of not emptying your bladder completely after you finished urinating? 0 (P)     How often have you had to urinate again less than two hours after you finished urinating? 0 (P)     How often have you found you stopped and started again several times when you urinate? 0 (P)     How often have you found it difficult to postpone urination? 0 (P)     How often have you had a weak urinary stream? 0 (P)     How often have you had to push or strain to begin urination? 2 (P)     How many times did you most typically get up to urinate from the time you went to bed at night until the time you got up in the morning? 3 (P)     Quality of Life: If you were to spend the rest of your life with your urinary condition just the way it is now, how would you feel about that? 2 (P)     AUA SYMPTOM SCORE 5 (P)              Past Medical History  Past Medical History:   Diagnosis Date   • Benign neoplasm of colon    • Cancer ([address] )     Prostate CA   • Colon polyp    • Colon, diverticulosis    • ED (erectile dysfunction) of organic origin    • Former tobacco use    • Mixed hyperlipidemia    • Obesity    • Prediabetes    • Prostate cancer [facility])        Past Social History  Past Surgical History:   Procedure Laterality Date   • COLONOSCOPY  10/30/2014   • COLONOSCOPY     • COLONOSCOPY     • PROSTATE BIOPSY     • RADIOACTIVE PLAQUE INSERTION N/A 2019    Procedure: TISSUE MARKER INSERTION, SPACEOAR INSERTION;  Surgeon: Tristin Galicia MD;  Location: [facility];  Service: Urology   • VENTRAL HERNIA REPAIR       Social History     Tobacco Use   Smoking Status Former   • Packs/day: 0 25   • Years: 6 00   • Pack years: 1 50   • Types: Cigarettes   • Quit date: 1978   • Years since quittin 2   Smokeless Tobacco Never       Past Family History  Family History   Problem Relation Age of Onset   • Breast cancer Mother    • Heart disease Father         Cardiac pacemaker   • Cancer Sister    • Stroke Brother        Past Social history  Social History     Socioeconomic History   • Marital status: /Civil Union     Spouse name: Not on file   • Number of children: Not on file   • Years of education: Not on file   • Highest education level: Not on file   Occupational History   • Not on file   Tobacco Use   • Smoking status: Former     Packs/day: 0 25     Years: 6 00     Pack years: 1 50     Types: Cigarettes     Quit date: 1978     Years since quittin 2   • Smokeless tobacco: Never   Vaping Use   • Vaping Use: Never used   Substance and Sexual Activity   • Alcohol use: Yes     Comment: socially   • Drug use: No   • Sexual activity: Yes     Partners: Female   Other Topics Concern   • Not on file "  Social History Narrative   • Not on file     Social Determinants of Health     Financial Resource Strain: Not on file   Food Insecurity: Not on file   Transportation Needs: Not on file   Physical Activity: Not on file   Stress: Not on file   Social Connections: Not on file   Intimate Partner Violence: Not on file   Housing Stability: Not on file       Current Medications  Current Outpatient Medications   Medication Sig Dispense Refill   • Acetaminophen 325 MG CAPS Take by mouth as needed      • ASPIRIN 81 PO Take by mouth     • Multiple Vitamin (MULTIVITAMINS PO) Take by mouth daily      • XELPROS 0 005 % EMUL INSTILL 1 DROP DAILY IN EACH EYE AS DIRECTED  3     No current facility-administered medications for this visit  Allergies  No Known Allergies      The following portions of the patient's history were reviewed and updated as appropriate: allergies, current medications, past medical history, past social history, past surgical history and problem list       Vitals  Vitals:    04/03/23 0814   BP: 140/90   BP Location: Left arm   Patient Position: Sitting   Cuff Size: Large   Pulse: 86   SpO2: 95%   Weight: 106 kg (233 lb)   Height: 5' 11 5\" (1 816 m)           Physical Exam  Physical Exam  Constitutional:       Appearance: Normal appearance  HENT:      Head: Normocephalic and atraumatic  Right Ear: External ear normal       Left Ear: External ear normal    Eyes:      General: No scleral icterus  Conjunctiva/sclera: Conjunctivae normal    Cardiovascular:      Pulses: Normal pulses  Pulmonary:      Effort: Pulmonary effort is normal    Musculoskeletal:         General: Normal range of motion  Cervical back: Normal range of motion  Neurological:      General: No focal deficit present  Mental Status: He is alert and oriented to person, place, and time  Psychiatric:         Mood and Affect: Mood normal          Behavior: Behavior normal          Thought Content:  Thought content " normal          Judgment: Judgment normal            Results  No results found for this or any previous visit (from the past 1 hour(s)) ]  Lab Results   Component Value Date    PSA 0 4 11/15/2022    PSA 0 3 05/27/2022    PSA 0 3 11/30/2021     Lab Results   Component Value Date    CALCIUM 9 8 02/09/2022    K 4 3 02/09/2022    CO2 25 02/09/2022     (H) 02/09/2022    BUN 13 02/09/2022    CREATININE 1 28 02/09/2022     Lab Results   Component Value Date    WBC 4 56 02/09/2022    HGB 16 0 02/09/2022    HCT 50 9 (H) 02/09/2022    MCV 85 02/09/2022     02/09/2022           Orders  Orders Placed This Encounter   Procedures   • PSA Total, Diagnostic     Standing Status:   Future     Standing Expiration Date:   4/3/2024       Garcia Dominguez

## 2023-04-03 ENCOUNTER — OFFICE VISIT (OUTPATIENT)
Dept: UROLOGY | Facility: CLINIC | Age: 73
End: 2023-04-03

## 2023-04-03 VITALS
WEIGHT: 233 LBS | HEIGHT: 72 IN | DIASTOLIC BLOOD PRESSURE: 90 MMHG | OXYGEN SATURATION: 95 % | HEART RATE: 86 BPM | SYSTOLIC BLOOD PRESSURE: 140 MMHG | BODY MASS INDEX: 31.56 KG/M2

## 2023-04-03 DIAGNOSIS — C61 PROSTATE CANCER (HCC): Primary | ICD-10-CM

## 2023-04-25 ENCOUNTER — OFFICE VISIT (OUTPATIENT)
Age: 73
End: 2023-04-25

## 2023-04-25 ENCOUNTER — APPOINTMENT (OUTPATIENT)
Age: 73
End: 2023-04-25

## 2023-04-25 VITALS
HEART RATE: 73 BPM | BODY MASS INDEX: 32.89 KG/M2 | SYSTOLIC BLOOD PRESSURE: 126 MMHG | DIASTOLIC BLOOD PRESSURE: 76 MMHG | WEIGHT: 242.8 LBS | TEMPERATURE: 97.6 F | OXYGEN SATURATION: 97 % | HEIGHT: 72 IN

## 2023-04-25 DIAGNOSIS — E78.2 MIXED HYPERLIPIDEMIA: Chronic | ICD-10-CM

## 2023-04-25 DIAGNOSIS — R73.03 PREDIABETES: Chronic | ICD-10-CM

## 2023-04-25 DIAGNOSIS — Z00.00 ADULT GENERAL MEDICAL EXAMINATION: ICD-10-CM

## 2023-04-25 DIAGNOSIS — Z00.00 ADULT GENERAL MEDICAL EXAMINATION: Primary | ICD-10-CM

## 2023-04-25 LAB
ALBUMIN SERPL BCP-MCNC: 4 G/DL (ref 3.5–5)
ALP SERPL-CCNC: 42 U/L (ref 46–116)
ALT SERPL W P-5'-P-CCNC: 41 U/L (ref 12–78)
ANION GAP SERPL CALCULATED.3IONS-SCNC: 7 MMOL/L (ref 4–13)
AST SERPL W P-5'-P-CCNC: 21 U/L (ref 5–45)
BASOPHILS # BLD AUTO: 0.04 THOUSANDS/ΜL (ref 0–0.1)
BASOPHILS NFR BLD AUTO: 1 % (ref 0–1)
BILIRUB SERPL-MCNC: 0.38 MG/DL (ref 0.2–1)
BUN SERPL-MCNC: 16 MG/DL (ref 5–25)
CALCIUM SERPL-MCNC: 9.6 MG/DL (ref 8.3–10.1)
CHLORIDE SERPL-SCNC: 108 MMOL/L (ref 96–108)
CHOLEST SERPL-MCNC: 182 MG/DL
CO2 SERPL-SCNC: 24 MMOL/L (ref 21–32)
CREAT SERPL-MCNC: 1.26 MG/DL (ref 0.6–1.3)
EOSINOPHIL # BLD AUTO: 0.26 THOUSAND/ΜL (ref 0–0.61)
EOSINOPHIL NFR BLD AUTO: 6 % (ref 0–6)
ERYTHROCYTE [DISTWIDTH] IN BLOOD BY AUTOMATED COUNT: 14.9 % (ref 11.6–15.1)
GFR SERPL CREATININE-BSD FRML MDRD: 56 ML/MIN/1.73SQ M
GLUCOSE P FAST SERPL-MCNC: 112 MG/DL (ref 65–99)
HCT VFR BLD AUTO: 49.5 % (ref 36.5–49.3)
HDLC SERPL-MCNC: 68 MG/DL
HGB BLD-MCNC: 15.3 G/DL (ref 12–17)
IMM GRANULOCYTES # BLD AUTO: 0.02 THOUSAND/UL (ref 0–0.2)
IMM GRANULOCYTES NFR BLD AUTO: 1 % (ref 0–2)
LDLC SERPL CALC-MCNC: 104 MG/DL (ref 0–100)
LYMPHOCYTES # BLD AUTO: 1.47 THOUSANDS/ΜL (ref 0.6–4.47)
LYMPHOCYTES NFR BLD AUTO: 34 % (ref 14–44)
MCH RBC QN AUTO: 26.9 PG (ref 26.8–34.3)
MCHC RBC AUTO-ENTMCNC: 30.9 G/DL (ref 31.4–37.4)
MCV RBC AUTO: 87 FL (ref 82–98)
MONOCYTES # BLD AUTO: 0.38 THOUSAND/ΜL (ref 0.17–1.22)
MONOCYTES NFR BLD AUTO: 9 % (ref 4–12)
NEUTROPHILS # BLD AUTO: 2.14 THOUSANDS/ΜL (ref 1.85–7.62)
NEUTS SEG NFR BLD AUTO: 49 % (ref 43–75)
NRBC BLD AUTO-RTO: 0 /100 WBCS
PLATELET # BLD AUTO: 270 THOUSANDS/UL (ref 149–390)
PMV BLD AUTO: 9.7 FL (ref 8.9–12.7)
POTASSIUM SERPL-SCNC: 4.4 MMOL/L (ref 3.5–5.3)
PROT SERPL-MCNC: 7.5 G/DL (ref 6.4–8.4)
RBC # BLD AUTO: 5.68 MILLION/UL (ref 3.88–5.62)
SODIUM SERPL-SCNC: 139 MMOL/L (ref 135–147)
TRIGL SERPL-MCNC: 51 MG/DL
WBC # BLD AUTO: 4.31 THOUSAND/UL (ref 4.31–10.16)

## 2023-04-25 NOTE — PATIENT INSTRUCTIONS
Wellness Visit for Adults   AMBULATORY CARE:   A wellness visit  is when you see your healthcare provider to get screened for health problems  Your healthcare provider will also give you advice on how to stay healthy  Write down your questions so you remember to ask them  Ask your healthcare provider how often you should have a wellness visit  What happens at a wellness visit:  Your healthcare provider will ask about your health, and your family history of health problems  This includes high blood pressure, heart disease, and cancer  He or she will ask if you have symptoms that concern you, if you smoke, and about your mood  You may also be asked about your intake of medicines, supplements, food, and alcohol  Any of the following may be done: Your weight  will be checked  Your height may also be checked so your body mass index (BMI) can be calculated  Your BMI shows if you are at a healthy weight  Your blood pressure  and heart rate will be checked  Your temperature may also be checked  Blood and urine tests  may be done  Blood tests may be done to check your cholesterol levels  Abnormal cholesterol levels increase your risk for heart disease and stroke  You may also need a blood or urine test to check for diabetes if you are at increased risk  Urine tests may be done to look for signs of an infection or kidney disease  A physical exam  includes checking your heartbeat and lungs with a stethoscope  Your healthcare provider may also check your skin to look for sun damage  Screening tests  may be recommended  A screening test is done to check for diseases that may not cause symptoms  The screening tests you may need depend on your age, gender, family history, and lifestyle habits  For example, colorectal screening may be recommended if you are 48years old or older  Screening tests you need if you are a woman:   A Pap smear  is used to screen for cervical cancer   Pap smears are usually done every 3 to 5 years depending on your age  You may need them more often if you have had abnormal Pap smear test results in the past  Ask your healthcare provider how often you should have a Pap smear  A mammogram  is an x-ray of your breasts to screen for breast cancer  Experts recommend mammograms every 2 years starting at age 48 years  You may need a mammogram at age 52 years or younger if you have an increased risk for breast cancer  Talk to your healthcare provider about when you should start having mammograms and how often you need them  Vaccines you may need:   Get an influenza vaccine  every year  The influenza vaccine protects you from the flu  Several types of viruses cause the flu  The viruses change over time, so new vaccines are made each year  Get a tetanus-diphtheria (Td) booster vaccine  every 10 years  This vaccine protects you against tetanus and diphtheria  Tetanus is a severe infection that may cause painful muscle spasms and lockjaw  Diphtheria is a severe bacterial infection that causes a thick covering in the back of your mouth and throat  Get a human papillomavirus (HPV) vaccine  if you are female and aged 23 to 32 or male 23 to 24 and never received it  This vaccine protects you from HPV infection  HPV is the most common infection spread by sexual contact  HPV may also cause vaginal, penile, and anal cancers  Get a pneumococcal vaccine  if you are aged 72 years or older  The pneumococcal vaccine is an injection given to protect you from pneumococcal disease  Pneumococcal disease is an infection caused by pneumococcal bacteria  The infection may cause pneumonia, meningitis, or an ear infection  Get a shingles vaccine  if you are 60 or older, even if you have had shingles before  The shingles vaccine is an injection to protect you from the varicella-zoster virus  This is the same virus that causes chickenpox   Shingles is a painful rash that develops in people who had chickenpox or have been exposed to the virus  How to eat healthy:  My Plate is a model for planning healthy meals  It shows the types and amounts of foods that should go on your plate  Fruits and vegetables make up about half of your plate, and grains and protein make up the other half  A serving of dairy is included on the side of your plate  The amount of calories and serving sizes you need depends on your age, gender, weight, and height  Examples of healthy foods are listed below:  Eat a variety of vegetables  such as dark green, red, and orange vegetables  You can also include canned vegetables low in sodium (salt) and frozen vegetables without added butter or sauces  Eat a variety of fresh fruits , canned fruit in 100% juice, frozen fruit, and dried fruit  Include whole grains  At least half of the grains you eat should be whole grains  Examples include whole-wheat bread, wheat pasta, brown rice, and whole-grain cereals such as oatmeal     Eat a variety of protein foods such as seafood (fish and shellfish), lean meat, and poultry without skin (turkey and chicken)  Examples of lean meats include pork leg, shoulder, or tenderloin, and beef round, sirloin, tenderloin, and extra lean ground beef  Other protein foods include eggs and egg substitutes, beans, peas, soy products, nuts, and seeds  Choose low-fat dairy products such as skim or 1% milk or low-fat yogurt, cheese, and cottage cheese  Limit unhealthy fats  such as butter, hard margarine, and shortening  Exercise:  Exercise at least 30 minutes per day on most days of the week  Some examples of exercise include walking, biking, dancing, and swimming  You can also fit in more physical activity by taking the stairs instead of the elevator or parking farther away from stores  Include muscle strengthening activities 2 days each week  Regular exercise provides many health benefits   It helps you manage your weight, and decreases your risk for type 2 diabetes, heart disease, stroke, and high blood pressure  Exercise can also help improve your mood  Ask your healthcare provider about the best exercise plan for you  General health and safety guidelines:   Do not smoke  Nicotine and other chemicals in cigarettes and cigars can cause lung damage  Ask your healthcare provider for information if you currently smoke and need help to quit  E-cigarettes or smokeless tobacco still contain nicotine  Talk to your healthcare provider before you use these products  Limit alcohol  A drink of alcohol is 12 ounces of beer, 5 ounces of wine, or 1½ ounces of liquor  Lose weight, if needed  Being overweight increases your risk of certain health conditions  These include heart disease, high blood pressure, type 2 diabetes, and certain types of cancer  Protect your skin  Do not sunbathe or use tanning beds  Use sunscreen with a SPF 15 or higher  Apply sunscreen at least 15 minutes before you go outside  Reapply sunscreen every 2 hours  Wear protective clothing, hats, and sunglasses when you are outside  Drive safely  Always wear your seatbelt  Make sure everyone in your car wears a seatbelt  A seatbelt can save your life if you are in an accident  Do not use your cell phone when you are driving  This could distract you and cause an accident  Pull over if you need to make a call or send a text message  Practice safe sex  Use latex condoms if are sexually active and have more than one partner  Your healthcare provider may recommend screening tests for sexually transmitted infections (STIs)  Wear helmets, lifejackets, and protective gear  Always wear a helmet when you ride a bike or motorcycle, go skiing, or play sports that could cause a head injury  Wear protective equipment when you play sports  Wear a lifejacket when you are on a boat or doing water sports      © Copyright Luis Rodrigues 2022 Information is for End User's use only and may not be sold, redistributed or otherwise used for commercial purposes  The above information is an  only  It is not intended as medical advice for individual conditions or treatments  Talk to your doctor, nurse or pharmacist before following any medical regimen to see if it is safe and effective for you

## 2023-04-25 NOTE — PROGRESS NOTES
[address] [facility] PRIMARY CARE [place]    NAME: Kostas Julian  AGE: 67 y o  SEX: male  : 1950     DATE: 2023     Assessment and Plan:     Problem List Items Addressed This Visit        Other    Prediabetes (Chronic)    Relevant Orders    Hemoglobin A1C    Mixed hyperlipidemia (Chronic)    Relevant Orders    Lipid Panel with Direct LDL reflex    Comprehensive metabolic panel   Other Visit Diagnoses     Adult general medical examination    -  Primary    Relevant Orders    CBC and differential        He is doing well  Discussed diet and lifestyle modifications  Check updated lab work  Get PSA in May as ordered by urology  Discussed conservative treatment recommendations for trigger finger  Immunizations and preventive care screenings were discussed with patient today  Appropriate education was printed on patient's after visit summary  Counseling:  Alcohol/drug use: discussed moderation in alcohol intake, the recommendations for healthy alcohol use, and avoidance of illicit drug use  Dental Health: discussed importance of regular tooth brushing, flossing, and dental visits  Injury prevention: discussed safety/seat belts, safety helmets, smoke detectors, carbon dioxide detectors, and smoking near bedding or upholstery  Sexual health: discussed sexually transmitted diseases, partner selection, use of condoms, avoidance of unintended pregnancy, and contraceptive alternatives  · Exercise: the importance of regular exercise/physical activity was discussed  Recommend exercise 3-5 times per week for at least 30 minutes  BMI Counseling: Body mass index is 33 39 kg/m²  The BMI is above normal  Nutrition recommendations include decreasing portion sizes, encouraging healthy choices of fruits and vegetables, limiting drinks that contain sugar, moderation in carbohydrate intake and increasing intake of lean protein   Exercise recommendations include exercising 3-5 times per week  Rationale for BMI follow-up plan is due to patient being overweight or obese  Depression Screening and Follow-up Plan: Patient was screened for depression during today's encounter  They screened negative with a PHQ-2 score of 0  Return in about 1 year (around 4/25/2024) for Annual Physical      Chief Complaint:     Chief Complaint   Patient presents with   • Physical Exam     Finger on left hand locks at times      History of Present Illness:     Adult Annual Physical   Patient here for a comprehensive physical exam  The patient reports he is doing well  Has a history of prostate cancer that was previously treated  Saw urology this month and has PSA to get done in May 2023  Has had difficulty losing belly fat  No formal exercise  Not currently on any specific diet  Labs in the past have shown elevated lipids and A1c in prediabetic range  Most recent A1c was 6 0 % on 2/9/2022  He is UTD with colon cancer screening  Depression Screening  PHQ-2/9 Depression Screening    Little interest or pleasure in doing things: 0 - not at all  Feeling down, depressed, or hopeless: 0 - not at all  PHQ-2 Score: 0  PHQ-2 Interpretation: Negative depression screen       General Health  · Sleep: sleeps well  · Hearing: normal - bilateral   · Vision: vision problems: glaucoma left eye, goes for regular eye exams and wears glasses  · Dental: regular dental visits and brushes teeth twice daily  Review of Systems:     Review of Systems   Constitutional: Negative for appetite change, chills, fatigue and fever  HENT: Negative for congestion, hearing loss, postnasal drip, rhinorrhea, sore throat, tinnitus and trouble swallowing  Eyes: Negative for pain, discharge, redness and visual disturbance  Respiratory: Negative for cough, chest tightness, shortness of breath and wheezing  Cardiovascular: Negative for chest pain, palpitations and leg swelling     Gastrointestinal: Negative for abdominal distention, abdominal pain, blood in stool, constipation, diarrhea, nausea and vomiting  Endocrine: Negative for cold intolerance, heat intolerance, polydipsia, polyphagia and polyuria  Genitourinary: Negative for difficulty urinating, dysuria, frequency, hematuria and urgency  Musculoskeletal: Positive for arthralgias (left middle finger locks sometimes)  Negative for back pain, gait problem, joint swelling, myalgias, neck pain and neck stiffness  Skin: Negative for color change and rash  Neurological: Negative for dizziness, tremors, seizures, syncope, speech difficulty, weakness, light-headedness, numbness and headaches  Hematological: Negative for adenopathy  Does not bruise/bleed easily  Psychiatric/Behavioral: Negative for agitation, behavioral problems, confusion, hallucinations, sleep disturbance and suicidal ideas  The patient is not nervous/anxious         Past Medical History:     Past Medical History:   Diagnosis Date   • Benign neoplasm of colon    • Cancer [facility])     Prostate CA   • Colon polyp    • Colon, diverticulosis    • ED (erectile dysfunction) of organic origin    • Former tobacco use    • Mixed hyperlipidemia    • Obesity    • Prediabetes    • Prostate cancer [facility])       Past Surgical History:     Past Surgical History:   Procedure Laterality Date   • COLONOSCOPY  10/30/2014   • COLONOSCOPY  2004   • COLONOSCOPY  2006   • PROSTATE BIOPSY     • RADIOACTIVE PLAQUE INSERTION N/A 11/19/2019    Procedure: TISSUE MARKER INSERTION, SPACEOAR INSERTION;  Surgeon: Corinne Cook MD;  Location[facility];  Service: Urology   • VENTRAL HERNIA REPAIR        Family History:     Family History   Problem Relation Age of Onset   • Breast cancer Mother    • Heart disease Father         Cardiac pacemaker   • Cancer Sister    • Stroke Brother       Social History:     Social History     Socioeconomic History   • Marital status: /Civil Union     Spouse name: None   • Number "of children: None   • Years of education: None   • Highest education level: None   Occupational History   • None   Tobacco Use   • Smoking status: Former     Packs/day: 0 25     Years: 6 00     Pack years: 1 50     Types: Cigarettes     Quit date: 1978     Years since quittin 3   • Smokeless tobacco: Never   Vaping Use   • Vaping Use: Never used   Substance and Sexual Activity   • Alcohol use: Yes     Comment: socially   • Drug use: No   • Sexual activity: Yes     Partners: Female   Other Topics Concern   • None   Social History Narrative   • None     Social Determinants of Health     Financial Resource Strain: Not on file   Food Insecurity: Not on file   Transportation Needs: Not on file   Physical Activity: Not on file   Stress: Not on file   Social Connections: Not on file   Intimate Partner Violence: Not on file   Housing Stability: Not on file      Current Medications:     Current Outpatient Medications   Medication Sig Dispense Refill   • Acetaminophen 325 MG CAPS Take by mouth as needed      • ASPIRIN 81 PO Take by mouth     • Multiple Vitamin (MULTIVITAMINS PO) Take by mouth daily      • XELPROS 0 005 % EMUL INSTILL 1 DROP DAILY IN EACH EYE AS DIRECTED  3     No current facility-administered medications for this visit  Allergies:     No Known Allergies   Physical Exam:     /76   Pulse 73   Temp 97 6 °F (36 4 °C)   Ht 5' 11 5\" (1 816 m)   Wt 110 kg (242 lb 12 8 oz)   SpO2 97%   BMI 33 39 kg/m²     Physical Exam  Vitals reviewed  Constitutional:       General: He is not in acute distress  Appearance: He is obese  He is not ill-appearing or toxic-appearing  HENT:      Head: Normocephalic and atraumatic  Right Ear: Tympanic membrane, ear canal and external ear normal  There is no impacted cerumen  Left Ear: Tympanic membrane, ear canal and external ear normal  There is no impacted cerumen  Nose: Nose normal  No congestion or rhinorrhea        Mouth/Throat:      " Mouth: Mucous membranes are moist       Pharynx: No oropharyngeal exudate or posterior oropharyngeal erythema  Eyes:      General: No scleral icterus  Right eye: No discharge  Left eye: No discharge  Extraocular Movements: Extraocular movements intact  Conjunctiva/sclera: Conjunctivae normal       Pupils: Pupils are equal, round, and reactive to light  Neck:      Vascular: No carotid bruit  Cardiovascular:      Rate and Rhythm: Normal rate and regular rhythm  Heart sounds: No murmur heard  Pulmonary:      Effort: Pulmonary effort is normal  No respiratory distress  Breath sounds: Normal breath sounds  No wheezing or rales  Abdominal:      General: Abdomen is protuberant  Bowel sounds are normal  There is no distension  Palpations: Abdomen is soft  Tenderness: There is no abdominal tenderness  Hernia: No hernia is present  Musculoskeletal:         General: Deformity (arthritic changes of hands) present  No tenderness  Cervical back: Neck supple  No muscular tenderness  Right lower leg: No edema  Left lower leg: No edema  Lymphadenopathy:      Cervical: No cervical adenopathy  Skin:     General: Skin is warm and dry  Findings: No rash  Neurological:      General: No focal deficit present  Mental Status: He is alert and oriented to person, place, and time  Cranial Nerves: No cranial nerve deficit  Sensory: No sensory deficit  Motor: No weakness        Coordination: Coordination normal       Gait: Gait normal    Psychiatric:         Mood and Affect: Mood normal          Behavior: Behavior normal           Sae Navarro DO  [facility]

## 2023-04-26 LAB
EST. AVERAGE GLUCOSE BLD GHB EST-MCNC: 120 MG/DL
HBA1C MFR BLD: 5.8 %

## 2023-04-27 ENCOUNTER — TELEPHONE (OUTPATIENT)
Age: 73
End: 2023-04-27

## 2023-04-27 NOTE — TELEPHONE ENCOUNTER
----- Message from Amy Castellon DO sent at 4/26/2023  8:48 PM EDT -----  Labs look pretty good  Glucose just mildly elevated and in the pre-diabetic range  His A1c did go dowm from 6 0 to 5 8%  So watch the diet closely  Recommend low carb diet  Increase lean protein intake  Avoid unhealthy fats  Avoid processed foods

## 2023-06-09 ENCOUNTER — CLINICAL SUPPORT (OUTPATIENT)
Dept: RADIATION ONCOLOGY | Facility: CLINIC | Age: 73
End: 2023-06-09
Attending: RADIOLOGY
Payer: COMMERCIAL

## 2023-06-09 VITALS
DIASTOLIC BLOOD PRESSURE: 84 MMHG | OXYGEN SATURATION: 95 % | WEIGHT: 238 LBS | BODY MASS INDEX: 32.73 KG/M2 | SYSTOLIC BLOOD PRESSURE: 140 MMHG | HEART RATE: 85 BPM | RESPIRATION RATE: 18 BRPM | TEMPERATURE: 98 F

## 2023-06-09 DIAGNOSIS — C61 PROSTATE CANCER (HCC): Primary | ICD-10-CM

## 2023-06-09 PROCEDURE — 99213 OFFICE O/P EST LOW 20 MIN: CPT | Performed by: RADIOLOGY

## 2023-06-09 PROCEDURE — 99211 OFF/OP EST MAY X REQ PHY/QHP: CPT | Performed by: RADIOLOGY

## 2023-06-09 PROCEDURE — G0463 HOSPITAL OUTPT CLINIC VISIT: HCPCS | Performed by: RADIOLOGY

## 2023-06-09 NOTE — PROGRESS NOTES
Shanell Peñaloza 1950 is a 67 y o  male with a history of clinical stage T1c prostatic adenocarcinoma, Shellman score 7 (3+4) with pretreatment PSA of 4 2ng/mL   He completed a course of definitive radiation with concurrent 6 months of ADT on 20  Jing Quiñonez last seen 6/10/22 and presents today for follow up          4/3/23 Urology, Evelyn Garcia  T1c, pretreatment PSA of 4 2   - S/p definitive radiation with concurrent 6 months of ADT on 20  Last Lupron injection was on 19    - Most recent PSA from 11/15/22 was stable at 0 4  - Next PSA due in May 2023  If stable, follow up in 2023 with another PSA prior to visit       Component PSA, Total   Latest Ref Rng & Units 0 0 - 4 0 ng/mL   2021 0 3   2022 0 3   11/15/2022 0 4   2023 0 4         Upcomin23 Urology, Evelyn Garcia          Oncology History   Prostate cancer [facility])   2019 Initial Diagnosis    Prostate cancer ([facility] [place] [address] )     2019 Biopsy    Final Diagnosis  A  Prostate, right lateral base, core needle biopsy: Benign prostate glands  No malignancy is identified  B  Prostate, right medial base, core needle biopsy: Benign prostate glands  No malignancy is identified  C  Prostate, right lateral mid, core needle biopsy: Focal high-grade PIN  No prostatic adenocarcinoma is identified  D  Prostate, right medial mid, core needle biopsy:Benign prostate glands  No malignancy is identified  E  Prostate, right lateral apex, core needle biopsy: Benign prostate glands  No malignancy is identified  F  Prostate, right medial apex, core needle biopsy: Benign prostate glands  No malignancy is identified      G  Prostate, left lateral base, core needle biopsy:             - Prostatic adenocarcinoma, Shellman score 3 + 4 = 7, Prognostic Grade Group II, discontinuously involving 60% of one core biopsy   Approximately 10% Shellman pattern 4                         H  Prostate, left medial, core needle biopsy: Prostatic adenocarcinoma, Shellman score 3 + 3 = 6, Prognostic Grade Group I, involving less than  5% of one disrupted core biopsy                I  Prostate, left lateral mid, core needle biopsy:             - Small focus of atypical prostate glands, suspicious for prostatic adenocarcinoma, involving less than 5% of one core biopsy      J  Prostate, left medial mid, core needle biopsy:             - Prostatic adenocarcinoma, Boody score 3 + 4 = 7, Prognostic Grade Group II, discontinuously involving 80% of one core biopsy  Approximately 20% Faizan pattern 4               K  Prostate, left lateral apex, core needle biopsy:             - Prostatic adenocarcinoma, Boody score 3 + 3 = 6, Prognostic Grade Group I, involving 5% of one core biopsy  L  Prostate, left medial apex, core needle biopsy:             - Prostatic adenocarcinoma, Faizan 3 + 3 = 6, Prognostic Grade Group I, involving less than 5% of one core biopsy  9/23/2019 -  Cancer Staged    Staging form: Prostate, AJCC 8th Edition  - Clinical: Stage IIB (cT1c, cN0, cM0, PSA: 4 2, Grade Group: 2) - Signed by Aditya Zaman MD on 9/23/2019  Prostate specific antigen (PSA) range: Less than 10  Histologic grading system: 5 grade system  Boody score: 7       10/11/2019 -  Hormone Therapy    Firmagon on 10/11/19 and Lupron on 11/11/19 12/9/2019 - 2/11/2020 Radiation    7920cGy in 44 daily 180cGy fractions tot he prostate and proximal seminal vesicles         Review of Systems:  Review of Systems   Constitutional: Negative for appetite change and fatigue  HENT: Negative  Eyes: Positive for visual disturbance (hx of glaucoma in left eye, controlled with eye drops)  Wears glasses   Respiratory: Negative for shortness of breath  Cardiovascular: Negative  Gastrointestinal: Negative for constipation and diarrhea  Endocrine: Negative  Genitourinary: Positive for frequency (nocturia x2-3)  Negative for difficulty urinating, dysuria, hematuria and urgency          Has to push at end of stream at times to empty bladder  Denies weak stream or frequency during the day   Musculoskeletal: Positive for arthralgias (right shoulder, positional after sleeping)  Skin: Negative  Allergic/Immunologic: Negative  Neurological: Negative  Hematological: Negative  Psychiatric/Behavioral: Negative  Clinical Trial: no    IPSS Questionnaire (AUA-7): Over the past month…    1)  How often have you had a sensation of not emptying your bladder completely after you finish urinating? 3 - About half the time   2)  How often have you had to urinate again less than two hours after you finished urinating? 0 - Not at all   3)  How often have you found you stopped and started again several times when you urinated? 1 - Less than 1 time in 5   4) How difficult have you found it to postpone urination? 0 - Not at all   5) How often have you had a weak urinary stream?  2 - Less than half the time   6) How often have you had to push or strain to begin urination? 1 - Less than 1 time in 5   7) How many times did you most typically get up to urinate from the time you went to bed until the time you got up in the morning?   3 - 3 times   Total Score:  10         Health Maintenance   Topic Date Due   • COVID-19 Vaccine (6 - Pfizer series) 01/16/2023   • Fall Risk  04/25/2024   • BMI: Followup Plan  04/25/2024   • Annual Physical  04/25/2024   • Depression Screening  06/09/2024   • BMI: Adult  06/09/2024   • Colorectal Cancer Screening  07/28/2025   • DTaP,Tdap,and Td Vaccines (2 - Td or Tdap) 05/03/2026   • Hepatitis C Screening  Completed   • Pneumococcal Vaccine: 65+ Years  Completed   • Influenza Vaccine  Completed   • HIB Vaccine  Aged Out   • IPV Vaccine  Aged Out   • Hepatitis A Vaccine  Aged Out   • Meningococcal ACWY Vaccine  Aged Out   • HPV Vaccine  Aged Out     Patient Active Problem List   Diagnosis   • Mixed hyperlipidemia   • Prediabetes   • Prostate cancer [facility])     Past Medical History:   Diagnosis Date   • Benign neoplasm of colon    • Cancer [facility])     Prostate CA   • Colon polyp    • Colon, diverticulosis    • ED (erectile dysfunction) of organic origin    • Former tobacco use    • Mixed hyperlipidemia    • Obesity    • Prediabetes    • Prostate cancer [facility])      Past Surgical History:   Procedure Laterality Date   • COLONOSCOPY  10/30/2014   • COLONOSCOPY     • COLONOSCOPY     • PROSTATE BIOPSY     • RADIOACTIVE PLAQUE INSERTION N/A 2019    Procedure: TISSUE MARKER INSERTION, SPACEOAR INSERTION;  Surgeon: Vivi Blackwell MD;  Location: [facility]  MAIN [facility];  Service: Urology   • VENTRAL HERNIA REPAIR       Family History   Problem Relation Age of Onset   • Breast cancer Mother    • Heart disease Father         Cardiac pacemaker   • Cancer Sister    • Stroke Brother      Social History     Socioeconomic History   • Marital status: /Civil Union     Spouse name: Not on file   • Number of children: Not on file   • Years of education: Not on file   • Highest education level: Not on file   Occupational History   • Not on file   Tobacco Use   • Smoking status: Former     Packs/day: 0 25     Years: 6 00     Total pack years: 1 50     Types: Cigarettes     Quit date: 1978     Years since quittin 4   • Smokeless tobacco: Never   Vaping Use   • Vaping Use: Never used   Substance and Sexual Activity   • Alcohol use: Yes     Comment: socially   • Drug use: No   • Sexual activity: Yes     Partners: Female   Other Topics Concern   • Not on file   Social History Narrative   • Not on file     Social Determinants of Health     Financial Resource Strain: Not on file   Food Insecurity: Not on file   Transportation Needs: Not on file   Physical Activity: Insufficiently Active (2019)    Exercise Vital Sign    • Days of Exercise per Week: 7 days    • Minutes of Exercise per Session: 10 min   Stress: No Stress Concern Present (2019)    [employer]  [employer] - Occupational Stress Questionnaire    • Feeling of Stress : Not at all   Social Connections: Not on file   Intimate Partner Violence: Not on file   Housing Stability: Not on file       Current Outpatient Medications:   •  Acetaminophen 325 MG CAPS, Take by mouth as needed , Disp: , Rfl:   •  ASPIRIN 81 PO, Take by mouth, Disp: , Rfl:   •  Multiple Vitamin (MULTIVITAMINS PO), Take by mouth daily , Disp: , Rfl:   •  XELPROS 0 005 % EMUL, INSTILL 1 DROP DAILY IN EACH EYE AS DIRECTED, Disp: , Rfl: 3  No Known Allergies  Vitals:    06/09/23 0856   BP: 140/84   BP Location: Left arm   Pulse: 85   Resp: 18   Temp: 98 °F (36 7 °C)   TempSrc: Temporal   SpO2: 95%   Weight: 108 kg (238 lb)

## 2023-06-09 NOTE — PROGRESS NOTES
Consultation - Radiation Oncology      BSS:[id number] : 1950  Encounter: [id number]  Patient Information: [address]  Chief Complaint   Patient presents with   • Follow-up     Radiation Oncology      Cancer Staging   Prostate cancer [facility])  Staging form: Prostate, AJCC 8th Edition  - Clinical: Stage IIB (cT1c, cN0, cM0, PSA: 4 2, Grade Group: 2) - Signed by Jacque Bergman MD on 2019  Prostate specific antigen (PSA) range: Less than 10  Faizan score: 7  Histologic grading system: 5 grade system           History of Present Illness   Kennedi Mistry is a 67 y o male with a history of clinical stage T1c prostatic adenocarcinoma, [place] score 7 (3+4) with pretreatment PSA of 4 2ng/mL   He completed a course of definitive radiation with concurrent 6 months of ADT on 20  He returns today for follow-up evaluation      4/3/23 Urology, Radha Adhikari  T1c, pretreatment PSA of 4 2   - S/p definitive radiation with concurrent 6 months of ADT on 20  Last Lupron injection was on 19    - Most recent PSA from 11/15/22 was stable at 0 4  - Next PSA due in May 2023  If stable, follow up in 2023 with another PSA prior to visit       Component PSA, Total   Latest Ref Rng & Units 0 0 - 4 0 ng/mL   2021 0 3   2022 0 3   11/15/2022 0 4   2023 0 4     Patient has no new complaints today  He denies bone pain  He denies significant urinary symptoms including hematuria, dysuria, or urinary incontinence  He continues to experience nocturia 2-3 times per night  He also notes needing to push slightly to empty his bladder fully, but then is able to empty his bladder fully and well  IPSS score has worsened from 4 to 10 today, primarily due to obstructive symptoms  He, however, does not feel symptoms are changed    He denies diarrhea or rectal bleeding      Upcomin23 Urology, Radha Adhikari      Historical Information   Oncology History   Prostate cancer [facility])   2019 Initial Diagnosis    Prostate cancer ([facility] [address] )     8/23/2019 Biopsy    Final Diagnosis  A  Prostate, right lateral base, core needle biopsy: Benign prostate glands  No malignancy is identified  B  Prostate, right medial base, core needle biopsy: Benign prostate glands  No malignancy is identified  C  Prostate, right lateral mid, core needle biopsy: Focal high-grade PIN  No prostatic adenocarcinoma is identified  D  Prostate, right medial mid, core needle biopsy:Benign prostate glands  No malignancy is identified  E  Prostate, right lateral apex, core needle biopsy: Benign prostate glands  No malignancy is identified  F  Prostate, right medial apex, core needle biopsy: Benign prostate glands  No malignancy is identified      G  Prostate, left lateral base, core needle biopsy:             - Prostatic adenocarcinoma, Faizan score 3 + 4 = 7, Prognostic Grade Group II, discontinuously involving 60% of one core biopsy  Approximately 10% Faizan pattern 4                         H  Prostate, left medial, core needle biopsy: Prostatic adenocarcinoma, [place] score 3 + 3 = 6, Prognostic Grade Group I, involving less than  5% of one disrupted core biopsy                I  Prostate, left lateral mid, core needle biopsy:             - Small focus of atypical prostate glands, suspicious for prostatic adenocarcinoma, involving less than 5% of one core biopsy      J  Prostate, left medial mid, core needle biopsy:             - Prostatic adenocarcinoma, Faizan score 3 + 4 = 7, Prognostic Grade Group II, discontinuously involving 80% of one core biopsy  Approximately 20% [place] pattern 4               K  Prostate, left lateral apex, core needle biopsy:             - Prostatic adenocarcinoma, [place] score 3 + 3 = 6, Prognostic Grade Group I, involving 5% of one core biopsy                 L  Prostate, left medial apex, core needle biopsy:             - Prostatic adenocarcinoma, Faizan 3 + 3 = 6, Prognostic Grade Group I, involving less than 5% of one core biopsy            2019 -  Cancer Staged    Staging form: Prostate, AJCC 8th Edition  - Clinical: Stage IIB (cT1c, cN0, cM0, PSA: 4 2, Grade Group: 2) - Signed by Maggie Oliver MD on 2019  Prostate specific antigen (PSA) range: Less than 10  Histologic grading system: 5 grade system  Tyler score: 7       10/11/2019 -  Hormone Therapy    Firmagon on 10/11/19 and Lupron on 19 - 2020 Radiation    7920cGy in 44 daily 180cGy fractions tot he prostate and proximal seminal vesicles           Past Medical History:   Diagnosis Date   • Benign neoplasm of colon    • Cancer ([address] )     Prostate CA   • Colon polyp    • Colon, diverticulosis    • ED (erectile dysfunction) of organic origin    • Former tobacco use    • Mixed hyperlipidemia    • Obesity    • Prediabetes    • Prostate cancer [facility])      Past Surgical History:   Procedure Laterality Date   • COLONOSCOPY  10/30/2014   • COLONOSCOPY     • COLONOSCOPY     • PROSTATE BIOPSY     • RADIOACTIVE PLAQUE INSERTION N/A 2019    Procedure: TISSUE MARKER INSERTION, SPACEOAR INSERTION;  Surgeon: Hair Her MD;  Location: [facility];  Service: Urology   • VENTRAL HERNIA REPAIR         Family History   Problem Relation Age of Onset   • Breast cancer Mother    • Heart disease Father         Cardiac pacemaker   • Cancer Sister    • Stroke Brother        Social History   Social History     Substance and Sexual Activity   Alcohol Use Yes    Comment: socially     Social History     Substance and Sexual Activity   Drug Use No     Social History     Tobacco Use   Smoking Status Former   • Packs/day: 0 25   • Years: 6 00   • Total pack years: 1 50   • Types: Cigarettes   • Quit date: 1978   • Years since quittin 4   Smokeless Tobacco Never         Meds/Allergies     Current Outpatient Medications:   •  Acetaminophen 325 MG CAPS, Take by mouth as needed , Disp: , Rfl:   •  ASPIRIN 81 PO, Take by mouth, Disp: , Rfl:   •  Multiple Vitamin (MULTIVITAMINS PO), Take by mouth daily , Disp: , Rfl:   •  XELPROS 0 005 % EMUL, INSTILL 1 DROP DAILY IN EACH EYE AS DIRECTED, Disp: , Rfl: 3  No Known Allergies      Review of Systems   Constitutional: Negative for appetite change and fatigue  HENT: Negative  Eyes: Positive for visual disturbance (hx of glaucoma in left eye, controlled with eye drops)  Wears glasses   Respiratory: Negative for shortness of breath  Cardiovascular: Negative  Gastrointestinal: Negative for constipation and diarrhea  Endocrine: Negative  Genitourinary: Positive for frequency (nocturia x2-3)  Negative for difficulty urinating, dysuria, hematuria and urgency  Has to push at end of stream at times to empty bladder  Denies weak stream or frequency during the day   Musculoskeletal: Positive for arthralgias (right shoulder, positional after sleeping)  Skin: Negative  Allergic/Immunologic: Negative  Neurological: Negative  Hematological: Negative  Psychiatric/Behavioral: Negative  IPSS Questionnaire (AUA-7): Over the past month…     1)  How often have you had a sensation of not emptying your bladder completely after you finish urinating? 3 - About half the time   2)  How often have you had to urinate again less than two hours after you finished urinating? 0 - Not at all   3)  How often have you found you stopped and started again several times when you urinated? 1 - Less than 1 time in 5   4) How difficult have you found it to postpone urination? 0 - Not at all   5) How often have you had a weak urinary stream?  2 - Less than half the time   6) How often have you had to push or strain to begin urination? 1 - Less than 1 time in 5   7) How many times did you most typically get up to urinate from the time you went to bed until the time you got up in the morning?   3 - 3 times   Total Score:  10            OBJECTIVE:   /84 (BP Location: Left arm) Pulse 85   Temp 98 °F (36 7 °C) (Temporal)   Resp 18   Wt 108 kg (238 lb)   SpO2 95%   BMI 32 73 kg/m²   Performance Status: Karnofsky: 80 - Normal activity with effort; some signs or symptoms of disease    Physical Exam  Vitals and nursing note reviewed  Constitutional:       General: He is not in acute distress  Cardiovascular:      Rate and Rhythm: Normal rate and regular rhythm  Pulmonary:      Breath sounds: No wheezing, rhonchi or rales  Abdominal:      General: There is no distension  Palpations: Abdomen is soft  Tenderness: There is no abdominal tenderness  There is no right CVA tenderness or left CVA tenderness  Musculoskeletal:      Right lower leg: No edema  Left lower leg: No edema  Lymphadenopathy:      Cervical: No cervical adenopathy  Neurological:      Mental Status: He is alert and oriented to person, place, and time  Gait: Gait normal             ASSESSMENT  No diagnosis found  Cancer Staging   Prostate cancer [facility])  Staging form: Prostate, AJCC 8th Edition  - Clinical: Stage IIB (cT1c, cN0, cM0, PSA: 4 2, Grade Group: 2) - Signed by Debby Holcomb MD on 9/23/2019  Prostate specific antigen (PSA) range: Less than 10  Faizan score: 7  Histologic grading system: 5 grade system      PLAN/DISCUSSION  Sharif Epperson is a 67 y o   male with a history of intermediate risk prostate cancer status post 6months ADT and definitive radiation completed almost 3 5 years ago  [place] clinically [place]  Overall, he is doing well  [place] will follow-up with 6 month PSA check by Urology  His IPSS score has declined, largely related to obstructive urinary symptoms  We discussed trial treatment with tamsulosin or silodosin  He deferred at this time as he wished to avoid medication and felt he emptied fully with each urination  I have referred him back to Urology for evaluation and management if he changes his mind or if symptoms progress        He will return for follow-up in 1 "year       Sallyanne Ganser, MD  6/9/2023,11:20 AM      Portions of the record may have been created with voice recognition software  Occasional wrong word or \"sound a like\" substitutions may have occurred due to the inherent limitations of voice recognition software  Read the chart carefully and recognize, using context, where substitutions have occurred          "

## 2023-06-09 NOTE — PROGRESS NOTES
Cindy Galicia 1950 is a 67 y o  male with a history of clinical stage T1c prostatic adenocarcinoma, Faizan score 7 (3+4) with pretreatment PSA of 4 2ng/mL  He completed a course of definitive radiation with concurrent 6 months of ADT on 20  He was last seen 6/10/22 and presents today for follow up  4/3/23 Urology, [facility] [place]  T1c, pretreatment PSA of 4 2   - S/p definitive radiation with concurrent 6 months of ADT on 20  Last Lupron injection was on 19    - Most recent PSA from 11/15/22 was stable at 0 4  - Next PSA due in May 2023  If stable, follow up in 2023 with another PSA prior to visit  Component PSA, Total   Latest Ref Rng & Units 0 0 - 4 0 ng/mL   2021 0 3   2022 0 3   11/15/2022 0 4   2023 0 4       Upcomin23 U[facility], [facility]          Oncology History   Prostate cancer [facility])   2019 Initial Diagnosis    Prostate cancer ([facility] [address] )     2019 Biopsy    Final Diagnosis  A  Prostate, right lateral base, core needle biopsy: Benign prostate glands  No malignancy is identified  B  Prostate, right medial base, core needle biopsy: Benign prostate glands  No malignancy is identified  C  Prostate, right lateral mid, core needle biopsy: Focal high-grade PIN  No prostatic adenocarcinoma is identified  D  Prostate, right medial mid, core needle biopsy:Benign prostate glands  No malignancy is identified  E  Prostate, right lateral apex, core needle biopsy: Benign prostate glands  No malignancy is identified  F  Prostate, right medial apex, core needle biopsy: Benign prostate glands  No malignancy is identified      G  Prostate, left lateral base, core needle biopsy:             - Prostatic adenocarcinoma, Faizan score 3 + 4 = 7, Prognostic Grade Group II, discontinuously involving 60% of one core biopsy   Approximately 10% Faizan pattern 4                         H  Prostate, left medial, core needle biopsy: Prostatic adenocarcinoma, Faizan score 3 + 3 = 6, Prognostic Grade Group I, involving less than  5% of one disrupted core biopsy                I  Prostate, left lateral mid, core needle biopsy:             - Small focus of atypical prostate glands, suspicious for prostatic adenocarcinoma, involving less than 5% of one core biopsy      J  Prostate, left medial mid, core needle biopsy:             - Prostatic adenocarcinoma, Faizan score 3 + 4 = 7, Prognostic Grade Group II, discontinuously involving 80% of one core biopsy  Approximately 20% Huntington pattern 4               K  Prostate, left lateral apex, core needle biopsy:             - Prostatic adenocarcinoma, Faizan score 3 + 3 = 6, Prognostic Grade Group I, involving 5% of one core biopsy  L  Prostate, left medial apex, core needle biopsy:             - Prostatic adenocarcinoma, Huntington 3 + 3 = 6, Prognostic Grade Group I, involving less than 5% of one core biopsy  9/23/2019 -  Cancer Staged    Staging form: Prostate, AJCC 8th Edition  - Clinical: Stage IIB (cT1c, cN0, cM0, PSA: 4 2, Grade Group: 2) - Signed by Faith Davenport MD on 9/23/2019  Prostate specific antigen (PSA) range: Less than 10  Histologic grading system: 5 grade system  Huntington score: 7       10/11/2019 -  Hormone Therapy    Firmagon on 10/11/19 and Lupron on 11/11/19 12/9/2019 - 2/11/2020 Radiation    7920cGy in 44 daily 180cGy fractions tot he prostate and proximal seminal vesicles         Review of Systems:  Review of Systems   Constitutional: Negative for appetite change and fatigue  HENT: Negative  Eyes: Positive for visual disturbance (hx glaucoma in left eye, controlled with eye drops)  Wears glasses   Respiratory: Negative for cough and shortness of breath  Cardiovascular: Negative  Gastrointestinal: Negative for constipation and diarrhea  Endocrine: Negative  Genitourinary: Positive for frequency (nocturia x2-3 )  Negative for dysuria, hematuria and urgency     Musculoskeletal: Positive for arthralgias (right shoulder, positional after sleeping)  Skin: Negative  Allergic/Immunologic: Negative  Neurological: Negative for dizziness and headaches  Hematological: Negative  Psychiatric/Behavioral: Negative          Clinical Trial: no      Health Maintenance   Topic Date Due   • COVID-19 Vaccine (6 - Pfizer series) 01/16/2023   • Fall Risk  04/25/2024   • BMI: Followup Plan  04/25/2024   • BMI: Adult  04/25/2024   • Annual Physical  04/25/2024   • Depression Screening  06/09/2024   • Colorectal Cancer Screening  07/28/2025   • DTaP,Tdap,and Td Vaccines (2 - Td or Tdap) 05/03/2026   • Hepatitis C Screening  Completed   • Pneumococcal Vaccine: 65+ Years  Completed   • Influenza Vaccine  Completed   • HIB Vaccine  Aged Out   • IPV Vaccine  Aged Out   • Hepatitis A Vaccine  Aged Out   • Meningococcal ACWY Vaccine  Aged Out   • HPV Vaccine  Aged Out     Patient Active Problem List   Diagnosis   • Mixed hyperlipidemia   • Prediabetes   • Prostate cancer ([facility] )     Past Medical History:   Diagnosis Date   • Benign neoplasm of colon    • Cancer ([facility] )     Prostate CA   • Colon polyp    • Colon, diverticulosis    • ED (erectile dysfunction) of organic origin    • Former tobacco use    • Mixed hyperlipidemia    • Obesity    • Prediabetes    • Prostate cancer [facility])      Past Surgical History:   Procedure Laterality Date   • COLONOSCOPY  10/30/2014   • COLONOSCOPY  2004   • COLONOSCOPY  2006   • PROSTATE BIOPSY     • RADIOACTIVE PLAQUE INSERTION N/A 11/19/2019    Procedure: TISSUE MARKER INSERTION, Shahram Pali;  Surgeon: Kamilla Woodson MD;  Location: [facility];  Service: Urology   • VENTRAL HERNIA REPAIR       Family History   Problem Relation Age of Onset   • Breast cancer Mother    • Heart disease Father         Cardiac pacemaker   • Cancer Sister    • Stroke Brother      Social History     Socioeconomic History   • Marital status: /Civil Union     Spouse name: Not on file   • Number of children: Not on file   • Years of education: Not on file   • Highest education level: Not on file   Occupational History   • Not on file   Tobacco Use   • Smoking status: Former     Packs/day: 0 25     Years: 6 00     Total pack years: 1 50     Types: Cigarettes     Quit date: 1978     Years since quittin 4   • Smokeless tobacco: Never   Vaping Use   • Vaping Use: Never used   Substance and Sexual Activity   • Alcohol use: Yes     Comment: socially   • Drug use: No   • Sexual activity: Yes     Partners: Female   Other Topics Concern   • Not on file   Social History Narrative   • Not on file     Social Determinants of Health     Financial Resource Strain: Not on file   Food Insecurity: Not on file   Transportation Needs: Not on file   Physical Activity: Insufficiently Active (2019)    Exercise Vital Sign    • Days of Exercise per Week: 7 days    • Minutes of Exercise per Session: 10 min   Stress: No Stress Concern Present (2019)    [address]    • Feeling of Stress : Not at all   Social Connections: Not on file   Intimate Partner Violence: Not on file   Housing Stability: Not on file       Current Outpatient Medications:   •  Acetaminophen 325 MG CAPS, Take by mouth as needed , Disp: , Rfl:   •  ASPIRIN 81 PO, Take by mouth, Disp: , Rfl:   •  Multiple Vitamin (MULTIVITAMINS PO), Take by mouth daily , Disp: , Rfl:   •  XELPROS 0 005 % EMUL, INSTILL 1 DROP DAILY IN EACH EYE AS DIRECTED, Disp: , Rfl: 3  No Known Allergies  Vitals:    23 0856   BP: 140/84   BP Location: Left arm   Pulse: 85   Resp: 18   Temp: 98 °F (36 7 °C)   TempSrc: Temporal   SpO2: 95%   Weight: 108 kg (238 lb)

## 2023-11-03 ENCOUNTER — APPOINTMENT (OUTPATIENT)
Age: 73
End: 2023-11-03
Payer: COMMERCIAL

## 2023-11-03 DIAGNOSIS — C61 PROSTATE CANCER (HCC): ICD-10-CM

## 2023-11-03 LAB — PSA SERPL-MCNC: 0.48 NG/ML (ref 0–4)

## 2023-11-03 PROCEDURE — 84153 ASSAY OF PSA TOTAL: CPT

## 2023-11-03 PROCEDURE — 36415 COLL VENOUS BLD VENIPUNCTURE: CPT

## 2023-11-03 NOTE — PROGRESS NOTES
11/6/2023      Chief Complaint   Patient presents with    Follow-up     Prostate cancer     Assessment and Plan    1. Faizan 7 (3+4) prostate cancer   - T1c, pretreatment PSA of 4.2   - S/p definitive radiation with concurrent 6 months of ADT on 2/11/20. Last Lupron injection was on 11/11/19.   - Most recent PSA from 11/3/23 was 0.48  - Follow up in 6 months with PSA prior      History of Present Illness  Dayton Minaya is a 68 y.o. male here for follow up evaluation of  prostate cancer. He has history of [place] 7 prostate cancer and underwent radiation therapy with concurrent 6 months of ADT completed in February 2020. He is doing well and denies any complaints. Review of Systems   Constitutional:  Negative for chills and fever. Respiratory:  Negative for shortness of breath. Cardiovascular:  Negative for chest pain. Gastrointestinal:  Negative for abdominal pain. Genitourinary:  Negative for difficulty urinating, dysuria, flank pain, frequency, hematuria and urgency. Neurological:  Negative for dizziness.            AUA SYMPTOM SCORE      Flowsheet Row Most Recent Value   AUA SYMPTOM SCORE    How often have you had a sensation of not emptying your bladder completely after you finished urinating? 0 (P)     How often have you had to urinate again less than two hours after you finished urinating? 0 (P)     How often have you found you stopped and started again several times when you urinate? 0 (P)     How often have you found it difficult to postpone urination? 0 (P)     How often have you had a weak urinary stream? 0 (P)     How often have you had to push or strain to begin urination? 0 (P)     How many times did you most typically get up to urinate from the time you went to bed at night until the time you got up in the morning? 5 (P)     Quality of Life: If you were to spend the rest of your life with your urinary condition just the way it is now, how would you feel about that? 2 (P)     AUA SYMPTOM SCORE 5 (P)                Past Medical History  Past Medical History:   Diagnosis Date    Benign neoplasm of colon     Cancer ([address])     Prostate CA    Colon polyp     Colon, diverticulosis     ED (erectile dysfunction) of organic origin     Former tobacco use     Mixed hyperlipidemia     Obesity     Prediabetes     Prostate cancer [facility])        Past Social History  Past Surgical History:   Procedure Laterality Date    COLONOSCOPY  10/30/2014    COLONOSCOPY      COLONOSCOPY      PROSTATE BIOPSY      RADIOACTIVE PLAQUE INSERTION N/A 2019    Procedure: TISSUE MARKER INSERTION, SPACEOAR INSERTION;  Surgeon: Dennis Rebolledo MD;  [facility];  Service: Urology    VENTRAL HERNIA REPAIR       Social History     Tobacco Use   Smoking Status Former    Packs/day: 0.25    Years: 6.00    Total pack years: 1.50    Types: Cigarettes    Quit date: 1978    Years since quittin.8   Smokeless Tobacco Never       Past Family History  Family History   Problem Relation Age of Onset    Breast cancer Mother     Heart disease Father         Cardiac pacemaker    Cancer Sister     Stroke Brother        Past Social history  Social History     Socioeconomic History    Marital status: /Civil Union     Spouse name: Not on file    Number of children: Not on file    Years of education: Not on file    Highest education level: Not on file   Occupational History    Not on file   Tobacco Use    Smoking status: Former     Packs/day: 0.25     Years: 6.00     Total pack years: 1.50     Types: Cigarettes     Quit date: 1978     Years since quittin.8    Smokeless tobacco: Never   Vaping Use    Vaping Use: Never used   Substance and Sexual Activity    Alcohol use: Yes     Comment: socially    Drug use: No    Sexual activity: Yes     Partners: Female   Other Topics Concern    Not on file   Social History Narrative    Not on file     Social Determinants of Health     Financial Resource Strain: Not on file   Food Insecurity: Not on file   Transportation Needs: Not on file   Physical Activity: Insufficiently Active (5/20/2019)    Exercise Vital Sign     Days of Exercise per Week: 7 days     Minutes of Exercise per Session: 10 min   Stress: No Stress Concern Present (5/20/2019)    [facility]     Feeling of Stress : Not at all   Social Connections: Not on file   Intimate Partner Violence: Not on file   Housing Stability: Not on file       Current Medications  Current Outpatient Medications   Medication Sig Dispense Refill    Acetaminophen 325 MG CAPS Take by mouth as needed       ASPIRIN 81 PO Take by mouth      Multiple Vitamin (MULTIVITAMINS PO) Take by mouth daily       XELPROS 0.005 % EMUL INSTILL 1 DROP DAILY IN EACH EYE AS DIRECTED  3     No current facility-administered medications for this visit. Allergies  No Known Allergies      The following portions of the patient's history were reviewed and updated as appropriate: allergies, current medications, past medical history, past social history, past surgical history and problem list.      Vitals  Vitals:    11/06/23 0743   BP: 136/80   Pulse: 71   SpO2: 98%   Weight: 106 kg (233 lb 12.8 oz)   Height: 5' 11.5" (1.816 m)           Physical Exam  Physical Exam  Constitutional:       Appearance: Normal appearance. HENT:      Head: Normocephalic and atraumatic. Right Ear: External ear normal.      Left Ear: External ear normal.      Nose: Nose normal.   Eyes:      General: No scleral icterus. Conjunctiva/sclera: Conjunctivae normal.   Cardiovascular:      Pulses: Normal pulses. Pulmonary:      Effort: Pulmonary effort is normal.   Musculoskeletal:         General: Normal range of motion. Cervical back: Normal range of motion. Neurological:      General: No focal deficit present. Mental Status: He is alert and oriented to person, place, and time.    Psychiatric: Mood and Affect: Mood normal.         Behavior: Behavior normal.         Thought Content: Thought content normal.         Judgment: Judgment normal.           Results  No results found for this or any previous visit (from the past 1 hour(s)).]  Lab Results   Component Value Date    PSA 0.48 11/03/2023    PSA 0.4 04/25/2023    PSA 0.4 11/15/2022     Lab Results   Component Value Date    CALCIUM 9.6 04/25/2023    K 4.4 04/25/2023    CO2 24 04/25/2023     04/25/2023    BUN 16 04/25/2023    CREATININE 1.26 04/25/2023     Lab Results   Component Value Date    WBC 4.31 04/25/2023    HGB 15.3 04/25/2023    HCT 49.5 (H) 04/25/2023    MCV 87 04/25/2023     04/25/2023           Orders  No orders of the defined types were placed in this encounter.     Dieudonne Haji

## 2023-11-06 ENCOUNTER — OFFICE VISIT (OUTPATIENT)
Dept: UROLOGY | Facility: CLINIC | Age: 73
End: 2023-11-06
Payer: COMMERCIAL

## 2023-11-06 VITALS
HEART RATE: 71 BPM | BODY MASS INDEX: 31.67 KG/M2 | SYSTOLIC BLOOD PRESSURE: 136 MMHG | WEIGHT: 233.8 LBS | OXYGEN SATURATION: 98 % | DIASTOLIC BLOOD PRESSURE: 80 MMHG | HEIGHT: 72 IN

## 2023-11-06 DIAGNOSIS — C61 PROSTATE CANCER (HCC): Primary | ICD-10-CM

## 2023-11-06 PROCEDURE — 99213 OFFICE O/P EST LOW 20 MIN: CPT | Performed by: PHYSICIAN ASSISTANT

## 2024-04-29 ENCOUNTER — APPOINTMENT (OUTPATIENT)
Age: 74
End: 2024-04-29
Payer: COMMERCIAL

## 2024-04-29 ENCOUNTER — OFFICE VISIT (OUTPATIENT)
Age: 74
End: 2024-04-29
Payer: COMMERCIAL

## 2024-04-29 VITALS
HEART RATE: 73 BPM | TEMPERATURE: 95.1 F | BODY MASS INDEX: 31.34 KG/M2 | HEIGHT: 72 IN | SYSTOLIC BLOOD PRESSURE: 122 MMHG | OXYGEN SATURATION: 97 % | WEIGHT: 231.4 LBS | DIASTOLIC BLOOD PRESSURE: 76 MMHG

## 2024-04-29 DIAGNOSIS — C61 PROSTATE CANCER (HCC): ICD-10-CM

## 2024-04-29 DIAGNOSIS — Z00.00 ADULT GENERAL MEDICAL EXAMINATION: Primary | ICD-10-CM

## 2024-04-29 DIAGNOSIS — R73.03 PREDIABETES: ICD-10-CM

## 2024-04-29 DIAGNOSIS — E78.2 MIXED HYPERLIPIDEMIA: ICD-10-CM

## 2024-04-29 LAB
ALBUMIN SERPL BCP-MCNC: 4.5 G/DL (ref 3.5–5)
ALP SERPL-CCNC: 42 U/L (ref 34–104)
ALT SERPL W P-5'-P-CCNC: 19 U/L (ref 7–52)
ANION GAP SERPL CALCULATED.3IONS-SCNC: 6 MMOL/L (ref 4–13)
AST SERPL W P-5'-P-CCNC: 22 U/L (ref 13–39)
BASOPHILS # BLD AUTO: 0.05 THOUSANDS/ÂΜL (ref 0–0.1)
BASOPHILS NFR BLD AUTO: 1 % (ref 0–1)
BILIRUB SERPL-MCNC: 0.51 MG/DL (ref 0.2–1)
BUN SERPL-MCNC: 22 MG/DL (ref 5–25)
CALCIUM SERPL-MCNC: 9.4 MG/DL (ref 8.4–10.2)
CHLORIDE SERPL-SCNC: 106 MMOL/L (ref 96–108)
CHOLEST SERPL-MCNC: 191 MG/DL
CO2 SERPL-SCNC: 28 MMOL/L (ref 21–32)
CREAT SERPL-MCNC: 1.27 MG/DL (ref 0.6–1.3)
EOSINOPHIL # BLD AUTO: 0.35 THOUSAND/ÂΜL (ref 0–0.61)
EOSINOPHIL NFR BLD AUTO: 7 % (ref 0–6)
ERYTHROCYTE [DISTWIDTH] IN BLOOD BY AUTOMATED COUNT: 15.1 % (ref 11.6–15.1)
EST. AVERAGE GLUCOSE BLD GHB EST-MCNC: 126 MG/DL
GFR SERPL CREATININE-BSD FRML MDRD: 55 ML/MIN/1.73SQ M
GLUCOSE P FAST SERPL-MCNC: 104 MG/DL (ref 65–99)
HBA1C MFR BLD: 6 %
HCT VFR BLD AUTO: 49.1 % (ref 36.5–49.3)
HDLC SERPL-MCNC: 69 MG/DL
HGB BLD-MCNC: 15.5 G/DL (ref 12–17)
IMM GRANULOCYTES # BLD AUTO: 0.02 THOUSAND/UL (ref 0–0.2)
IMM GRANULOCYTES NFR BLD AUTO: 0 % (ref 0–2)
LDLC SERPL CALC-MCNC: 104 MG/DL (ref 0–100)
LYMPHOCYTES # BLD AUTO: 1.91 THOUSANDS/ÂΜL (ref 0.6–4.47)
LYMPHOCYTES NFR BLD AUTO: 42 % (ref 14–44)
MCH RBC QN AUTO: 27.1 PG (ref 26.8–34.3)
MCHC RBC AUTO-ENTMCNC: 31.6 G/DL (ref 31.4–37.4)
MCV RBC AUTO: 86 FL (ref 82–98)
MONOCYTES # BLD AUTO: 0.48 THOUSAND/ÂΜL (ref 0.17–1.22)
MONOCYTES NFR BLD AUTO: 10 % (ref 4–12)
NEUTROPHILS # BLD AUTO: 1.91 THOUSANDS/ÂΜL (ref 1.85–7.62)
NEUTS SEG NFR BLD AUTO: 40 % (ref 43–75)
NRBC BLD AUTO-RTO: 0 /100 WBCS
PLATELET # BLD AUTO: 239 THOUSANDS/UL (ref 149–390)
PMV BLD AUTO: 9.7 FL (ref 8.9–12.7)
POTASSIUM SERPL-SCNC: 4.3 MMOL/L (ref 3.5–5.3)
PROT SERPL-MCNC: 7.4 G/DL (ref 6.4–8.4)
PSA SERPL-MCNC: 0.6 NG/ML (ref 0–4)
RBC # BLD AUTO: 5.72 MILLION/UL (ref 3.88–5.62)
SODIUM SERPL-SCNC: 140 MMOL/L (ref 135–147)
TRIGL SERPL-MCNC: 92 MG/DL
WBC # BLD AUTO: 4.72 THOUSAND/UL (ref 4.31–10.16)

## 2024-04-29 PROCEDURE — 36415 COLL VENOUS BLD VENIPUNCTURE: CPT

## 2024-04-29 PROCEDURE — 1101F PT FALLS ASSESS-DOCD LE1/YR: CPT | Performed by: INTERNAL MEDICINE

## 2024-04-29 PROCEDURE — 85025 COMPLETE CBC W/AUTO DIFF WBC: CPT

## 2024-04-29 PROCEDURE — 1170F FXNL STATUS ASSESSED: CPT | Performed by: INTERNAL MEDICINE

## 2024-04-29 PROCEDURE — 80053 COMPREHEN METABOLIC PANEL: CPT

## 2024-04-29 PROCEDURE — 1160F RVW MEDS BY RX/DR IN RCRD: CPT | Performed by: INTERNAL MEDICINE

## 2024-04-29 PROCEDURE — 84153 ASSAY OF PSA TOTAL: CPT

## 2024-04-29 PROCEDURE — 1159F MED LIST DOCD IN RCRD: CPT | Performed by: INTERNAL MEDICINE

## 2024-04-29 PROCEDURE — 80061 LIPID PANEL: CPT

## 2024-04-29 PROCEDURE — 1125F AMNT PAIN NOTED PAIN PRSNT: CPT | Performed by: INTERNAL MEDICINE

## 2024-04-29 PROCEDURE — 99397 PER PM REEVAL EST PAT 65+ YR: CPT | Performed by: INTERNAL MEDICINE

## 2024-04-29 PROCEDURE — 3288F FALL RISK ASSESSMENT DOCD: CPT | Performed by: INTERNAL MEDICINE

## 2024-04-29 PROCEDURE — 3725F SCREEN DEPRESSION PERFORMED: CPT | Performed by: INTERNAL MEDICINE

## 2024-04-29 PROCEDURE — 83036 HEMOGLOBIN GLYCOSYLATED A1C: CPT

## 2024-04-29 NOTE — PROGRESS NOTES
ADULT ANNUAL PHYSICAL  [facility] PRIMARY CARE [place]    NAME: Javier Alatorre  AGE: 73 y.o. SEX: male  : 1950     DATE: 2024     Assessment and Plan:     1. Adult general medical examination    2. Prostate cancer (HCC)  Assessment & Plan:  S/p androgen deprivation therapy and radiation therapy. Has follow up with urology in May with diagnostic PSA to get done beforehand.      3. Mixed hyperlipidemia  Assessment & Plan:  Check updated lipids. Heart healthy diet encouraged.    Orders:  -     Lipid Panel with Direct LDL reflex; Future; Expected date: 2024  -     Comprehensive metabolic panel; Future; Expected date: 2024  -     CBC and differential; Future; Expected date: 2024    4. Prediabetes  Assessment & Plan:  Watch carbohydrate intake. Most recent A1c was 5.8 % on 2023.     Orders:  -     Hemoglobin A1C; Future; Expected date: 2024      Immunizations and preventive care screenings were discussed with patient today. Appropriate education was printed on patient's after visit summary.    Counseling:  Alcohol/drug use: discussed moderation in alcohol intake, the recommendations for healthy alcohol use, and avoidance of illicit drug use.  Dental Health: discussed importance of regular tooth brushing, flossing, and dental visits.  Injury prevention: discussed safety/seat belts, safety helmets, smoke detectors, carbon dioxide detectors, and smoking near bedding or upholstery.  Sexual health: discussed sexually transmitted diseases, partner selection, use of condoms, avoidance of unintended pregnancy, and contraceptive alternatives.  Exercise: the importance of regular exercise/physical activity was discussed. Recommend exercise 3-5 times per week for at least 30 minutes.       Depression Screening and Follow-up Plan: Patient was screened for depression during today's encounter. They screened negative with a PHQ-2 score of 0.      Return in  about 1 year (around 4/30/2025) for Annual Physical.     Chief Complaint:     Chief Complaint   Patient presents with    Medicare Wellness Visit     Knot on right side of head      History of Present Illness:     History of Present Illness  The patient presents for annual physical.    The patient reports a palpable mass or knot above his right eye. He denies any associated headaches or trauma to the area. He acknowledges the presence of a corn in his left eye, for which he regularly consults with his ophthalmologist. He denies experiencing chest pain, dyspnea, or palpitations. He also denies any hematuria or hematochezia. His prostate cancer has been well-managed, with a follow-up appointment scheduled with urology in 05/2024. He maintains an active lifestyle, engaging in regular yard work and stretching exercises. He denies any sleep disturbances.    Depression Screening  PHQ-2/9 Depression Screening    Little interest or pleasure in doing things: 0 - not at all  Feeling down, depressed, or hopeless: 0 - not at all  PHQ-2 Score: 0  PHQ-2 Interpretation: Negative depression screen        Review of Systems:     Review of Systems   Constitutional:  Negative for appetite change, chills, fatigue and fever.   HENT:  Negative for congestion, hearing loss, postnasal drip, rhinorrhea, sore throat, tinnitus and trouble swallowing.    Eyes:  Negative for pain, discharge, redness and visual disturbance.        Mass above right eye   Respiratory:  Negative for cough, chest tightness, shortness of breath and wheezing.    Cardiovascular:  Negative for chest pain, palpitations and leg swelling.   Gastrointestinal:  Negative for abdominal distention, abdominal pain, blood in stool, constipation, diarrhea, nausea and vomiting.   Endocrine: Negative for cold intolerance, heat intolerance, polydipsia, polyphagia and polyuria.   Genitourinary:  Negative for difficulty urinating, dysuria, frequency, hematuria and urgency.    Musculoskeletal:  Negative for arthralgias, back pain, gait problem, joint swelling, myalgias, neck pain and neck stiffness.   Skin:  Negative for color change and rash.   Neurological:  Negative for dizziness, tremors, seizures, syncope, speech difficulty, weakness, light-headedness, numbness and headaches.   Hematological:  Negative for adenopathy. Does not bruise/bleed easily.   Psychiatric/Behavioral:  Negative for agitation, behavioral problems, confusion, hallucinations, sleep disturbance and suicidal ideas. The patient is not nervous/anxious.       Past Medical History:     Past Medical History:   Diagnosis Date    Benign neoplasm of colon     Cancer (HCC)     Prostate CA    Colon polyp     Colon, diverticulosis     ED (erectile dysfunction) of organic origin     Former tobacco use     Mixed hyperlipidemia     Obesity     Prediabetes     Prostate cancer (HCC)       Past Surgical History:     Past Surgical History:   Procedure Laterality Date    COLONOSCOPY  10/30/2014    COLONOSCOPY  2004    COLONOSCOPY      PROSTATE BIOPSY      RADIOACTIVE PLAQUE INSERTION N/A 2019    Procedure: TISSUE MARKER INSERTION, SPACEOAR INSERTION;  Surgeon: Armando Matos MD;  Location[facility];  Service: Urology    VENTRAL HERNIA REPAIR        Family History:     Family History   Problem Relation Age of Onset    Breast cancer Mother     Heart disease Father         Cardiac pacemaker    Cancer Sister     Stroke Brother       Social History:     Social History     Socioeconomic History    Marital status: /Civil Union     Spouse name: None    Number of children: None    Years of education: None    Highest education level: None   Occupational History    None   Tobacco Use    Smoking status: Former     Current packs/day: 0.00     Average packs/day: 0.3 packs/day for 6.0 years (1.5 ttl pk-yrs)     Types: Cigarettes     Start date: 1972     Quit date: 1978     Years since quittin.3    Smokeless  "tobacco: Never   Vaping Use    Vaping status: Never Used   Substance and Sexual Activity    Alcohol use: Yes     Comment: socially    Drug use: No    Sexual activity: Yes     Partners: Female   Other Topics Concern    None   Social History Narrative    None     Social Determinants of Health     Financial Resource Strain: Not on file   Food Insecurity: No Food Insecurity (4/29/2024)    Hunger Vital Sign     Worried About Running Out of Food in the Last Year: Never true     Ran Out of Food in the Last Year: Never true   Transportation Needs: No Transportation Needs (4/29/2024)    PRAPARE - Transportation     Lack of Transportation (Medical): No     Lack of Transportation (Non-Medical): No   Physical Activity: Insufficiently Active (5/20/2019)    Exercise Vital Sign     Days of Exercise per Week: 7 days     Minutes of Exercise per Session: 10 min   Stress: No Stress Concern Present (5/20/2019)    [employer] - Occupational Stress Questionnaire     Feeling of Stress : Not at all   Social Connections: Not on file   Intimate Partner Violence: Not on file   Housing Stability: Low Risk  (4/29/2024)    Housing Stability Vital Sign     Unable to Pay for Housing in the Last Year: No     Number of Places Lived in the Last Year: 1     Unstable Housing in the Last Year: No      Current Medications:     Current Outpatient Medications   Medication Sig Dispense Refill    Acetaminophen 325 MG CAPS Take by mouth as needed       ASPIRIN 81 PO Take by mouth      Multiple Vitamin (MULTIVITAMINS PO) Take by mouth daily       XELPROS 0.005 % EMUL INSTILL 1 DROP DAILY IN EACH EYE AS DIRECTED  3     No current facility-administered medications for this visit.      Allergies:     No Known Allergies   Physical Exam:     /76   Pulse 73   Temp (!) 95.1 °F (35.1 °C)   Ht 5' 11.5\" (1.816 m)   Wt 105 kg (231 lb 6.4 oz)   SpO2 97%   BMI 31.82 kg/m²     Physical Exam  Constitutional:       General: He is not " in acute distress.     Appearance: He is not ill-appearing.   HENT:      Right Ear: Tympanic membrane, ear canal and external ear normal. There is no impacted cerumen.      Left Ear: Tympanic membrane, ear canal and external ear normal. There is no impacted cerumen.   Eyes:      Extraocular Movements: Extraocular movements intact.      Conjunctiva/sclera: Conjunctivae normal.      Pupils: Pupils are equal, round, and reactive to light.      Comments: Small lipoma above right eye   Cardiovascular:      Rate and Rhythm: Normal rate and regular rhythm.      Heart sounds: No murmur heard.  Pulmonary:      Effort: Pulmonary effort is normal. No respiratory distress.      Breath sounds: No wheezing.   Abdominal:      General: Bowel sounds are normal. There is no distension.      Tenderness: There is no abdominal tenderness.   Musculoskeletal:      Right lower leg: No edema.      Left lower leg: No edema.   Neurological:      General: No focal deficit present.      Mental Status: He is alert. Mental status is at baseline.      Cranial Nerves: No cranial nerve deficit.   Psychiatric:         Mood and Affect: Mood normal.         Behavior: Behavior normal.        Sae Navarro DO  [facility] PRIMARY CARE [place]

## 2024-04-29 NOTE — ASSESSMENT & PLAN NOTE
S/p androgen deprivation therapy and radiation therapy. Has follow up with urology in May with diagnostic PSA to get done beforehand.

## 2024-05-03 ENCOUNTER — APPOINTMENT (OUTPATIENT)
Dept: URBAN - METROPOLITAN AREA SURGERY 12 | Age: 74
Setting detail: DERMATOLOGY
End: 2024-05-03

## 2024-05-03 DIAGNOSIS — L57.0 ACTINIC KERATOSIS: ICD-10-CM

## 2024-05-03 DIAGNOSIS — D22 MELANOCYTIC NEVI: ICD-10-CM

## 2024-05-03 DIAGNOSIS — Z71.89 OTHER SPECIFIED COUNSELING: ICD-10-CM

## 2024-05-03 DIAGNOSIS — L42 PITYRIASIS ROSEA: ICD-10-CM

## 2024-05-03 DIAGNOSIS — L81.4 OTHER MELANIN HYPERPIGMENTATION: ICD-10-CM

## 2024-05-03 DIAGNOSIS — D18.0 HEMANGIOMA: ICD-10-CM

## 2024-05-03 DIAGNOSIS — L82.1 OTHER SEBORRHEIC KERATOSIS: ICD-10-CM

## 2024-05-03 PROBLEM — L30.9 DERMATITIS, UNSPECIFIED: Status: ACTIVE | Noted: 2024-05-03

## 2024-05-03 PROBLEM — D48.5 NEOPLASM OF UNCERTAIN BEHAVIOR OF SKIN: Status: ACTIVE | Noted: 2024-05-03

## 2024-05-03 PROBLEM — D22.5 MELANOCYTIC NEVI OF TRUNK: Status: ACTIVE | Noted: 2024-05-03

## 2024-05-03 PROBLEM — D18.01 HEMANGIOMA OF SKIN AND SUBCUTANEOUS TISSUE: Status: ACTIVE | Noted: 2024-05-03

## 2024-05-03 PROCEDURE — 17004 DESTROY PREMAL LESIONS 15/>: CPT

## 2024-05-03 PROCEDURE — OTHER REASSURANCE: OTHER

## 2024-05-03 PROCEDURE — 99203 OFFICE O/P NEW LOW 30 MIN: CPT | Mod: 25

## 2024-05-03 PROCEDURE — OTHER PRESCRIPTION: OTHER

## 2024-05-03 PROCEDURE — OTHER ADDITIONAL NOTES: OTHER

## 2024-05-03 PROCEDURE — OTHER LIQUID NITROGEN: OTHER

## 2024-05-03 PROCEDURE — OTHER BIOPSY BY SHAVE METHOD: OTHER

## 2024-05-03 PROCEDURE — OTHER COUNSELING: OTHER

## 2024-05-03 PROCEDURE — OTHER MIPS QUALITY: OTHER

## 2024-05-03 PROCEDURE — 11102 TANGNTL BX SKIN SINGLE LES: CPT | Mod: 59

## 2024-05-03 PROCEDURE — OTHER SUNSCREEN RECOMMENDATIONS: OTHER

## 2024-05-03 PROCEDURE — 11103 TANGNTL BX SKIN EA SEP/ADDL: CPT

## 2024-05-03 RX ORDER — TRIAMCINOLONE ACETONIDE 1 MG/G
CREAM TOPICAL BID
Qty: 80 | Refills: 1 | Status: ERX | COMMUNITY
Start: 2024-05-03

## 2024-05-03 ASSESSMENT — LOCATION DETAILED DESCRIPTION DERM
LOCATION DETAILED: RIGHT CENTRAL FRONTAL SCALP
LOCATION DETAILED: LEFT MID-UPPER BACK
LOCATION DETAILED: RIGHT SUPERIOR UPPER BACK
LOCATION DETAILED: POSTERIOR MID-PARIETAL SCALP
LOCATION DETAILED: RIGHT RADIAL DORSAL HAND
LOCATION DETAILED: LEFT SUPERIOR FOREHEAD
LOCATION DETAILED: RIGHT SUPERIOR HELIX
LOCATION DETAILED: RIGHT SUPERIOR LATERAL MIDBACK
LOCATION DETAILED: LEFT SUPERIOR LATERAL MALAR CHEEK
LOCATION DETAILED: RIGHT SUPERIOR FOREHEAD
LOCATION DETAILED: RIGHT INFERIOR MEDIAL LOWER BACK
LOCATION DETAILED: RIGHT SUPERIOR CRUS OF ANTIHELIX
LOCATION DETAILED: LEFT MEDIAL FRONTAL SCALP
LOCATION DETAILED: RIGHT MID PREAURICULAR CHEEK
LOCATION DETAILED: RIGHT MEDIAL UPPER BACK
LOCATION DETAILED: RIGHT SUPERIOR PARIETAL SCALP
LOCATION DETAILED: LEFT SUPERIOR LATERAL FOREHEAD
LOCATION DETAILED: RIGHT CYMBA CONCHA
LOCATION DETAILED: RIGHT INFERIOR CRUS OF ANTIHELIX
LOCATION DETAILED: RIGHT ANTIHELIX
LOCATION DETAILED: RIGHT MID-UPPER BACK

## 2024-05-03 ASSESSMENT — LOCATION SIMPLE DESCRIPTION DERM
LOCATION SIMPLE: RIGHT HAND
LOCATION SIMPLE: LEFT CHEEK
LOCATION SIMPLE: LEFT SCALP
LOCATION SIMPLE: RIGHT EAR
LOCATION SIMPLE: POSTERIOR SCALP
LOCATION SIMPLE: LEFT UPPER BACK
LOCATION SIMPLE: RIGHT LOWER BACK
LOCATION SIMPLE: RIGHT UPPER BACK
LOCATION SIMPLE: RIGHT SCALP
LOCATION SIMPLE: LEFT FOREHEAD
LOCATION SIMPLE: SCALP
LOCATION SIMPLE: RIGHT CHEEK
LOCATION SIMPLE: RIGHT FOREHEAD

## 2024-05-03 ASSESSMENT — LOCATION ZONE DERM
LOCATION ZONE: TRUNK
LOCATION ZONE: SCALP
LOCATION ZONE: FACE
LOCATION ZONE: EAR
LOCATION ZONE: HAND

## 2024-05-16 ENCOUNTER — APPOINTMENT (OUTPATIENT)
Dept: URBAN - METROPOLITAN AREA SURGERY 11 | Age: 74
Setting detail: DERMATOLOGY
End: 2024-05-16

## 2024-05-16 PROBLEM — C44.91 BASAL CELL CARCINOMA OF SKIN, UNSPECIFIED: Status: ACTIVE | Noted: 2024-05-16

## 2024-05-16 PROCEDURE — OTHER MOHS SURGERY PHONE CONSULTATION: OTHER

## 2024-05-22 NOTE — PROGRESS NOTES
5/23/2024      Chief Complaint   Patient presents with    Follow-up     Assessment and Plan    1. Faiz[place] 7 (3+4) prostate cancer   - T1c, pretreatment PSA of 4.2   - S/p definitive radiation with concurrent 6 months of ADT on 2/11/20. Last Lupron injection was on 11/11/19.   - Most recent PSA from 4/29/24 was 0.60  - Follow up in 6 months with PSA prior    History of Present Illness  Javier Alatorre is a 73 y.o. male here for follow up evaluation of  prostate cancer.  He has history of [place] 7 prostate cancer and underwent radiation therapy with concurrent 6 months of ADT completed in February 2020.  He denies any urinary issues or complaints.     Review of Systems   Constitutional:  Negative for chills and fever.   Respiratory:  Negative for shortness of breath.    Cardiovascular:  Negative for chest pain.   Gastrointestinal:  Negative for abdominal pain.   Genitourinary:  Negative for difficulty urinating, dysuria, flank pain, frequency, hematuria and urgency.   Neurological:  Negative for dizziness.       AUA SYMPTOM SCORE      Flowsheet Row Most Recent Value   AUA SYMPTOM SCORE    How often have you had a sensation of not emptying your bladder completely after you finished urinating? 0 (P)     How often have you had to urinate again less than two hours after you finished urinating? 2 (P)     How often have you found you stopped and started again several times when you urinate? 0 (P)     How often have you found it difficult to postpone urination? 0 (P)     How often have you had a weak urinary stream? 0 (P)     How often have you had to push or strain to begin urination? 1 (P)     How many times did you most typically get up to urinate from the time you went to bed at night until the time you got up in the morning? 4 (P)     Quality of Life: If you were to spend the rest of your life with your urinary condition just the way it is now, how would you feel about that? 2 (P)     AUA SYMPTOM SCORE 7 (P)                  Past Medical History  Past Medical History:   Diagnosis Date    Benign neoplasm of colon     Cancer (HCC)     Prostate CA    Colon polyp     Colon, diverticulosis     ED (erectile dysfunction) of organic origin     Former tobacco use     Mixed hyperlipidemia     Obesity     Prediabetes     Prostate cancer (HCC)        Past Social History  Past Surgical History:   Procedure Laterality Date    COLONOSCOPY  10/30/2014    COLONOSCOPY  2004    COLONOSCOPY      PROSTATE BIOPSY      RADIOACTIVE PLAQUE INSERTION N/A 2019    Procedure: TISSUE MARKER INSERTION, SPACEOAR INSERTION;  Surgeon: Armando Matos MD;  Location: [facility];  Service: Urology    VENTRAL HERNIA REPAIR       Social History     Tobacco Use   Smoking Status Former    Current packs/day: 0.00    Average packs/day: 0.3 packs/day for 6.0 years (1.5 ttl pk-yrs)    Types: Cigarettes    Start date: 1972    Quit date: 1978    Years since quittin.4   Smokeless Tobacco Never       Past Family History  Family History   Problem Relation Age of Onset    Breast cancer Mother     Heart disease Father         Cardiac pacemaker    Cancer Sister     Stroke Brother        Past Social history  Social History     Socioeconomic History    Marital status: /Civil Union     Spouse name: Not on file    Number of children: Not on file    Years of education: Not on file    Highest education level: Not on file   Occupational History    Not on file   Tobacco Use    Smoking status: Former     Current packs/day: 0.00     Average packs/day: 0.3 packs/day for 6.0 years (1.5 ttl pk-yrs)     Types: Cigarettes     Start date: 1972     Quit date: 1978     Years since quittin.4    Smokeless tobacco: Never   Vaping Use    Vaping status: Never Used   Substance and Sexual Activity    Alcohol use: Yes     Comment: socially    Drug use: No    Sexual activity: Yes     Partners: Female   Other Topics Concern    Not on file   Social History  "Narrative    Not on file     Social Determinants of Health     Financial Resource Strain: Not on file   Food Insecurity: No Food Insecurity (4/29/2024)    Hunger Vital Sign     Worried About Running Out of Food in the Last Year: Never true     Ran Out of Food in the Last Year: Never true   Transportation Needs: No Transportation Needs (4/29/2024)    PRAPARE - Transportation     Lack of Transportation (Medical): No     Lack of Transportation (Non-Medical): No   Physical Activity: Insufficiently Active (5/20/2019)    Exercise Vital Sign     Days of Exercise per Week: 7 days     Minutes of Exercise per Session: 10 min   Stress: No Stress Concern Present (5/20/2019)    [employer] [place] [employer] - Occupational Stress Questionnaire     Feeling of Stress : Not at all   Social Connections: Not on file   Intimate Partner Violence: Not on file   Housing Stability: Unknown (4/29/2024)    Housing Stability Vital Sign     Unable to Pay for Housing in the Last Year: No     Number of Times Moved in the Last Year: Not on file     Homeless in the Last Year: Not on file       Current Medications  Current Outpatient Medications   Medication Sig Dispense Refill    Acetaminophen 325 MG CAPS Take by mouth as needed       ASPIRIN 81 PO Take by mouth      Multiple Vitamin (MULTIVITAMINS PO) Take by mouth daily       Tafluprost, PF, 0.0015 % SOLN Administer 1 drop to both eyes daily at bedtime      XELPROS 0.005 % EMUL INSTILL 1 DROP DAILY IN EACH EYE AS DIRECTED  3     No current facility-administered medications for this visit.       Allergies  No Known Allergies      The following portions of the patient's history were reviewed and updated as appropriate: allergies, current medications, past medical history, past social history, past surgical history and problem list.      Vitals  Vitals:    05/23/24 0746   BP: 123/78   Pulse: 99   Resp: 16   Temp: 99.1 °F (37.3 °C)   SpO2: 97%   Weight: 107 kg (235 lb)   Height: 5' 11\" " (1.803 m)           Physical Exam  Physical Exam  Constitutional:       Appearance: Normal appearance.   HENT:      Head: Normocephalic and atraumatic.      Right Ear: External ear normal.      Left Ear: External ear normal.      Nose: Nose normal.   Eyes:      General: No scleral icterus.     Conjunctiva/sclera: Conjunctivae normal.   Cardiovascular:      Pulses: Normal pulses.   Pulmonary:      Effort: Pulmonary effort is normal.   Musculoskeletal:         General: Normal range of motion.      Cervical back: Normal range of motion.   Neurological:      General: No focal deficit present.      Mental Status: He is alert and oriented to person, place, and time.   Psychiatric:         Mood and Affect: Mood normal.         Behavior: Behavior normal.         Thought Content: Thought content normal.         Judgment: Judgment normal.           Results  No results found for this or any previous visit (from the past 1 hour(s)).]  Lab Results   Component Value Date    PSA 0.60 04/29/2024    PSA 0.48 11/03/2023    PSA 0.4 04/25/2023     Lab Results   Component Value Date    CALCIUM 9.4 04/29/2024    K 4.3 04/29/2024    CO2 28 04/29/2024     04/29/2024    BUN 22 04/29/2024    CREATININE 1.27 04/29/2024     Lab Results   Component Value Date    WBC 4.72 04/29/2024    HGB 15.5 04/29/2024    HCT 49.1 04/29/2024    MCV 86 04/29/2024     04/29/2024           Orders  No orders of the defined types were placed in this encounter.  Perla Sorenson

## 2024-05-23 ENCOUNTER — OFFICE VISIT (OUTPATIENT)
Dept: UROLOGY | Facility: CLINIC | Age: 74
End: 2024-05-23
Payer: COMMERCIAL

## 2024-05-23 VITALS
TEMPERATURE: 99.1 F | OXYGEN SATURATION: 97 % | SYSTOLIC BLOOD PRESSURE: 123 MMHG | RESPIRATION RATE: 16 BRPM | DIASTOLIC BLOOD PRESSURE: 78 MMHG | BODY MASS INDEX: 32.9 KG/M2 | WEIGHT: 235 LBS | HEIGHT: 71 IN | HEART RATE: 99 BPM

## 2024-05-23 DIAGNOSIS — C61 PROSTATE CANCER (HCC): Primary | ICD-10-CM

## 2024-05-23 PROCEDURE — 99213 OFFICE O/P EST LOW 20 MIN: CPT | Performed by: PHYSICIAN ASSISTANT

## 2024-05-23 RX ORDER — TAFLUPROST OPTHALMIC 0 MG/.3ML
1 SOLUTION/ DROPS OPHTHALMIC
COMMUNITY
Start: 2024-04-30

## 2024-06-19 ENCOUNTER — APPOINTMENT (OUTPATIENT)
Dept: URBAN - METROPOLITAN AREA SURGERY 11 | Age: 74
Setting detail: DERMATOLOGY
End: 2024-06-19

## 2024-06-19 PROBLEM — C44.41 BASAL CELL CARCINOMA OF SKIN OF SCALP AND NECK: Status: ACTIVE | Noted: 2024-06-19

## 2024-06-19 PROCEDURE — 13132 CMPLX RPR F/C/C/M/N/AX/G/H/F: CPT

## 2024-06-19 PROCEDURE — OTHER MOHS SURGERY: OTHER

## 2024-06-19 PROCEDURE — 17311 MOHS 1 STAGE H/N/HF/G: CPT

## 2024-07-01 ENCOUNTER — OFFICE VISIT (OUTPATIENT)
Dept: RADIATION ONCOLOGY | Facility: CLINIC | Age: 74
End: 2024-07-01
Attending: RADIOLOGY
Payer: COMMERCIAL

## 2024-07-01 VITALS
OXYGEN SATURATION: 96 % | BODY MASS INDEX: 33.25 KG/M2 | HEART RATE: 82 BPM | SYSTOLIC BLOOD PRESSURE: 132 MMHG | DIASTOLIC BLOOD PRESSURE: 84 MMHG | RESPIRATION RATE: 16 BRPM | HEIGHT: 71 IN | TEMPERATURE: 96.8 F | WEIGHT: 237.5 LBS

## 2024-07-01 DIAGNOSIS — C61 PROSTATE CANCER (HCC): Primary | ICD-10-CM

## 2024-07-01 PROCEDURE — 99213 OFFICE O/P EST LOW 20 MIN: CPT | Performed by: RADIOLOGY

## 2024-07-01 PROCEDURE — 99211 OFF/OP EST MAY X REQ PHY/QHP: CPT | Performed by: RADIOLOGY

## 2024-07-01 PROCEDURE — G0463 HOSPITAL OUTPT CLINIC VISIT: HCPCS | Performed by: RADIOLOGY

## 2024-07-01 NOTE — PROGRESS NOTES
Javier Alatorre 1950 is a 73 y.o. male with a history of clinical stage T1c prostatic adenocarcinoma, [place] score 7 (3+4) with pretreatment PSA of 4.2ng/mL.  He completed a course of definitive radiation with concurrent 6 months of ADT on 20.  He was last seen 23 and returns today for follow-up evaluation.       [address] [facility]  1. [place] 7 (3+4) prostate cancer   - T1c, pretreatment PSA of 4.2   - S/p definitive radiation with concurrent 6 months of ADT on 20. Last Lupron injection was on 19.   - Most recent PSA from 24 was 0.60  - Follow up in 6 months with PSA prior       PSA   Latest Ref Rng 0.00 - 4.00 ng/mL   2023 0.4    11/3/2023 0.48    2024 0.60        Upcomin[facility]    Follow up visit     Oncology History   Prostate cancer (HCC)   2019 Initial Diagnosis    Prostate cancer (HCC)     2019 Biopsy    Final Diagnosis  A. Prostate, right lateral base, core needle biopsy: Benign prostate glands. No malignancy is identified.  B. Prostate, right medial base, core needle biopsy: Benign prostate glands. No malignancy is identified.  C. Prostate, right lateral mid, core needle biopsy: Focal high-grade PIN. No prostatic adenocarcinoma is identified.  D. Prostate, right medial mid, core needle biopsy:Benign prostate glands. No malignancy is identified.  E. Prostate, right lateral apex, core needle biopsy: Benign prostate glands. No malignancy is identified.  F. Prostate, right medial apex, core needle biopsy: Benign prostate glands. No malignancy is identified.     G. Prostate, left lateral base, core needle biopsy:             - Prostatic adenocarcinoma, [place] score 3 + 4 = 7, Prognostic Grade Group II, discontinuously involving 60% of one core biopsy. Approximately 10% [place] pattern 4.                        H. Prostate, left medial, core needle biopsy: Prostatic adenocarcinoma, [place] score 3 + 3 = 6, Prognostic Grade Group I,  involving less than  5% of one disrupted core biopsy.               I. Prostate, left lateral mid, core needle biopsy:             - Small focus of atypical prostate glands, suspicious for prostatic adenocarcinoma, involving less than 5% of one core biopsy.     J. Prostate, left medial mid, core needle biopsy:             - Prostatic adenocarcinoma, [place] score 3 + 4 = 7, Prognostic Grade Group II, discontinuously involving 80% of one core biopsy. Approximately 20% [place] pattern 4.              K. Prostate, left lateral apex, core needle biopsy:             - Prostatic adenocarcinoma, [place] score 3 + 3 = 6, Prognostic Grade Group I, involving 5% of one core biopsy.               L. Prostate, left medial apex, core needle biopsy:             - Prostatic adenocarcinoma, [place] 3 + 3 = 6, Prognostic Grade Group I, involving less than 5% of one core biopsy.          9/23/2019 -  Cancer Staged    Staging form: Prostate, AJCC 8th Edition  - Clinical: Stage IIB (cT1c, cN0, cM0, PSA: 4.2, Grade Group: 2) - Signed by Meggan Renner MD on 9/23/2019  Prostate specific antigen (PSA) range: Less than 10  Histologic grading system: 5 grade system  [place] score: 7       10/11/2019 -  Hormone Therapy    Firmagon on 10/11/19 and Lupron on 11/11/19 12/9/2019 - 2/11/2020 Radiation    7920cGy in 44 daily 180cGy fractions tot he prostate and proximal seminal vesicles         Review of Systems:  Review of Systems   Constitutional: Negative.    HENT: Negative.     Eyes: Negative.    Respiratory: Negative.     Cardiovascular: Negative.    Gastrointestinal: Negative.    Endocrine: Negative.    Genitourinary: Negative.  Negative for dysuria.        Nocturia x 3   Musculoskeletal: Negative.    Skin: Negative.    Allergic/Immunologic: Negative.    Neurological: Negative.    Hematological: Negative.    Psychiatric/Behavioral: Negative.         Clinical Trial: no    IPSS Questionnaire (AUA-7):  Over the past month…    1)  How often have  you had a sensation of not emptying your bladder completely after you finish urinating?  1 - Less than 1 time in 5   2)  How often have you had to urinate again less than two hours after you finished urinating? 3 - About half the time   3)  How often have you found you stopped and started again several times when you urinated?  0 - Not at all   4) How difficult have you found it to postpone urination?  0 - Not at all   5) How often have you had a weak urinary stream?  0 - Not at all   6) How often have you had to push or strain to begin urination?  2 - Less than half the time   7) How many times did you most typically get up to urinate from the time you went to bed until the time you got up in the morning?  3 - 3 times   Total Score:  9           Health Maintenance   Topic Date Due    RSV Vaccine Age 60+ Years (1 - 1-dose 60+ series) Never done    BMI: Followup Plan  04/25/2024    Influenza Vaccine (1) 09/01/2024    Fall Risk  04/29/2025    Annual Physical  04/29/2025    BMI: Adult  05/23/2025    Depression Screening  07/01/2025    Colorectal Cancer Screening  07/28/2025    DTaP,Tdap,and Td Vaccines (2 - Td or Tdap) 05/03/2026    Hepatitis C Screening  Completed    Zoster Vaccine  Completed    Pneumococcal Vaccine: 65+ Years  Completed    COVID-19 Vaccine  Completed    RSV Vaccine age 0-20 Months  Aged Out    HIB Vaccine  Aged Out    IPV Vaccine  Aged Out    Hepatitis A Vaccine  Aged Out    Meningococcal ACWY Vaccine  Aged Out    HPV Vaccine  Aged Out     Patient Active Problem List   Diagnosis    Mixed hyperlipidemia    Prediabetes    Prostate cancer (HCC)     Past Medical History:   Diagnosis Date    Benign neoplasm of colon     Cancer (HCC)     Prostate CA    Colon polyp     Colon, diverticulosis     ED (erectile dysfunction) of organic origin     Former tobacco use     Mixed hyperlipidemia     Obesity     Prediabetes     Prostate cancer (HCC)      Past Surgical History:   Procedure Laterality Date    COLONOSCOPY   10/30/2014    COLONOSCOPY  2004    COLONOSCOPY  2006    PROSTATE BIOPSY      RADIOACTIVE PLAQUE INSERTION N/A 2019    Procedure: TISSUE MARKER INSERTION, SPACEOAR INSERTION;  Surgeon: Armando Matos MD;  Location: [facility];  Service: Urology    VENTRAL HERNIA REPAIR       Family History   Problem Relation Age of Onset    Breast cancer Mother     Heart disease Father         Cardiac pacemaker    Cancer Sister     Stroke Brother      Social History     Socioeconomic History    Marital status: /Civil Union     Spouse name: Not on file    Number of children: Not on file    Years of education: Not on file    Highest education level: Not on file   Occupational History    Not on file   Tobacco Use    Smoking status: Former     Current packs/day: 0.00     Average packs/day: 0.3 packs/day for 6.0 years (1.5 ttl pk-yrs)     Types: Cigarettes     Start date: 1972     Quit date: 1978     Years since quittin.5    Smokeless tobacco: Never   Vaping Use    Vaping status: Never Used   Substance and Sexual Activity    Alcohol use: Yes     Comment: socially    Drug use: No    Sexual activity: Yes     Partners: Female   Other Topics Concern    Not on file   Social History Narrative    Not on file     Social Determinants of Health     Financial Resource Strain: Not on file   Food Insecurity: No Food Insecurity (2024)    Hunger Vital Sign     Worried About Running Out of Food in the Last Year: Never true     Ran Out of Food in the Last Year: Never true   Transportation Needs: No Transportation Needs (2024)    PRAPARE - Transportation     Lack of Transportation (Medical): No     Lack of Transportation (Non-Medical): No   Physical Activity: Insufficiently Active (2019)    Exercise Vital Sign     Days of Exercise per Week: 7 days     Minutes of Exercise per Session: 10 min   Stress: No Stress Concern Present (2019)    [employer] [place] [employer] - Occupational Stress  "Questionnaire     Feeling of Stress : Not at all   Social Connections: Not on file   Intimate Partner Violence: Not on file   Housing Stability: Low Risk  (4/29/2024)    Housing Stability Vital Sign     Unable to Pay for Housing in the Last Year: No     Number of Times Moved in the Last Year: 1     Homeless in the Last Year: No       Current Outpatient Medications:     Acetaminophen 325 MG CAPS, Take by mouth as needed , Disp: , Rfl:     ASPIRIN 81 PO, Take by mouth, Disp: , Rfl:     Multiple Vitamin (MULTIVITAMINS PO), Take by mouth daily , Disp: , Rfl:     Tafluprost, PF, 0.0015 % SOLN, Administer 1 drop to both eyes daily at bedtime, Disp: , Rfl:     XELPROS 0.005 % EMUL, INSTILL 1 DROP DAILY IN EACH EYE AS DIRECTED, Disp: , Rfl: 3  No Known Allergies  Vitals:    07/01/24 0854   BP: 132/84   Pulse: 82   Resp: 16   Temp: (!) 96.8 °F (36 °C)   SpO2: 96%   Weight: 108 kg (237 lb 8 oz)   Height: 5' 11\" (1.803 m)      Pain Score: 0-No pain  "

## 2024-07-01 NOTE — LETTER
July 1, 2024     Perla Sorenson PA-C  [address] [place] [address]   [address]  [facility] [id number]    Patient: Javier Alatorre   YOB: 1950   Date of Visit: 7/1/2024       Dear Dr. Sorenson:    Thank you for referring Javier Alatorre to me for evaluation. Below are my notes for this consultation.    If you have questions, please do not hesitate to call me. I look forward to following your patient along with you.         Sincerely,        Meggan Renner MD        CC: No Recipients    Meggan Renner MD  7/1/2024  9:19 AM  Sign when Signing Visit  Follow-up - Radiation Oncology   Javier Alatorre 1950 73 y.o. male [id number]      History of Present Illness  Cancer Staging   Prostate cancer (HCC)  Staging form: Prostate, AJCC 8th Edition  - Clinical: Stage IIB (cT1c, cN0, cM0, PSA: 4.2, Grade Group: 2) - Signed by Meggan Renner MD on 9/23/2019  Prostate specific antigen (PSA) range: Less than 10  [place] score: 7  Histologic grading system: 5 grade system      Javier Alatorre is a 73 y.o. man with a history of clinical stage T1c prostatic adenocarcinoma, [place] score 7 (3+4) with pretreatment PSA of 4.2ng/mL.  He completed a course of definitive radiation with concurrent 6 months of ADT on 2/11/20.  He was last seen 6/9/23 and returns today for follow-up evaluation.      5/23/24 [facility], [facility]  1. Faizan 7 (3+4) prostate cancer   - T1c, pretreatment PSA of 4.2   - S/p definitive radiation with concurrent 6 months of ADT on 2/11/20. Last Lupron injection was on 11/11/19.   - Most recent PSA from 4/29/24 was 0.60  - Follow up in 6 months with PSA prior       PSA   Latest Ref Rng 0.00 - 4.00 ng/mL   4/25/2023 0.4    11/3/2023 0.48    4/29/2024 0.60      The patient offers no new  or GI complaints today.  He denies any dysuria, hematuria, or urinary continence.  IPSS score is stable at 9.  He does continue to have some obstructive urinary symptoms and nocturia 3 times per night.  He does not take any medications for this.  He denies any  diarrhea, rectal bleeding, or significant change in his bowel movements.  He denies any new areas or progressive areas of bone pain.     Up[facility]        Historical Information  Oncology History   Prostate cancer (HCC)   2019 Initial Diagnosis    Prostate cancer (HCC)     2019 Biopsy    Final Diagnosis  A. Prostate, right lateral base, core needle biopsy: Benign prostate glands. No malignancy is identified.  B. Prostate, right medial base, core needle biopsy: Benign prostate glands. No malignancy is identified.  C. Prostate, right lateral mid, core needle biopsy: Focal high-grade PIN. No prostatic adenocarcinoma is identified.  D. Prostate, right medial mid, core needle biopsy:Benign prostate glands. No malignancy is identified.  E. Prostate, right lateral apex, core needle biopsy: Benign prostate glands. No malignancy is identified.  F. Prostate, right medial apex, core needle biopsy: Benign prostate glands. No malignancy is identified.     G. Prostate, left lateral base, core needle biopsy:             - Prostatic adenocarcinoma, [place] score 3 + 4 = 7, Prognostic Grade Group II, discontinuously involving 60% of one core biopsy. Approximately 10% [place] pattern 4.                        H. Prostate, left medial, core needle biopsy: Prostatic adenocarcinoma, [place] score 3 + 3 = 6, Prognostic Grade Group I, involving less than  5% of one disrupted core biopsy.               I. Prostate, left lateral mid, core needle biopsy:             - Small focus of atypical prostate glands, suspicious for prostatic adenocarcinoma, involving less than 5% of one core biopsy.     J. Prostate, left medial mid, core needle biopsy:             - Prostatic adenocarcinoma, [place] score 3 + 4 = 7, Prognostic Grade Group II, discontinuously involving 80% of one core biopsy. Approximately 20% Faizan pattern 4.              K. Prostate, left lateral apex, core needle biopsy:             - Prostatic  adenocarcinoma, Faizan score 3 + 3 = 6, Prognostic Grade Group I, involving 5% of one core biopsy.               L. Prostate, left medial apex, core needle biopsy:             - Prostatic adenocarcinoma, Faizan 3 + 3 = 6, Prognostic Grade Group I, involving less than 5% of one core biopsy.          2019 -  Cancer Staged    Staging form: Prostate, AJCC 8th Edition  - Clinical: Stage IIB (cT1c, cN0, cM0, PSA: 4.2, Grade Group: 2) - Signed by Meggan Renner MD on 2019  Prostate specific antigen (PSA) range: Less than 10  Histologic grading system: 5 grade system  Faizan score: 7       10/11/2019 -  Hormone Therapy    Firmagon on 10/11/19 and Lupron on 19 - 2020 Radiation    7920cGy in 44 daily 180cGy fractions tot he prostate and proximal seminal vesicles         Past Medical History:   Diagnosis Date   • Benign neoplasm of colon    • Cancer (HCC)     Prostate CA   • Colon polyp    • Colon, diverticulosis    • ED (erectile dysfunction) of organic origin    • Former tobacco use    • Mixed hyperlipidemia    • Obesity    • Prediabetes    • Prostate cancer (HCC)      Past Surgical History:   Procedure Laterality Date   • COLONOSCOPY  10/30/2014   • COLONOSCOPY     • COLONOSCOPY     • PROSTATE BIOPSY     • RADIOACTIVE PLAQUE INSERTION N/A 2019    Procedure: TISSUE MARKER INSERTION, SPACEOAR INSERTION;  Surgeon: Armando Matos MD;  Location: [facility];  Service: Urology   • VENTRAL HERNIA REPAIR         Social History  Social History     Substance and Sexual Activity   Alcohol Use Yes    Comment: socially     Social History     Substance and Sexual Activity   Drug Use No     Social History     Tobacco Use   Smoking Status Former   • Current packs/day: 0.00   • Average packs/day: 0.3 packs/day for 6.0 years (1.5 ttl pk-yrs)   • Types: Cigarettes   • Start date: 1972   • Quit date: 1978   • Years since quittin.5   Smokeless Tobacco Never  "        Meds/Allergies    Current Outpatient Medications:   •  Acetaminophen 325 MG CAPS, Take by mouth as needed , Disp: , Rfl:   •  ASPIRIN 81 PO, Take by mouth, Disp: , Rfl:   •  Multiple Vitamin (MULTIVITAMINS PO), Take by mouth daily , Disp: , Rfl:   •  Tafluprost, PF, 0.0015 % SOLN, Administer 1 drop to both eyes daily at bedtime, Disp: , Rfl:   •  XELPROS 0.005 % EMUL, INSTILL 1 DROP DAILY IN EACH EYE AS DIRECTED, Disp: , Rfl: 3  No Known Allergies      Review of Systems   Constitutional: Negative.    HENT: Negative.     Eyes: Negative.    Respiratory: Negative.     Cardiovascular: Negative.    Gastrointestinal: Negative.    Endocrine: Negative.    Genitourinary: Negative.  Negative for dysuria.        Nocturia x 3   Musculoskeletal: Negative.    Skin: Negative.    Allergic/Immunologic: Negative.    Neurological: Negative.    Hematological: Negative.    Psychiatric/Behavioral: Negative.       IPSS Questionnaire (AUA-7):  Over the past month…     1)  How often have you had a sensation of not emptying your bladder completely after you finish urinating?  1 - Less than 1 time in 5   2)  How often have you had to urinate again less than two hours after you finished urinating? 3 - About half the time   3)  How often have you found you stopped and started again several times when you urinated?  0 - Not at all   4) How difficult have you found it to postpone urination?  0 - Not at all   5) How often have you had a weak urinary stream?  0 - Not at all   6) How often have you had to push or strain to begin urination?  2 - Less than half the time   7) How many times did you most typically get up to urinate from the time you went to bed until the time you got up in the morning?  3 - 3 times   Total Score:  9          OBJECTIVE:   /84   Pulse 82   Temp (!) 96.8 °F (36 °C)   Resp 16   Ht 5' 11\" (1.803 m)   Wt 108 kg (237 lb 8 oz)   SpO2 96%   BMI 33.12 kg/m²   Karnofsky: 90 - Able to carry on normal activity; " "minor signs or symptoms of disease     Physical Exam  Vitals and nursing note reviewed.   Constitutional:       General: He is not in acute distress.     Appearance: He is well-developed.   Cardiovascular:      Rate and Rhythm: Normal rate and regular rhythm.   Pulmonary:      Breath sounds: No wheezing, rhonchi or rales.   Abdominal:      Palpations: Abdomen is soft.      Tenderness: There is no abdominal tenderness. There is no right CVA tenderness or left CVA tenderness.   Musculoskeletal:         General: No tenderness (no spinal tenderness to percussion.).      Right lower leg: No edema.      Left lower leg: No edema.   Lymphadenopathy:      Cervical: No cervical adenopathy.      Upper Body:      Right upper body: No supraclavicular adenopathy.      Left upper body: No supraclavicular adenopathy.   Neurological:      Mental Status: He is alert and oriented to person, place, and time.      Gait: Gait normal.              Assessment/Plan:  Javier Alatorre is a 73 y.o. male with a history of intermediate risk prostate cancer status post 6months ADT and definitive radiation completed almost 4.5 years ago.      He remains biochemically MARITA.     There has been a slow rise in PSA since April 2023 and this continues.    I agree with follow-up PSA in 6 months and the follow-up with Urology.  Given recent rise, we discussed discharge to Urology with follow-up with us prn versus follow-up in 1 year.  I would favor another year of follow-up and he agreed.     His IPSS score is stable and he is not interested in medications for his symptoms.    Meggan Renner MD  7/1/2024,9:05 AM    Portions of the record may have been created with voice recognition software.  Occasional wrong word or \"sound a like\" substitutions may have occurred due to the inherent limitations of voice recognition software.  Read the chart carefully and recognize, using context, where substitutions have occurred.           "

## 2024-07-01 NOTE — PROGRESS NOTES
Follow-up - Radiation Oncology   Javier Alatorre 1950 73 y.o. male [id number]      History of Present Illness   Cancer Staging   Prostate cancer ([facility])  Staging form: Prostate, AJCC 8th Edition  - Clinical: Stage IIB (cT1c, cN0, cM0, PSA: 4.2, Grade Group: 2) - Signed by Meggan Renner MD on 2019  Prostate specific antigen (PSA) range: Less than 10  Faizan score: 7  Histologic grading system: 5 grade system      Javier Alatorre is a 73 y.o. man with a history of clinical stage T1c prostatic adenocarcinoma, [place] score 7 (3+4) with pretreatment PSA of 4.2ng/mL.  He completed a course of definitive radiation with concurrent 6 months of ADT on 20.  He was last seen 23 and returns today for follow-up evaluation.      [facility]  1. Faizan 7 (3+4) prostate cancer   - T1c, pretreatment PSA of 4.2   - S/p definitive radiation with concurrent 6 months of ADT on 20. Last Lupron injection was on 19.   - Most recent PSA from 24 was 0.60  - Follow up in 6 months with PSA prior       PSA   Latest Ref Rng 0.00 - 4.00 ng/mL   2023 0.4    11/3/2023 0.48    2024 0.60      The patient offers no new  or GI complaints today.  He denies any dysuria, hematuria, or urinary continence.  IPSS score is stable at 9.  He does continue to have some obstructive urinary symptoms and nocturia 3 times per night.  He does not take any medications for this.  He denies any diarrhea, rectal bleeding, or significant change in his bowel movements.  He denies any new areas or progressive areas of bone pain.     Upcomin24 [facility]        Historical Information   Oncology History   Prostate cancer ([facility])   2019 Initial Diagnosis    Prostate cancer ([facility])     2019 Biopsy    Final Diagnosis  A. Prostate, right lateral base, core needle biopsy: Benign prostate glands. No malignancy is identified.  B. Prostate, right medial base, core needle biopsy: Benign prostate glands. No  malignancy is identified.  C. Prostate, right lateral mid, core needle biopsy: Focal high-grade PIN. No prostatic adenocarcinoma is identified.  D. Prostate, right medial mid, core needle biopsy:Benign prostate glands. No malignancy is identified.  E. Prostate, right lateral apex, core needle biopsy: Benign prostate glands. No malignancy is identified.  F. Prostate, right medial apex, core needle biopsy: Benign prostate glands. No malignancy is identified.     G. Prostate, left lateral base, core needle biopsy:             - Prostatic adenocarcinoma, [place] score 3 + 4 = 7, Prognostic Grade Group II, discontinuously involving 60% of one core biopsy. Approximately 10% Faizan pattern 4.                        H. Prostate, left medial, core needle biopsy: Prostatic adenocarcinoma, [place] score 3 + 3 = 6, Prognostic Grade Group I, involving less than  5% of one disrupted core biopsy.               I. Prostate, left lateral mid, core needle biopsy:             - Small focus of atypical prostate glands, suspicious for prostatic adenocarcinoma, involving less than 5% of one core biopsy.     J. Prostate, left medial mid, core needle biopsy:             - Prostatic adenocarcinoma, Faizan score 3 + 4 = 7, Prognostic Grade Group II, discontinuously involving 80% of one core biopsy. Approximately 20% Faizan pattern 4.              K. Prostate, left lateral apex, core needle biopsy:             - Prostatic adenocarcinoma, [place]iz[place] score 3 + 3 = 6, Prognostic Grade Group I, involving 5% of one core biopsy.               L. Prostate, left medial apex, core needle biopsy:             - Prostatic adenocarcinoma, [place] 3 + 3 = 6, Prognostic Grade Group I, involving less than 5% of one core biopsy.          9/23/2019 -  Cancer Staged    Staging form: Prostate, AJCC 8th Edition  - Clinical: Stage IIB (cT1c, cN0, cM0, PSA: 4.2, Grade Group: 2) - Signed by Meggan Renner MD on 9/23/2019  Prostate specific antigen (PSA) range: Less than  10  Histologic grading system: 5 grade system  Faizan score: 7       10/11/2019 -  Hormone Therapy    Firmagon on 10/11/19 and Lupron on 19 - 2020 Radiation    7920cGy in 44 daily 180cGy fractions tot he prostate and proximal seminal vesicles         Past Medical History:   Diagnosis Date    Benign neoplasm of colon     Cancer (HCC)     Prostate CA    Colon polyp     Colon, diverticulosis     ED (erectile dysfunction) of organic origin     Former tobacco use     Mixed hyperlipidemia     Obesity     Prediabetes     Prostate cancer (HCC)      Past Surgical History:   Procedure Laterality Date    COLONOSCOPY  10/30/2014    COLONOSCOPY      COLONOSCOPY      PROSTATE BIOPSY      RADIOACTIVE PLAQUE INSERTION N/A 2019    Procedure: TISSUE MARKER INSERTION, SPACEOAR INSERTION;  Surgeon: Armando Matos MD;  [facility];  Service: Urology    VENTRAL HERNIA REPAIR         Social History   Social History     Substance and Sexual Activity   Alcohol Use Yes    Comment: socially     Social History     Substance and Sexual Activity   Drug Use No     Social History     Tobacco Use   Smoking Status Former    Current packs/day: 0.00    Average packs/day: 0.3 packs/day for 6.0 years (1.5 ttl pk-yrs)    Types: Cigarettes    Start date: 1972    Quit date: 1978    Years since quittin.5   Smokeless Tobacco Never         Meds/Allergies     Current Outpatient Medications:     Acetaminophen 325 MG CAPS, Take by mouth as needed , Disp: , Rfl:     ASPIRIN 81 PO, Take by mouth, Disp: , Rfl:     Multiple Vitamin (MULTIVITAMINS PO), Take by mouth daily , Disp: , Rfl:     Tafluprost, PF, 0.0015 % SOLN, Administer 1 drop to both eyes daily at bedtime, Disp: , Rfl:     XELPROS 0.005 % EMUL, INSTILL 1 DROP DAILY IN EACH EYE AS DIRECTED, Disp: , Rfl: 3  No Known Allergies      Review of Systems   Constitutional: Negative.    HENT: Negative.     Eyes: Negative.    Respiratory:  "Negative.     Cardiovascular: Negative.    Gastrointestinal: Negative.    Endocrine: Negative.    Genitourinary: Negative.  Negative for dysuria.        Nocturia x 3   Musculoskeletal: Negative.    Skin: Negative.    Allergic/Immunologic: Negative.    Neurological: Negative.    Hematological: Negative.    Psychiatric/Behavioral: Negative.       IPSS Questionnaire (AUA-7):  Over the past month…     1)  How often have you had a sensation of not emptying your bladder completely after you finish urinating?  1 - Less than 1 time in 5   2)  How often have you had to urinate again less than two hours after you finished urinating? 3 - About half the time   3)  How often have you found you stopped and started again several times when you urinated?  0 - Not at all   4) How difficult have you found it to postpone urination?  0 - Not at all   5) How often have you had a weak urinary stream?  0 - Not at all   6) How often have you had to push or strain to begin urination?  2 - Less than half the time   7) How many times did you most typically get up to urinate from the time you went to bed until the time you got up in the morning?  3 - 3 times   Total Score:  9          OBJECTIVE:   /84   Pulse 82   Temp (!) 96.8 °F (36 °C)   Resp 16   Ht 5' 11\" (1.803 m)   Wt 108 kg (237 lb 8 oz)   SpO2 96%   BMI 33.12 kg/m²   Karnofsky: 90 - Able to carry on normal activity; minor signs or symptoms of disease     Physical Exam  Vitals and nursing note reviewed.   Constitutional:       General: He is not in acute distress.     Appearance: He is well-developed.   Cardiovascular:      Rate and Rhythm: Normal rate and regular rhythm.   Pulmonary:      Breath sounds: No wheezing, rhonchi or rales.   Abdominal:      Palpations: Abdomen is soft.      Tenderness: There is no abdominal tenderness. There is no right CVA tenderness or left CVA tenderness.   Musculoskeletal:         General: No tenderness (no spinal tenderness to " "percussion.).      Right lower leg: No edema.      Left lower leg: No edema.   Lymphadenopathy:      Cervical: No cervical adenopathy.      Upper Body:      Right upper body: No supraclavicular adenopathy.      Left upper body: No supraclavicular adenopathy.   Neurological:      Mental Status: He is alert and oriented to person, place, and time.      Gait: Gait normal.              Assessment/Plan:  Javier Alatorre is a 73 y.o. male with a history of intermediate risk prostate cancer status post 6months ADT and definitive radiation completed almost 4.5 years ago.      He remains biochemically MARITA.     There has been a slow rise in PSA since April 2023 and this continues.    I agree with follow-up PSA in 6 months and the follow-up with Urology.  Given recent rise, we discussed discharge to Urology with follow-up with us prn versus follow-up in 1 year.  I would favor another year of follow-up and he agreed.     His IPSS score is stable and he is not interested in medications for his symptoms.    Meggan Renner MD  7/1/2024,9:05 AM    Portions of the record may have been created with voice recognition software.  Occasional wrong word or \"sound a like\" substitutions may have occurred due to the inherent limitations of voice recognition software.  Read the chart carefully and recognize, using context, where substitutions have occurred.        "

## 2024-07-03 ENCOUNTER — APPOINTMENT (OUTPATIENT)
Dept: URBAN - METROPOLITAN AREA SURGERY 11 | Age: 74
Setting detail: DERMATOLOGY
End: 2024-07-03

## 2024-07-03 DIAGNOSIS — Z48.02 ENCOUNTER FOR REMOVAL OF SUTURES: ICD-10-CM

## 2024-07-03 PROCEDURE — OTHER SUTURE REMOVAL (GLOBAL PERIOD): OTHER

## 2024-07-03 PROCEDURE — 99024 POSTOP FOLLOW-UP VISIT: CPT

## 2024-07-03 ASSESSMENT — LOCATION SIMPLE DESCRIPTION DERM: LOCATION SIMPLE: POSTERIOR NECK

## 2024-07-03 ASSESSMENT — LOCATION ZONE DERM: LOCATION ZONE: NECK

## 2024-07-03 ASSESSMENT — LOCATION DETAILED DESCRIPTION DERM: LOCATION DETAILED: LEFT POSTERIOR NECK

## 2024-11-21 ENCOUNTER — APPOINTMENT (OUTPATIENT)
Age: 74
End: 2024-11-21
Payer: COMMERCIAL

## 2024-11-21 ENCOUNTER — TELEPHONE (OUTPATIENT)
Dept: UROLOGY | Facility: CLINIC | Age: 74
End: 2024-11-21

## 2024-11-21 DIAGNOSIS — C61 PROSTATE CANCER (HCC): ICD-10-CM

## 2024-11-21 LAB — PSA SERPL-MCNC: 0.71 NG/ML (ref 0–4)

## 2024-11-21 PROCEDURE — 84153 ASSAY OF PSA TOTAL: CPT

## 2024-11-21 PROCEDURE — 36415 COLL VENOUS BLD VENIPUNCTURE: CPT

## 2024-11-25 NOTE — PROGRESS NOTES
11/26/2024      Chief Complaint   Patient presents with    Follow-up         Assessment and Plan    1. Faizan 7 (3+4) prostate cancer   - T1c, pretreatment PSA of 4.2   - S/p definitive radiation with concurrent 6 months of ADT on 2/11/20. Last Lupron injection was on 11/11/19.   - Most recent PSA from 11/21/24 was 0.713  - Follow up in 6 months with PSA prior    History of Present Illness  Javier Alatorre is a 74 y.o. male here for follow up evaluation of prostate cancer. He has history of Faizan 7 prostate cancer and underwent radiation therapy with concurrent 6 months of ADT completed in February 2020.  He denies any complaints or issues.         Review of Systems   Constitutional:  Negative for chills and fever.   Respiratory:  Negative for shortness of breath.    Cardiovascular:  Negative for chest pain.   Gastrointestinal:  Negative for abdominal pain.   Genitourinary:  Negative for difficulty urinating, dysuria, flank pain, frequency, hematuria and urgency.   Neurological:  Negative for dizziness.           AUA SYMPTOM SCORE      Flowsheet Row Most Recent Value   AUA SYMPTOM SCORE    How often have you had a sensation of not emptying your bladder completely after you finished urinating? 0 (P)     How often have you had to urinate again less than two hours after you finished urinating? 0 (P)     How often have you found you stopped and started again several times when you urinate? 0 (P)     How often have you found it difficult to postpone urination? 0 (P)     How often have you had a weak urinary stream? 0 (P)     How often have you had to push or strain to begin urination? 0 (P)     How many times did you most typically get up to urinate from the time you went to bed at night until the time you got up in the morning? 3 (P)     Quality of Life: If you were to spend the rest of your life with your urinary condition just the way it is now, how would you feel about that? 2 (P)     AUA SYMPTOM SCORE 3 (P)                  Past Medical History  Past Medical History:   Diagnosis Date    Benign neoplasm of colon     Cancer (HCC)     Prostate CA    Colon polyp     Colon, diverticulosis     ED (erectile dysfunction) of organic origin     Former tobacco use     Mixed hyperlipidemia     Obesity     Prediabetes     Prostate cancer (HCC)        Past Social History  Past Surgical History:   Procedure Laterality Date    COLONOSCOPY  10/30/2014    COLONOSCOPY  2004    COLONOSCOPY      PROSTATE BIOPSY      RADIOACTIVE PLAQUE INSERTION N/A 2019    Procedure: TISSUE MARKER INSERTION, SPACEOAR INSERTION;  Surgeon: Armando Matos MD;  Location: [facility];  Service: Urology    VENTRAL HERNIA REPAIR       Social History     Tobacco Use   Smoking Status Former    Current packs/day: 0.00    Average packs/day: 0.3 packs/day for 6.0 years (1.5 ttl pk-yrs)    Types: Cigarettes    Start date: 1972    Quit date: 1978    Years since quittin.9   Smokeless Tobacco Never       Past Family History  Family History   Problem Relation Age of Onset    Breast cancer Mother     Heart disease Father         Cardiac pacemaker    Cancer Sister     Stroke Brother        Past Social history  Social History     Socioeconomic History    Marital status: /Civil Union     Spouse name: Not on file    Number of children: Not on file    Years of education: Not on file    Highest education level: Not on file   Occupational History    Not on file   Tobacco Use    Smoking status: Former     Current packs/day: 0.00     Average packs/day: 0.3 packs/day for 6.0 years (1.5 ttl pk-yrs)     Types: Cigarettes     Start date: 1972     Quit date: 1978     Years since quittin.9    Smokeless tobacco: Never   Vaping Use    Vaping status: Never Used   Substance and Sexual Activity    Alcohol use: Yes     Comment: socially    Drug use: No    Sexual activity: Yes     Partners: Female   Other Topics Concern    Not on file   Social History  Narrative    Not on file     Social Drivers of Health     Financial Resource Strain: Not on file   Food Insecurity: No Food Insecurity (4/29/2024)    Nursing - Inadequate Food Risk Classification     Worried About Running Out of Food in the Last Year: Never true     Ran Out of Food in the Last Year: Never true     Ran Out of Food in the Last Year: Not on file   Transportation Needs: No Transportation Needs (4/29/2024)    PRAPARE - Transportation     Lack of Transportation (Medical): No     Lack of Transportation (Non-Medical): No   Physical Activity: Insufficiently Active (5/20/2019)    Exercise Vital Sign     Days of Exercise per Week: 7 days     Minutes of Exercise per Session: 10 min   Stress: No Stress Concern Present (5/20/2019)    [employer] - Occupational Stress Questionnaire     Feeling of Stress : Not at all   Social Connections: Not on file   Intimate Partner Violence: Not on file   Housing Stability: Low Risk  (4/29/2024)    Housing Stability Vital Sign     Unable to Pay for Housing in the Last Year: No     Number of Times Moved in the Last Year: 1     Homeless in the Last Year: No       Current Medications  Current Outpatient Medications   Medication Sig Dispense Refill    Acetaminophen 325 MG CAPS Take by mouth as needed       ASPIRIN 81 PO Take by mouth      Multiple Vitamin (MULTIVITAMINS PO) Take by mouth daily       Tafluprost, PF, 0.0015 % SOLN Administer 1 drop to both eyes daily at bedtime      XELPROS 0.005 % EMUL INSTILL 1 DROP DAILY IN EACH EYE AS DIRECTED  3     No current facility-administered medications for this visit.       Allergies  No Known Allergies      The following portions of the patient's history were reviewed and updated as appropriate: allergies, current medications, past medical history, past social history, past surgical history and problem list.      Vitals  Vitals:    11/26/24 0733   BP: 142/86   BP Location: Right arm   Patient Position:  "Sitting   Cuff Size: Large   Pulse: 76   Temp: 97.6 °F (36.4 °C)   TempSrc: Temporal   SpO2: 97%   Weight: 104 kg (229 lb)   Height: 5' 11\" (1.803 m)           Physical Exam  Physical Exam  Constitutional:       Appearance: Normal appearance.   HENT:      Head: Normocephalic and atraumatic.      Right Ear: External ear normal.      Left Ear: External ear normal.      Nose: Nose normal.   Eyes:      General: No scleral icterus.     Conjunctiva/sclera: Conjunctivae normal.   Pulmonary:      Effort: Pulmonary effort is normal.   Musculoskeletal:         General: Normal range of motion.      Cervical back: Normal range of motion.   Neurological:      General: No focal deficit present.      Mental Status: He is alert and oriented to person, place, and time.   Psychiatric:         Mood and Affect: Mood normal.         Behavior: Behavior normal.         Thought Content: Thought content normal.         Judgment: Judgment normal.           Results  No results found for this or any previous visit (from the past hour).]  Lab Results   Component Value Date    PSA 0.713 11/21/2024    PSA 0.60 04/29/2024    PSA 0.48 11/03/2023     Lab Results   Component Value Date    CALCIUM 9.4 04/29/2024    K 4.3 04/29/2024    CO2 28 04/29/2024     04/29/2024    BUN 22 04/29/2024    CREATININE 1.27 04/29/2024     Lab Results   Component Value Date    WBC 4.72 04/29/2024    HGB 15.5 04/29/2024    HCT 49.1 04/29/2024    MCV 86 04/29/2024     04/29/2024           Orders  Orders Placed This Encounter   Procedures    PSA Total, Diagnostic     Standing Status:   Future     Expected Date:   5/1/2025     Expiration Date:   11/26/2025       Perla Sorenson    "

## 2024-11-26 ENCOUNTER — OFFICE VISIT (OUTPATIENT)
Dept: UROLOGY | Facility: CLINIC | Age: 74
End: 2024-11-26
Payer: COMMERCIAL

## 2024-11-26 VITALS
DIASTOLIC BLOOD PRESSURE: 86 MMHG | HEIGHT: 71 IN | WEIGHT: 229 LBS | BODY MASS INDEX: 32.06 KG/M2 | TEMPERATURE: 97.6 F | SYSTOLIC BLOOD PRESSURE: 142 MMHG | HEART RATE: 76 BPM | OXYGEN SATURATION: 97 %

## 2024-11-26 DIAGNOSIS — C61 PROSTATE CANCER (HCC): Primary | ICD-10-CM

## 2024-11-26 PROCEDURE — 99213 OFFICE O/P EST LOW 20 MIN: CPT | Performed by: PHYSICIAN ASSISTANT

## 2025-05-07 ENCOUNTER — TELEPHONE (OUTPATIENT)
Age: 75
End: 2025-05-07

## 2025-05-15 ENCOUNTER — APPOINTMENT (OUTPATIENT)
Dept: LAB | Facility: HOSPITAL | Age: 75
End: 2025-05-15
Payer: COMMERCIAL

## 2025-05-15 DIAGNOSIS — C61 PROSTATE CANCER (HCC): ICD-10-CM

## 2025-05-15 LAB — PSA SERPL-MCNC: 0.54 NG/ML (ref 0–4)

## 2025-05-15 PROCEDURE — 84153 ASSAY OF PSA TOTAL: CPT

## 2025-05-15 PROCEDURE — 36415 COLL VENOUS BLD VENIPUNCTURE: CPT

## 2025-05-23 NOTE — ASSESSMENT & PLAN NOTE
[place] 7 (3+4) prostate cancer   - T1c, pretreatment PSA of 4.2   - S/p definitive radiation with concurrent 6 months of ADT on 2/11/20.   - PSA from 5/15/25 stable at 0.542  - Follow up in 1 year with PSA  Orders:    PSA Total, Diagnostic; Future

## 2025-05-27 ENCOUNTER — OFFICE VISIT (OUTPATIENT)
Dept: UROLOGY | Facility: CLINIC | Age: 75
End: 2025-05-27
Payer: COMMERCIAL

## 2025-05-27 VITALS
TEMPERATURE: 97.6 F | HEIGHT: 71 IN | HEART RATE: 89 BPM | WEIGHT: 233 LBS | SYSTOLIC BLOOD PRESSURE: 142 MMHG | DIASTOLIC BLOOD PRESSURE: 82 MMHG | OXYGEN SATURATION: 100 % | BODY MASS INDEX: 32.62 KG/M2

## 2025-05-27 DIAGNOSIS — C61 PROSTATE CANCER (HCC): Primary | ICD-10-CM

## 2025-05-27 PROCEDURE — 99213 OFFICE O/P EST LOW 20 MIN: CPT | Performed by: PHYSICIAN ASSISTANT

## 2025-05-27 RX ORDER — PREDNISOLONE ACETATE 10 MG/ML
SUSPENSION/ DROPS OPHTHALMIC
COMMUNITY
Start: 2025-04-08

## 2025-06-17 ENCOUNTER — APPOINTMENT (OUTPATIENT)
Dept: URBAN - METROPOLITAN AREA SURGERY 12 | Age: 75
Setting detail: DERMATOLOGY
End: 2025-06-17

## 2025-06-17 DIAGNOSIS — L57.0 ACTINIC KERATOSIS: ICD-10-CM

## 2025-06-17 DIAGNOSIS — L57.8 OTHER SKIN CHANGES DUE TO CHRONIC EXPOSURE TO NONIONIZING RADIATION: ICD-10-CM

## 2025-06-17 DIAGNOSIS — L81.4 OTHER MELANIN HYPERPIGMENTATION: ICD-10-CM

## 2025-06-17 DIAGNOSIS — D18.0 HEMANGIOMA: ICD-10-CM

## 2025-06-17 DIAGNOSIS — L82.1 OTHER SEBORRHEIC KERATOSIS: ICD-10-CM

## 2025-06-17 DIAGNOSIS — D22 MELANOCYTIC NEVI: ICD-10-CM

## 2025-06-17 DIAGNOSIS — Z85.828 PERSONAL HISTORY OF OTHER MALIGNANT NEOPLASM OF SKIN: ICD-10-CM

## 2025-06-17 DIAGNOSIS — L11.1 TRANSIENT ACANTHOLYTIC DERMATOSIS [GROVER]: ICD-10-CM

## 2025-06-17 PROBLEM — D22.5 MELANOCYTIC NEVI OF TRUNK: Status: ACTIVE | Noted: 2025-06-17

## 2025-06-17 PROBLEM — D18.01 HEMANGIOMA OF SKIN AND SUBCUTANEOUS TISSUE: Status: ACTIVE | Noted: 2025-06-17

## 2025-06-17 PROCEDURE — OTHER LIQUID NITROGEN: OTHER

## 2025-06-17 PROCEDURE — OTHER REASSURANCE: OTHER

## 2025-06-17 PROCEDURE — 17004 DESTROY PREMAL LESIONS 15/>: CPT

## 2025-06-17 PROCEDURE — 99213 OFFICE O/P EST LOW 20 MIN: CPT | Mod: 25

## 2025-06-17 PROCEDURE — OTHER SUNSCREEN RECOMMENDATIONS: OTHER

## 2025-06-17 PROCEDURE — OTHER COUNSELING: OTHER

## 2025-06-17 ASSESSMENT — LOCATION SIMPLE DESCRIPTION DERM
LOCATION SIMPLE: RIGHT UPPER BACK
LOCATION SIMPLE: ANTERIOR SCALP
LOCATION SIMPLE: LEFT SCALP
LOCATION SIMPLE: RIGHT SCALP
LOCATION SIMPLE: LEFT UPPER BACK
LOCATION SIMPLE: LEFT EAR
LOCATION SIMPLE: SCALP

## 2025-06-17 ASSESSMENT — LOCATION DETAILED DESCRIPTION DERM
LOCATION DETAILED: LEFT SUPERIOR MEDIAL UPPER BACK
LOCATION DETAILED: LEFT MEDIAL FRONTAL SCALP
LOCATION DETAILED: LEFT MID-UPPER BACK
LOCATION DETAILED: MID-FRONTAL SCALP
LOCATION DETAILED: RIGHT INFERIOR MEDIAL UPPER BACK
LOCATION DETAILED: LEFT SUPERIOR CRUS OF ANTIHELIX
LOCATION DETAILED: LEFT CENTRAL FRONTAL SCALP
LOCATION DETAILED: LEFT SUPERIOR PARIETAL SCALP
LOCATION DETAILED: LEFT SUPERIOR UPPER BACK
LOCATION DETAILED: RIGHT MEDIAL FRONTAL SCALP
LOCATION DETAILED: RIGHT SUPERIOR UPPER BACK

## 2025-06-17 ASSESSMENT — LOCATION ZONE DERM
LOCATION ZONE: SCALP
LOCATION ZONE: EAR
LOCATION ZONE: TRUNK

## 2025-07-01 ENCOUNTER — OFFICE VISIT (OUTPATIENT)
Dept: RADIATION ONCOLOGY | Facility: CLINIC | Age: 75
End: 2025-07-01
Attending: RADIOLOGY
Payer: COMMERCIAL

## 2025-07-01 VITALS
RESPIRATION RATE: 16 BRPM | WEIGHT: 227 LBS | HEART RATE: 77 BPM | OXYGEN SATURATION: 98 % | TEMPERATURE: 97.3 F | SYSTOLIC BLOOD PRESSURE: 144 MMHG | BODY MASS INDEX: 31.66 KG/M2 | DIASTOLIC BLOOD PRESSURE: 78 MMHG

## 2025-07-01 DIAGNOSIS — Z85.46 ENCOUNTER FOR FOLLOW-UP SURVEILLANCE OF PROSTATE CANCER: Primary | ICD-10-CM

## 2025-07-01 DIAGNOSIS — Z08 ENCOUNTER FOR FOLLOW-UP SURVEILLANCE OF PROSTATE CANCER: Primary | ICD-10-CM

## 2025-07-01 PROCEDURE — G0463 HOSPITAL OUTPT CLINIC VISIT: HCPCS | Performed by: RADIOLOGY

## 2025-07-01 PROCEDURE — 99211 OFF/OP EST MAY X REQ PHY/QHP: CPT | Performed by: RADIOLOGY

## 2025-07-01 PROCEDURE — 99213 OFFICE O/P EST LOW 20 MIN: CPT | Performed by: RADIOLOGY

## 2025-07-01 NOTE — PROGRESS NOTES
Follow-up Visit   Name: Javier Alatorre      : 1950      MRN: [id number]  Encounter Provider: Meggan Renner MD  Encounter Date: 2025   Encounter department: [facility] RADIATION ONCOLOGY  :  Assessment & Plan  Encounter for follow-up surveillance of prostate cancer  Javier Alatorre 1950 is a 74 y.o. male  with a history of clinical stage T1c prostatic adenocarcinoma, Faizan score 7 (3+4) with pretreatment PSA of 4.2ng/mL.  He completed a course of definitive radiation with concurrent 6 months of ADT on 20.      Clinically MAR[facility] more than 5 years post radiation.  - PSA from 5/15/2025 remains stable at 0.54 ng/mL.    - He continues to follow annually with PSA check the urology clinic, which is appropriate  - He has no new or concerning progression of  or GI symptoms.  - Discharge patient to follow-up as needed and continue annual surveillance with urology clinic.  We reviewed that biochemical failure for the patient whose elkin was 0.3 ng/mL would be a PSA of 2.3 ng/mL.  If the patient should have rising PSA approaching or exceeding this level, he should be referred back for active evaluation.    Otherwise we will see the patient as needed.  He is given the chance to ask questions were answered to his satisfaction.         History of Present Illness   Chief Complaint   Patient presents with    Prostate Cancer    Follow-up   Pertinent Medical History   Javier Alatorre 1950 is a 74 y.o. male  with a history of clinical stage T1c prostatic adenocarcinoma, [place] score 7 (3+4) with pretreatment PSA of 4.2ng/mL.  He completed a course of definitive radiation with concurrent 6 months of ADT on 20.  He was last seen 24 and returns today for follow-up evaluation.       25 [facility], [facility]  Faizan 7 (3+4) prostate cancer   - T1c, pretreatment PSA of 4.2   - S/p definitive radiation with concurrent 6 months of ADT on 20.   - PSA from 5/15/25 stable at 0.542  - Follow up in 1  year with PSA       PSA   Latest Ref Rng 0.000 - 4.000 ng/mL   2024 0.60    2024 0.713    5/15/2025 0.542      The patient states that he generally feels well.  He denies any significant urinary symptoms including hematuria, dysuria, or urine incontinence.  He denies any rectal bleeding recently or diarrhea.  Colonoscopy due this year and he is planning to schedule.  He denies any bone pain.  He is compliant with his urology follow-up.     [facility]     Oncology History   Cancer Staging   Prostate cancer ([facility])  Staging form: Prostate, AJCC 8th Edition  - Clinical: Stage IIB (cT1c, cN0, cM0, PSA: 4.2, Grade Group: 2) - Signed by Meggan Renner MD on 2019  Prostate specific antigen (PSA) range: Less than 10  Faizan score: 7  Histologic grading system: 5 grade system  Oncology History   Prostate cancer ([facility])   2019 Initial Diagnosis    Prostate cancer ([facility])     2019 Biopsy    Final Diagnosis  A. Prostate, right lateral base, core needle biopsy: Benign prostate glands. No malignancy is identified.  B. Prostate, right medial base, core needle biopsy: Benign prostate glands. No malignancy is identified.  C. Prostate, right lateral mid, core needle biopsy: Focal high-grade PIN. No prostatic adenocarcinoma is identified.  D. Prostate, right medial mid, core needle biopsy:Benign prostate glands. No malignancy is identified.  E. Prostate, right lateral apex, core needle biopsy: Benign prostate glands. No malignancy is identified.  F. Prostate, right medial apex, core needle biopsy: Benign prostate glands. No malignancy is identified.     G. Prostate, left lateral base, core needle biopsy:             - Prostatic adenocarcinoma, [place] score 3 + 4 = 7, Prognostic Grade Group II, discontinuously involving 60% of one core biopsy. Approximately 10% Faizan pattern 4.                        H. Prostate, left medial, core needle biopsy: Prostatic adenocarcinoma, [place] score 3 + 3 = 6,  Prognostic Grade Group I, involving less than  5% of one disrupted core biopsy.               I. Prostate, left lateral mid, core needle biopsy:             - Small focus of atypical prostate glands, suspicious for prostatic adenocarcinoma, involving less than 5% of one core biopsy.     J. Prostate, left medial mid, core needle biopsy:             - Prostatic adenocarcinoma, Faizan score 3 + 4 = 7, Prognostic Grade Group II, discontinuously involving 80% of one core biopsy. Approximately 20% [employer] pattern 4.              K. Prostate, left lateral apex, core needle biopsy:             - Prostatic adenocarcinoma, Faizan score 3 + 3 = 6, Prognostic Grade Group I, involving 5% of one core biopsy.               L. Prostate, left medial apex, core needle biopsy:             - Prostatic adenocarcinoma, Faizan 3 + 3 = 6, Prognostic Grade Group I, involving less than 5% of one core biopsy.          9/23/2019 -  Cancer Staged    Staging form: Prostate, AJCC 8th Edition  - Clinical: Stage IIB (cT1c, cN0, cM0, PSA: 4.2, Grade Group: 2) - Signed by Meggan Renner MD on 9/23/2019  Prostate specific antigen (PSA) range: Less than 10  Histologic grading system: 5 grade system  Faizan score: 7       10/11/2019 -  Hormone Therapy    Firmagon on 10/11/19 and Lupron on 11/11/19 12/9/2019 - 2/11/2020 Radiation    7920cGy in 44 daily 180cGy fractions tot he prostate and proximal seminal vesicles        Review of Systems Refer to nursing note.    Medications Ordered Prior to Encounter[1]   Social History[2]      Objective   /78   Pulse 77   Temp (!) 97.3 °F (36.3 °C)   Resp 16   Wt 103 kg (227 lb)   SpO2 98%   BMI 31.66 kg/m²     Pain Screening:  Pain Score: 0-No pain  ECOG  0  Physical Exam  Vitals and nursing note reviewed.   Constitutional:       General: He is not in acute distress.     Appearance: He is well-developed.     Cardiovascular:      Rate and Rhythm: Normal rate.   Pulmonary:      Breath sounds: No  "wheezing, rhonchi or rales.   Abdominal:      Palpations: Abdomen is soft.      Tenderness: There is no right CVA tenderness or left CVA tenderness.     Musculoskeletal:      Right lower leg: No edema.      Left lower leg: No edema.      Comments: No spinal tenderness to percussion.   Lymphadenopathy:      Cervical: No cervical adenopathy.      Upper Body:      Right upper body: No supraclavicular adenopathy.      Left upper body: No supraclavicular adenopathy.     Neurological:      Mental Status: He is alert and oriented to person, place, and time.      Gait: Gait normal.            Administrative Statements   I have spent a total time of 23 minutes in caring for this patient on the day of the visit/encounter .  Portions of the record may have been created with voice recognition software.  Occasional wrong word or \"sound a like\" substitutions may have occurred due to the inherent limitations of voice recognition software.  Read the chart carefully and recognize, using context, where substitutions have occurred.       [1]   Current Outpatient Medications on File Prior to Visit   Medication Sig Dispense Refill    Acetaminophen 325 MG CAPS Take by mouth as needed      ASPIRIN 81 PO Take by mouth      Multiple Vitamin (MULTIVITAMINS PO) Take by mouth in the morning.      prednisoLONE acetate (PRED FORTE) 1 % ophthalmic suspension INSTILL 1 DROP IN OPERATIVE EYE FOUR TIMES A DAY FOR FOUR DAYS      Tafluprost, PF, 0.0015 % SOLN Administer 1 drop to both eyes daily at bedtime      XELPROS 0.005 % EMUL INSTILL 1 DROP DAILY IN EACH EYE AS DIRECTED  3     No current facility-administered medications on file prior to visit.   [2]   Social History  Tobacco Use    Smoking status: Former     Current packs/day: 0.00     Average packs/day: 0.3 packs/day for 6.0 years (1.5 ttl pk-yrs)     Types: Cigarettes     Start date: 1972     Quit date: 1978     Years since quittin.5    Smokeless tobacco: Never   Vaping Use    " Vaping status: Never Used   Substance and Sexual Activity    Alcohol use: Yes     Comment: socially- rarely    Drug use: No    Sexual activity: Yes     Partners: Female

## 2025-07-01 NOTE — PROGRESS NOTES
Javier Alatorre 1950 is a 74 y.o. male  with a history of clinical stage T1c prostatic adenocarcinoma, Faizan score 7 (3+4) with pretreatment PSA of 4.2ng/mL.  He completed a course of definitive radiation with concurrent 6 months of ADT on 20.  He was last seen 24 and returns today for follow-up evaluation.        25 [facility]rology, [place]  [place] 7 (3+4) prostate cancer   - T1c, pretreatment PSA of 4.2   - S/p definitive radiation with concurrent 6 months of ADT on 20.   - PSA from 5/15/25 stable at 0.542  - Follow up in 1 year with PSA         PSA   Latest Ref Rng 0.000 - 4.000 ng/mL   2024 0.60    2024 0.713    5/15/2025 0.542          [facility]    Follow up visit     Oncology History   Prostate cancer (HCC)   2019 Initial Diagnosis    Prostate cancer (HCC)     2019 Biopsy    Final Diagnosis  A. Prostate, right lateral base, core needle biopsy: Benign prostate glands. No malignancy is identified.  B. Prostate, right medial base, core needle biopsy: Benign prostate glands. No malignancy is identified.  C. Prostate, right lateral mid, core needle biopsy: Focal high-grade PIN. No prostatic adenocarcinoma is identified.  D. Prostate, right medial mid, core needle biopsy:Benign prostate glands. No malignancy is identified.  E. Prostate, right lateral apex, core needle biopsy: Benign prostate glands. No malignancy is identified.  F. Prostate, right medial apex, core needle biopsy: Benign prostate glands. No malignancy is identified.     G. Prostate, left lateral base, core needle biopsy:             - Prostatic adenocarcinoma, Indian score 3 + 4 = 7, Prognostic Grade Group II, discontinuously involving 60% of one core biopsy. Approximately 10% Indian pattern 4.                        H. Prostate, left medial, core needle biopsy: Prostatic adenocarcinoma, Indian score 3 + 3 = 6, Prognostic Grade Group I, involving less than  5% of one disrupted core biopsy.                I. Prostate, left lateral mid, core needle biopsy:             - Small focus of atypical prostate glands, suspicious for prostatic adenocarcinoma, involving less than 5% of one core biopsy.     J. Prostate, left medial mid, core needle biopsy:             - Prostatic adenocarcinoma, Faizan score 3 + 4 = 7, Prognostic Grade Group II, discontinuously involving 80% of one core biopsy. Approximately 20% [place] pattern 4.              K. Prostate, left lateral apex, core needle biopsy:             - Prostatic adenocarcinoma, Faizan score 3 + 3 = 6, Prognostic Grade Group I, involving 5% of one core biopsy.               L. Prostate, left medial apex, core needle biopsy:             - Prostatic adenocarcinoma, [place] 3 + 3 = 6, Prognostic Grade Group I, involving less than 5% of one core biopsy.          9/23/2019 -  Cancer Staged    Staging form: Prostate, AJCC 8th Edition  - Clinical: Stage IIB (cT1c, cN0, cM0, PSA: 4.2, Grade Group: 2) - Signed by Meggan Renner MD on 9/23/2019  Prostate specific antigen (PSA) range: Less than 10  Histologic grading system: 5 grade system  [place] score: 7       10/11/2019 -  Hormone Therapy    Firmagon on 10/11/19 and Lupron on 11/11/19 12/9/2019 - 2/11/2020 Radiation    7920cGy in 44 daily 180cGy fractions tot he prostate and proximal seminal vesicles         Review of Systems:  Review of Systems    Clinical Trial: no    IPSS Questionnaire (AUA-7):  Over the past month…    1)  How often have you had a sensation of not emptying your bladder completely after you finish urinating?  0 - Not at all   2)  How often have you had to urinate again less than two hours after you finished urinating? 0 - Not at all   3)  How often have you found you stopped and started again several times when you urinated?  0 - Not at all   4) How difficult have you found it to postpone urination?  0 - Not at all   5) How often have you had a weak urinary stream?  0 - Not at all   6) How often have  you had to push or strain to begin urination?  0 - Not at all   7) How many times did you most typically get up to urinate from the time you went to bed until the time you got up in the morning?  2 - 2 times   Total Score:  2           Health Maintenance   Topic Date Due    BMI: Followup Plan  2024    COVID-19 Vaccine ( season) 2025    Fall Risk  2025    Annual Physical  2025    Depression Screening  2025    Colorectal Cancer Screening  2025    Influenza Vaccine (1) 2025    BMI: Adult  2026    DTaP,Tdap,and Td Vaccines (3 - Td or Tdap) 10/07/2034    Hepatitis C Screening  Completed    RSV Vaccine for Pregnant Patients and Patients Age 60+ Years  Completed    Zoster Vaccine  Completed    Pneumococcal Vaccine: 50+ Years  Completed    Meningococcal B Vaccine  Aged Out    RSV Vaccine age 0-20 Months  Aged Out    HIB Vaccine  Aged Out    IPV Vaccine  Aged Out    Hepatitis A Vaccine  Aged Out    Meningococcal ACWY Vaccine  Aged Out    HPV Vaccine  Aged Out     Problem List[1]  Past Medical History[2]  Past Surgical History[3]  Family History[4]  Social History     Socioeconomic History    Marital status: /Civil Union     Spouse name: Not on file    Number of children: Not on file    Years of education: Not on file    Highest education level: Not on file   Occupational History    Not on file   Tobacco Use    Smoking status: Former     Current packs/day: 0.00     Average packs/day: 0.3 packs/day for 6.0 years (1.5 ttl pk-yrs)     Types: Cigarettes     Start date: 1972     Quit date: 1978     Years since quittin.5    Smokeless tobacco: Never   Vaping Use    Vaping status: Never Used   Substance and Sexual Activity    Alcohol use: Yes     Comment: socially    Drug use: No    Sexual activity: Yes     Partners: Female   Other Topics Concern    Not on file   Social History Narrative    Not on file     Social Drivers of Health     Financial Resource  Strain: Not on file   Food Insecurity: No Food Insecurity (4/29/2024)    Nursing - Inadequate Food Risk Classification     Worried About Running Out of Food in the Last Year: Never true     Ran Out of Food in the Last Year: Never true     Ran Out of Food in the Last Year: Not on file   Transportation Needs: No Transportation Needs (4/29/2024)    PRAPARE - Transportation     Lack of Transportation (Medical): No     Lack of Transportation (Non-Medical): No   Physical Activity: Insufficiently Active (5/20/2019)    Exercise Vital Sign     Days of Exercise per Week: 7 days     Minutes of Exercise per Session: 10 min   Stress: No Stress Concern Present (5/20/2019)    [employer] - Occupational Stress Questionnaire     Feeling of Stress : Not at all   Social Connections: Not on file   Intimate Partner Violence: Not on file   Housing Stability: Low Risk  (4/29/2024)    Housing Stability Vital Sign     Unable to Pay for Housing in the Last Year: No     Number of Times Moved in the Last Year: 1     Homeless in the Last Year: No     Current Medications[5]  No Known Allergies  There were no vitals filed for this visit.             [1]   Patient Active Problem List  Diagnosis    Mixed hyperlipidemia    Prediabetes    Prostate cancer (HCC)   [2]   Past Medical History:  Diagnosis Date    Benign neoplasm of colon     Cancer (HCC)     Prostate CA    Colon polyp     Colon, diverticulosis     ED (erectile dysfunction) of organic origin     Former tobacco use     Mixed hyperlipidemia     Obesity     Prediabetes     Prostate cancer (HCC)    [3]   Past Surgical History:  Procedure Laterality Date    COLONOSCOPY  10/30/2014    COLONOSCOPY  2004    COLONOSCOPY  2006    PROSTATE BIOPSY      RADIOACTIVE PLAQUE INSERTION N/A 11/19/2019    Procedure: TISSUE MARKER INSERTION, SPACEOAR INSERTION;  Surgeon: Armando Matos MD;  Location: [facility];  Service: Urology    VENTRAL HERNIA REPAIR     [4]   Family  History  Problem Relation Name Age of Onset    Breast cancer Mother Waleska     Heart disease Father Javier Alatorre Sr.         Cardiac pacemaker    Cancer Sister Soumya     Stroke Brother Samuel    [5]   Current Outpatient Medications:     Acetaminophen 325 MG CAPS, Take by mouth as needed, Disp: , Rfl:     ASPIRIN 81 PO, Take by mouth, Disp: , Rfl:     Multiple Vitamin (MULTIVITAMINS PO), Take by mouth in the morning., Disp: , Rfl:     prednisoLONE acetate (PRED FORTE) 1 % ophthalmic suspension, INSTILL 1 DROP IN OPERATIVE EYE FOUR TIMES A DAY FOR FOUR DAYS, Disp: , Rfl:     Tafluprost, PF, 0.0015 % SOLN, Administer 1 drop to both eyes daily at bedtime, Disp: , Rfl:     XELPROS 0.005 % EMUL, INSTILL 1 DROP DAILY IN EACH EYE AS DIRECTED, Disp: , Rfl: 3

## 2025-07-01 NOTE — ASSESSMENT & PLAN NOTE
Javier Alatorre 1950 is a 74 y.o. male  with a history of clinical stage T1c prostatic adenocarcinoma, Faizan score 7 (3+4) with pretreatment PSA of 4.2ng/mL.  He completed a course of definitive radiation with concurrent 6 months of ADT on 2/11/20.      Clinically MARITA more than 5 years post radiation.  - PSA from 5/15/2025 remains stable at 0.54 ng/mL.    - He continues to follow annually with PSA check the urology clinic, which is appropriate  - He has no new or concerning progression of  or GI symptoms.  - Discharge patient to follow-up as needed and continue annual surveillance with urology clinic.  We reviewed that biochemical failure for the patient whose elkin was 0.3 ng/mL would be a PSA of 2.3 ng/mL.  If the patient should have rising PSA approaching or exceeding this level, he should be referred back for active evaluation.    Otherwise we will see the patient as needed.  He is given the chance to ask questions were answered to his satisfaction.

## 2025-07-08 ENCOUNTER — APPOINTMENT (OUTPATIENT)
Age: 75
End: 2025-07-08
Attending: INTERNAL MEDICINE
Payer: COMMERCIAL

## 2025-07-08 ENCOUNTER — OFFICE VISIT (OUTPATIENT)
Age: 75
End: 2025-07-08
Payer: COMMERCIAL

## 2025-07-08 VITALS
BODY MASS INDEX: 32.06 KG/M2 | HEART RATE: 66 BPM | DIASTOLIC BLOOD PRESSURE: 80 MMHG | SYSTOLIC BLOOD PRESSURE: 128 MMHG | TEMPERATURE: 96.9 F | WEIGHT: 229 LBS | HEIGHT: 71 IN | OXYGEN SATURATION: 97 % | RESPIRATION RATE: 18 BRPM

## 2025-07-08 DIAGNOSIS — E78.2 MIXED HYPERLIPIDEMIA: Chronic | ICD-10-CM

## 2025-07-08 DIAGNOSIS — N18.31 STAGE 3A CHRONIC KIDNEY DISEASE (HCC): ICD-10-CM

## 2025-07-08 DIAGNOSIS — R73.03 PREDIABETES: Chronic | ICD-10-CM

## 2025-07-08 DIAGNOSIS — Z00.00 ANNUAL PHYSICAL EXAM: Primary | ICD-10-CM

## 2025-07-08 LAB
25(OH)D3 SERPL-MCNC: 30.1 NG/ML (ref 30–100)
ALBUMIN SERPL BCG-MCNC: 4.5 G/DL (ref 3.5–5)
ALP SERPL-CCNC: 45 U/L (ref 34–104)
ALT SERPL W P-5'-P-CCNC: 20 U/L (ref 7–52)
ANION GAP SERPL CALCULATED.3IONS-SCNC: 6 MMOL/L (ref 4–13)
AST SERPL W P-5'-P-CCNC: 21 U/L (ref 13–39)
BILIRUB SERPL-MCNC: 0.45 MG/DL (ref 0.2–1)
BUN SERPL-MCNC: 16 MG/DL (ref 5–25)
CALCIUM SERPL-MCNC: 9.1 MG/DL (ref 8.4–10.2)
CHLORIDE SERPL-SCNC: 107 MMOL/L (ref 96–108)
CHOLEST SERPL-MCNC: 155 MG/DL (ref ?–200)
CO2 SERPL-SCNC: 28 MMOL/L (ref 21–32)
CREAT SERPL-MCNC: 1.09 MG/DL (ref 0.6–1.3)
CREAT UR-MCNC: 168.7 MG/DL
ERYTHROCYTE [DISTWIDTH] IN BLOOD BY AUTOMATED COUNT: 14.6 % (ref 11.6–15.1)
EST. AVERAGE GLUCOSE BLD GHB EST-MCNC: 120 MG/DL
GFR SERPL CREATININE-BSD FRML MDRD: 66 ML/MIN/1.73SQ M
GLUCOSE P FAST SERPL-MCNC: 98 MG/DL (ref 65–99)
HBA1C MFR BLD: 5.8 %
HCT VFR BLD AUTO: 47.1 % (ref 36.5–49.3)
HDLC SERPL-MCNC: 60 MG/DL
HGB BLD-MCNC: 15.4 G/DL (ref 12–17)
LDLC SERPL CALC-MCNC: 82 MG/DL (ref 0–100)
MCH RBC QN AUTO: 27.5 PG (ref 26.8–34.3)
MCHC RBC AUTO-ENTMCNC: 32.7 G/DL (ref 31.4–37.4)
MCV RBC AUTO: 84 FL (ref 82–98)
MICROALBUMIN UR-MCNC: 9.9 MG/L
MICROALBUMIN/CREAT 24H UR: 6 MG/G CREATININE (ref 0–30)
PLATELET # BLD AUTO: 276 THOUSANDS/UL (ref 149–390)
PMV BLD AUTO: 9.7 FL (ref 8.9–12.7)
POTASSIUM SERPL-SCNC: 4.2 MMOL/L (ref 3.5–5.3)
PROT SERPL-MCNC: 7.4 G/DL (ref 6.4–8.4)
RBC # BLD AUTO: 5.6 MILLION/UL (ref 3.88–5.62)
SODIUM SERPL-SCNC: 141 MMOL/L (ref 135–147)
TRIGL SERPL-MCNC: 66 MG/DL (ref ?–150)
WBC # BLD AUTO: 4.52 THOUSAND/UL (ref 4.31–10.16)

## 2025-07-08 PROCEDURE — 83036 HEMOGLOBIN GLYCOSYLATED A1C: CPT

## 2025-07-08 PROCEDURE — 82043 UR ALBUMIN QUANTITATIVE: CPT

## 2025-07-08 PROCEDURE — 82306 VITAMIN D 25 HYDROXY: CPT

## 2025-07-08 PROCEDURE — 85027 COMPLETE CBC AUTOMATED: CPT

## 2025-07-08 PROCEDURE — 80061 LIPID PANEL: CPT

## 2025-07-08 PROCEDURE — 36415 COLL VENOUS BLD VENIPUNCTURE: CPT

## 2025-07-08 PROCEDURE — 82570 ASSAY OF URINE CREATININE: CPT

## 2025-07-08 PROCEDURE — 99397 PER PM REEVAL EST PAT 65+ YR: CPT | Performed by: INTERNAL MEDICINE

## 2025-07-08 PROCEDURE — 80053 COMPREHEN METABOLIC PANEL: CPT

## 2025-07-08 NOTE — ASSESSMENT & PLAN NOTE
Most recent A1c was 6.0 % on 4/29/2024. Check updated labs. Watch carbohydrate intake. Increase exercise.    Orders:  •  Hemoglobin A1C; Future

## 2025-07-08 NOTE — ASSESSMENT & PLAN NOTE
Will check lipid panel. Heart healthy diet encouraged. No history of ASCVD.    Orders:  •  Comprehensive metabolic panel; Future  •  CBC; Future  •  Lipid Panel with Direct LDL reflex; Future

## 2025-07-08 NOTE — PATIENT INSTRUCTIONS
"Patient Education     Routine physical for adults   The Basics   Written by the doctors and editors at [facility]   What is a physical? -- A physical is a routine visit, or \"check-up,\" with your doctor. You might also hear it called a \"wellness visit\" or \"preventive visit.\"  During each visit, the doctor will:   Ask about your physical and mental health   Ask about your habits, behaviors, and lifestyle   Do an exam   Give you vaccines if needed   Talk to you about any medicines you take   Give advice about your health   Answer your questions  Getting regular check-ups is an important part of taking care of your health. It can help your doctor find and treat any problems you have. But it's also important for preventing health problems.  A routine physical is different from a \"sick visit.\" A sick visit is when you see a doctor because of a health concern or problem. Since physicals are scheduled ahead of time, you can think about what you want to ask the doctor.  How often should I get a physical? -- It depends on your age and health. In general, for people age 21 years and older:   If you are younger than 50 years, you might be able to get a physical every 3 years.   If you are 50 years or older, your doctor might recommend a physical every year.  If you have an ongoing health condition, like diabetes or high blood pressure, your doctor will probably want to see you more often.  What happens during a physical? -- In general, each visit will include:   Physical exam - The doctor or nurse will check your height, weight, heart rate, and blood pressure. They will also look at your eyes and ears. They will ask about how you are feeling and whether you have any symptoms that bother you.   Medicines - It's a good idea to bring a list of all the medicines you take to each doctor visit. Your doctor will talk to you about your medicines and answer any questions. Tell them if you are having any side effects that bother you. You " "should also tell them if you are having trouble paying for any of your medicines.   Habits and behaviors - This includes:   Your diet   Your exercise habits   Whether you smoke, drink alcohol, or use drugs   Whether you are sexually active   Whether you feel safe at home  Your doctor will talk to you about things you can do to improve your health and lower your risk of health problems. They will also offer help and support. For example, if you want to quit smoking, they can give you advice and might prescribe medicines. If you want to improve your diet or get more physical activity, they can help you with this, too.   Lab tests, if needed - The tests you get will depend on your age and situation. For example, your doctor might want to check your:   Cholesterol   Blood sugar   Iron level   Vaccines - The recommended vaccines will depend on your age, health, and what vaccines you already had. Vaccines are very important because they can prevent certain serious or deadly infections.   Discussion of screening - \"Screening\" means checking for diseases or other health problems before they cause symptoms. Your doctor can recommend screening based on your age, risk, and preferences. This might include tests to check for:   Cancer, such as breast, prostate, cervical, ovarian, colorectal, prostate, lung, or skin cancer   Sexually transmitted infections, such as chlamydia and gonorrhea   Mental health conditions like depression and anxiety  Your doctor will talk to you about the different types of screening tests. They can help you decide which screenings to have. They can also explain what the results might mean.   Answering questions - The physical is a good time to ask the doctor or nurse questions about your health. If needed, they can refer you to other doctors or specialists, too.  Adults older than 65 years often need other care, too. As you get older, your doctor will talk to you about:   How to prevent falling at " home   Hearing or vision tests   Memory testing   How to take your medicines safely   Making sure that you have the help and support you need at home  All topics are updated as new evidence becomes available and our peer review process is complete.  This topic retrieved from [employer] on: May 02, 2024.  Topic 132717 Version 1.0  Release: 32.4.3 - C32.122  © 2024 UpToDate, Inc. and/or its affiliates. All rights reserved.  Consumer Information Use and Disclaimer   Disclaimer: This generalized information is a limited summary of diagnosis, treatment, and/or medication information. It is not meant to be comprehensive and should be used as a tool to help the user understand and/or assess potential diagnostic and treatment options. It does NOT include all information about conditions, treatments, medications, side effects, or risks that may apply to a specific patient. It is not intended to be medical advice or a substitute for the medical advice, diagnosis, or treatment of a health care provider based on the health care provider's examination and assessment of a patient's specific and unique circumstances. Patients must speak with a health care provider for complete information about their health, medical questions, and treatment options, including any risks or benefits regarding use of medications. This information does not endorse any treatments or medications as safe, effective, or approved for treating a specific patient. UpToDate, Inc. and its affiliates disclaim any warranty or liability relating to this information or the use thereof.The use of this information is governed by the Terms of Use, available at https://www.[employer] Prodruguwer.com/en/know/clinical-effectiveness-terms. 2024© UpToDate, Inc. and its affiliates and/or licensors. All rights reserved.  Copyright   © 2024 UpToDate, Inc. and/or its affiliates. All rights reserved.

## 2025-07-08 NOTE — PROGRESS NOTES
Adult Annual Physical  Name: Javier Alatorre      : 1950      MRN: [id number]  Encounter Provider: Sae Navarro DO  Encounter Date: 2025   Encounter department: [facility] [place]    :  Assessment & Plan  Annual physical exam         Prediabetes    Most recent A1c was 6.0 % on 2024. Check updated labs. Watch carbohydrate intake. Increase exercise.    Orders:  •  Hemoglobin A1C; Future    Mixed hyperlipidemia    Will check lipid panel. Heart healthy diet encouraged. No history of ASCVD.    Orders:  •  Comprehensive metabolic panel; Future  •  CBC; Future  •  Lipid Panel with Direct LDL reflex; Future    Stage 3a chronic kidney disease (HCC)  Component      Latest Ref Rng 2022   GFR, Calculated      ml/min/1.73sq m 55  56  55      Stay well hydrated and avoid nephrotoxins. Will check updated labs.    Orders:  •  Vitamin D 25 hydroxy; Future  •  Albumin / creatinine urine ratio; Future      Preventive Screenings:  - Diabetes Screening: orders placed  - Cholesterol Screening: has hyperlipidemia and orders placed   - Hepatitis C screening: screening up-to-date   - Colon cancer screening: screening up-to-date   - Lung cancer screening: screening not indicated   - Prostate cancer screening: has history of prostate cancer   - AAA screening: screening up-to-date     Counseling/Anticipatory Guidance:  - Alcohol: discussed moderation in alcohol intake and recommendations for healthy alcohol use.   - Drug use: discussed harms of illicit drug use and how it can negatively impact mental/physical health.   - Tobacco use: discussed harms of tobacco use and management options for quitting.   - Dental health: discussed importance of regular tooth brushing, flossing, and dental visits.   - Sexual health: discussed sexually transmitted diseases, partner selection, use of condoms, avoidance of unintended pregnancy, and contraceptive alternatives.   - Diet: discussed  recommendations for a healthy/well-balanced diet.   - Exercise: the importance of regular exercise/physical activity was discussed. Recommend exercise 3-5 times per week for at least 30 minutes.   - Injury prevention: discussed safety/seat belts, safety helmets, smoke detectors, carbon monoxide detectors, and smoking near bedding or upholstery.       Depression Screening and Follow-up Plan: Patient was screened for depression during today's encounter. They screened negative with a PHQ-2 score of 0.      History of Present Illness     Adult Annual Physical:  Patient presents for annual physical. Javier has a history of clinical stage T1c prostatic adenocarcinoma treated with radiation and ADT, completed in February 2020. As of July 1, 2025, he remained clinically MARITA, with stable PSA levels at 0.54 ng/mL. Other history includes pre-diabetes and hyperlipidemia. No recent hospitalizations or ED visits.     Diet and Physical Activity:  - Diet/Nutrition: no special diet.  - Exercise: no formal exercise.    Depression Screening:  - PHQ-2 Score: 0    General Health:  - Sleep: 4-6 hours of sleep on average.  - Hearing: normal hearing right ear and normal hearing left ear.  - Vision: wears glasses.  - Dental: regular dental visits.    /GYN Health:  - Follows with GYN: no.   - History of STDs: no     Health:  - History of STDs: no.   - Urinary symptoms: none.     Advanced Care Planning:  - Has an advanced directive?: no    - Has a durable medical POA?: no      Review of Systems   Constitutional:  Negative for activity change, appetite change and fatigue.   Respiratory:  Negative for apnea, cough, chest tightness, shortness of breath and wheezing.    Cardiovascular:  Negative for chest pain, palpitations and leg swelling.   Gastrointestinal:  Negative for abdominal distention, abdominal pain, blood in stool, constipation, diarrhea, nausea and vomiting.   Musculoskeletal:  Negative for arthralgias, back pain, gait problem,  "joint swelling and myalgias.   Skin:  Negative for rash and wound.   Neurological:  Negative for dizziness, weakness, light-headedness, numbness and headaches.   Psychiatric/Behavioral:  Negative for behavioral problems, confusion, hallucinations, sleep disturbance and suicidal ideas. The patient is not nervous/anxious.      Medical History Reviewed by provider this encounter:  Tobacco  Allergies  Meds  Problems  Med Hx  Surg Hx  Fam Hx         Objective   /80 (BP Location: Left arm, Patient Position: Sitting, Cuff Size: Large)   Pulse 66   Temp (!) 96.9 °F (36.1 °C) (Tympanic)   Resp 18   Ht 5' 11\" (1.803 m)   Wt 104 kg (229 lb)   SpO2 97%   BMI 31.94 kg/m²     Physical Exam  Vitals reviewed.   Constitutional:       General: He is not in acute distress.     Appearance: He is well-developed. He is obese. He is not diaphoretic.     Eyes:      General: No scleral icterus.        Right eye: No discharge.         Left eye: No discharge.      Conjunctiva/sclera: Conjunctivae normal.     Neck:      Thyroid: No thyromegaly.      Vascular: No JVD.     Cardiovascular:      Rate and Rhythm: Normal rate and regular rhythm.      Heart sounds: Normal heart sounds. No murmur heard.  Pulmonary:      Effort: Pulmonary effort is normal. No respiratory distress.      Breath sounds: Normal breath sounds. No wheezing or rales.   Abdominal:      General: Bowel sounds are normal. There is no distension.      Palpations: Abdomen is soft.      Tenderness: There is no abdominal tenderness.     Musculoskeletal:      Cervical back: Neck supple.      Right lower leg: No edema.      Left lower leg: No edema.   Lymphadenopathy:      Cervical: No cervical adenopathy.     Skin:     General: Skin is warm and dry.     Neurological:      Mental Status: He is alert.     Psychiatric:         Mood and Affect: Mood normal.         Behavior: Behavior normal.       Sae Navarro, DO   "

## 2025-07-09 ENCOUNTER — RESULTS FOLLOW-UP (OUTPATIENT)
Age: 75
End: 2025-07-09

## 2025-07-09 NOTE — TELEPHONE ENCOUNTER
----- Message from Sae Navarro DO sent at 7/9/2025  8:55 AM EDT -----  Labs look great. A1c has improved down to 5.8%. Liver function is normal. Kidney function is slightly improved from 1 year ago.  ----- Message -----  From: Lab, Background User  Sent: 7/8/2025   4:14 PM EDT  To: Sae Navarro DO

## 2025-07-15 ENCOUNTER — TELEPHONE (OUTPATIENT)
Age: 75
End: 2025-07-15

## 2025-07-15 ENCOUNTER — PREP FOR PROCEDURE (OUTPATIENT)
Age: 75
End: 2025-07-15

## 2025-07-15 DIAGNOSIS — Z86.0100 HISTORY OF COLON POLYPS: Primary | ICD-10-CM

## 2025-07-31 ENCOUNTER — ANESTHESIA EVENT (OUTPATIENT)
Dept: GASTROENTEROLOGY | Facility: HOSPITAL | Age: 75
End: 2025-07-31
Payer: COMMERCIAL

## 2025-07-31 ENCOUNTER — ANESTHESIA (OUTPATIENT)
Dept: GASTROENTEROLOGY | Facility: HOSPITAL | Age: 75
End: 2025-07-31
Payer: COMMERCIAL

## 2025-07-31 ENCOUNTER — HOSPITAL ENCOUNTER (OUTPATIENT)
Dept: GASTROENTEROLOGY | Facility: HOSPITAL | Age: 75
Setting detail: OUTPATIENT SURGERY
Discharge: HOME | End: 2025-07-31
Attending: INTERNAL MEDICINE
Payer: COMMERCIAL

## 2025-07-31 VITALS
OXYGEN SATURATION: 99 % | HEART RATE: 64 BPM | HEIGHT: 72 IN | RESPIRATION RATE: 20 BRPM | DIASTOLIC BLOOD PRESSURE: 84 MMHG | SYSTOLIC BLOOD PRESSURE: 123 MMHG | BODY MASS INDEX: 30.25 KG/M2 | TEMPERATURE: 98 F | WEIGHT: 223.33 LBS

## 2025-07-31 DIAGNOSIS — Z86.0100 HISTORY OF COLON POLYPS: ICD-10-CM

## 2025-07-31 PROCEDURE — 88305 TISSUE EXAM BY PATHOLOGIST: CPT | Performed by: PATHOLOGY

## 2025-07-31 PROCEDURE — 45385 COLONOSCOPY W/LESION REMOVAL: CPT | Performed by: INTERNAL MEDICINE

## 2025-07-31 RX ORDER — LIDOCAINE HYDROCHLORIDE 10 MG/ML
INJECTION, SOLUTION EPIDURAL; INFILTRATION; INTRACAUDAL; PERINEURAL AS NEEDED
Status: DISCONTINUED | OUTPATIENT
Start: 2025-07-31 | End: 2025-07-31

## 2025-07-31 RX ORDER — SODIUM CHLORIDE, SODIUM LACTATE, POTASSIUM CHLORIDE, CALCIUM CHLORIDE 600; 310; 30; 20 MG/100ML; MG/100ML; MG/100ML; MG/100ML
INJECTION, SOLUTION INTRAVENOUS CONTINUOUS PRN
Status: DISCONTINUED | OUTPATIENT
Start: 2025-07-31 | End: 2025-07-31

## 2025-07-31 RX ORDER — PROPOFOL 10 MG/ML
INJECTION, EMULSION INTRAVENOUS AS NEEDED
Status: DISCONTINUED | OUTPATIENT
Start: 2025-07-31 | End: 2025-07-31

## 2025-07-31 RX ADMIN — PROPOFOL 50 MG: 10 INJECTION, EMULSION INTRAVENOUS at 10:20

## 2025-07-31 RX ADMIN — PROPOFOL 20 MG: 10 INJECTION, EMULSION INTRAVENOUS at 10:25

## 2025-07-31 RX ADMIN — SODIUM CHLORIDE, SODIUM LACTATE, POTASSIUM CHLORIDE, AND CALCIUM CHLORIDE: .6; .31; .03; .02 INJECTION, SOLUTION INTRAVENOUS at 10:14

## 2025-07-31 RX ADMIN — LIDOCAINE HYDROCHLORIDE 50 MG: 10 INJECTION, SOLUTION EPIDURAL; INFILTRATION; INTRACAUDAL at 10:16

## 2025-07-31 RX ADMIN — PROPOFOL 100 MG: 10 INJECTION, EMULSION INTRAVENOUS at 10:16

## 2025-08-04 PROCEDURE — 88305 TISSUE EXAM BY PATHOLOGIST: CPT | Performed by: PATHOLOGY

## (undated) DEVICE — 60 ML SYRINGE,TOOMEY TYPE: Brand: MONOJECT

## (undated) DEVICE — NEEDLE BLUNT 18 G X 1 1/2 W FILTER

## (undated) DEVICE — NEEDLE 25G X 1 1/2

## (undated) DEVICE — LUBRICANT SURGILUBE TUBE 4 OZ  FLIP TOP

## (undated) DEVICE — CHLORAPREP HI-LITE 26ML ORANGE

## (undated) DEVICE — 3M™ IOBAN™ 2 ANTIMICROBIAL INCISE DRAPE 6650EZ: Brand: IOBAN™ 2

## (undated) DEVICE — 3M™ IOBAN™ 2 ANTIMICROBIAL INCISE DRAPE 6640EZ: Brand: IOBAN™ 2

## (undated) DEVICE — ULTRASOUND GEL STERILE FOIL PK

## (undated) DEVICE — SKIN MARKER DUAL TIP WITH RULER CAP, FLEXIBLE RULER AND LABELS: Brand: DEVON

## (undated) DEVICE — GLOVE SRG BIOGEL 8

## (undated) DEVICE — SPECIMEN CONTAINER STERILE PEEL PACK

## (undated) DEVICE — HEAVY DUTY TABLE COVER: Brand: CONVERTORS

## (undated) DEVICE — GAUZE SPONGES,16 PLY: Brand: CURITY

## (undated) DEVICE — THE SPACEOAR SYSTEM CONSISTS OF COMPONENTS FOR PREPARATION OF A SYNTHETIC, ABSORBABLE HYDROGEL SPACER AND A DELIVERY SYSTEM PACKAGED FOR SINGLE USE.: Brand: SPACEOAR SYSTEM

## (undated) DEVICE — SYRINGE 10ML LL

## (undated) DEVICE — PREP PAD BNS: Brand: CONVERTORS